# Patient Record
Sex: FEMALE | Race: WHITE | Employment: OTHER | URBAN - METROPOLITAN AREA
[De-identification: names, ages, dates, MRNs, and addresses within clinical notes are randomized per-mention and may not be internally consistent; named-entity substitution may affect disease eponyms.]

---

## 2017-08-02 ENCOUNTER — TELEPHONE (OUTPATIENT)
Dept: FAMILY MEDICINE CLINIC | Age: 67
End: 2017-08-02

## 2017-08-10 ENCOUNTER — OFFICE VISIT (OUTPATIENT)
Dept: FAMILY MEDICINE CLINIC | Age: 67
End: 2017-08-10

## 2017-08-10 VITALS
HEIGHT: 65 IN | DIASTOLIC BLOOD PRESSURE: 82 MMHG | SYSTOLIC BLOOD PRESSURE: 134 MMHG | HEART RATE: 82 BPM | OXYGEN SATURATION: 98 % | WEIGHT: 160 LBS | BODY MASS INDEX: 26.66 KG/M2

## 2017-08-10 DIAGNOSIS — Z00.00 MEDICARE WELCOME EXAM: Primary | ICD-10-CM

## 2017-08-10 DIAGNOSIS — Z78.0 MENOPAUSE: ICD-10-CM

## 2017-08-10 DIAGNOSIS — Z13.220 SCREENING, LIPID: ICD-10-CM

## 2017-08-10 DIAGNOSIS — Z23 NEED FOR DIPHTHERIA-TETANUS-PERTUSSIS (TDAP) VACCINE, ADULT/ADOLESCENT: ICD-10-CM

## 2017-08-10 DIAGNOSIS — R53.82 CHRONIC FATIGUE: ICD-10-CM

## 2017-08-10 DIAGNOSIS — Z23 NEED FOR PNEUMOCOCCAL VACCINE: ICD-10-CM

## 2017-08-10 PROCEDURE — 1036F TOBACCO NON-USER: CPT | Performed by: FAMILY MEDICINE

## 2017-08-10 PROCEDURE — G0009 ADMIN PNEUMOCOCCAL VACCINE: HCPCS | Performed by: FAMILY MEDICINE

## 2017-08-10 PROCEDURE — 4040F PNEUMOC VAC/ADMIN/RCVD: CPT | Performed by: FAMILY MEDICINE

## 2017-08-10 PROCEDURE — 99212 OFFICE O/P EST SF 10 MIN: CPT | Performed by: FAMILY MEDICINE

## 2017-08-10 PROCEDURE — G0402 INITIAL PREVENTIVE EXAM: HCPCS | Performed by: FAMILY MEDICINE

## 2017-08-10 PROCEDURE — G8419 CALC BMI OUT NRM PARAM NOF/U: HCPCS | Performed by: FAMILY MEDICINE

## 2017-08-10 PROCEDURE — G8427 DOCREV CUR MEDS BY ELIG CLIN: HCPCS | Performed by: FAMILY MEDICINE

## 2017-08-10 PROCEDURE — 1090F PRES/ABSN URINE INCON ASSESS: CPT | Performed by: FAMILY MEDICINE

## 2017-08-10 PROCEDURE — G8400 PT W/DXA NO RESULTS DOC: HCPCS | Performed by: FAMILY MEDICINE

## 2017-08-10 PROCEDURE — 1123F ACP DISCUSS/DSCN MKR DOCD: CPT | Performed by: FAMILY MEDICINE

## 2017-08-10 PROCEDURE — 3014F SCREEN MAMMO DOC REV: CPT | Performed by: FAMILY MEDICINE

## 2017-08-10 PROCEDURE — 90715 TDAP VACCINE 7 YRS/> IM: CPT | Performed by: FAMILY MEDICINE

## 2017-08-10 PROCEDURE — 90670 PCV13 VACCINE IM: CPT | Performed by: FAMILY MEDICINE

## 2017-08-10 PROCEDURE — 90471 IMMUNIZATION ADMIN: CPT | Performed by: FAMILY MEDICINE

## 2017-08-10 PROCEDURE — 3017F COLORECTAL CA SCREEN DOC REV: CPT | Performed by: FAMILY MEDICINE

## 2017-08-10 ASSESSMENT — LIFESTYLE VARIABLES: HOW OFTEN DO YOU HAVE A DRINK CONTAINING ALCOHOL: 0

## 2017-08-10 ASSESSMENT — PATIENT HEALTH QUESTIONNAIRE - PHQ9: SUM OF ALL RESPONSES TO PHQ QUESTIONS 1-9: 0

## 2017-08-10 ASSESSMENT — ANXIETY QUESTIONNAIRES: GAD7 TOTAL SCORE: 0

## 2017-08-17 ENCOUNTER — HOSPITAL ENCOUNTER (OUTPATIENT)
Dept: MAMMOGRAPHY | Age: 67
Discharge: HOME | End: 2017-08-17
Attending: FAMILY MEDICINE | Admitting: FAMILY MEDICINE

## 2017-08-17 ENCOUNTER — HOSPITAL ENCOUNTER (OUTPATIENT)
Dept: OTHER | Age: 67
Discharge: HOME | End: 2017-08-17
Attending: FAMILY MEDICINE | Admitting: FAMILY MEDICINE

## 2017-08-17 DIAGNOSIS — Z13.820 ENCOUNTER FOR IMAGING TO ASSESS OSTEOPOROSIS: ICD-10-CM

## 2017-08-17 DIAGNOSIS — Z78.0 ASYMPTOMATIC MENOPAUSAL STATE: ICD-10-CM

## 2017-08-17 LAB
A/G RATIO: 1.7 (ref 1.1–2.2)
ALBUMIN SERPL-MCNC: 4.5 G/DL (ref 3.4–5)
ALP BLD-CCNC: 84 U/L (ref 40–129)
ALT SERPL-CCNC: 29 U/L (ref 10–40)
ANION GAP SERPL CALCULATED.3IONS-SCNC: 17 MMOL/L (ref 3–16)
AST SERPL-CCNC: 26 U/L (ref 15–37)
BASOPHILS ABSOLUTE: 0 K/UL (ref 0–0.2)
BASOPHILS RELATIVE PERCENT: 0.4 %
BILIRUB SERPL-MCNC: 0.6 MG/DL (ref 0–1)
BUN BLDV-MCNC: 17 MG/DL (ref 7–20)
CALCIUM SERPL-MCNC: 9.7 MG/DL (ref 8.3–10.6)
CHLORIDE BLD-SCNC: 100 MMOL/L (ref 99–110)
CHOLESTEROL, FASTING: 203 MG/DL (ref 0–199)
CO2: 23 MMOL/L (ref 21–32)
CREAT SERPL-MCNC: 0.6 MG/DL (ref 0.6–1.2)
EOSINOPHILS ABSOLUTE: 0 K/UL (ref 0–0.6)
EOSINOPHILS RELATIVE PERCENT: 0.3 %
GFR AFRICAN AMERICAN: >60
GFR NON-AFRICAN AMERICAN: >60
GLOBULIN: 2.6 G/DL
GLUCOSE FASTING: 95 MG/DL (ref 70–99)
HCT VFR BLD CALC: 47.9 % (ref 36–48)
HDLC SERPL-MCNC: 57 MG/DL (ref 40–60)
HEMOGLOBIN: 16.1 G/DL (ref 12–16)
LDL CHOLESTEROL CALCULATED: 108 MG/DL
LYMPHOCYTES ABSOLUTE: 1.2 K/UL (ref 1–5.1)
LYMPHOCYTES RELATIVE PERCENT: 18 %
MCH RBC QN AUTO: 31.2 PG (ref 26–34)
MCHC RBC AUTO-ENTMCNC: 33.7 G/DL (ref 31–36)
MCV RBC AUTO: 92.5 FL (ref 80–100)
MONOCYTES ABSOLUTE: 0.2 K/UL (ref 0–1.3)
MONOCYTES RELATIVE PERCENT: 3.6 %
NEUTROPHILS ABSOLUTE: 5.3 K/UL (ref 1.7–7.7)
NEUTROPHILS RELATIVE PERCENT: 77.7 %
PDW BLD-RTO: 13.1 % (ref 12.4–15.4)
PLATELET # BLD: 151 K/UL (ref 135–450)
PMV BLD AUTO: 9.1 FL (ref 5–10.5)
POTASSIUM SERPL-SCNC: 4 MMOL/L (ref 3.5–5.1)
RBC # BLD: 5.17 M/UL (ref 4–5.2)
SODIUM BLD-SCNC: 140 MMOL/L (ref 136–145)
TOTAL PROTEIN: 7.1 G/DL (ref 6.4–8.2)
TRIGLYCERIDE, FASTING: 192 MG/DL (ref 0–150)
TSH SERPL DL<=0.05 MIU/L-ACNC: 1.98 UIU/ML (ref 0.27–4.2)
VLDLC SERPL CALC-MCNC: 38 MG/DL
WBC # BLD: 6.8 K/UL (ref 4–11)

## 2017-08-22 ENCOUNTER — OFFICE VISIT (OUTPATIENT)
Dept: FAMILY MEDICINE CLINIC | Age: 67
End: 2017-08-22

## 2017-08-22 VITALS
WEIGHT: 157 LBS | SYSTOLIC BLOOD PRESSURE: 122 MMHG | HEART RATE: 86 BPM | BODY MASS INDEX: 26.53 KG/M2 | DIASTOLIC BLOOD PRESSURE: 78 MMHG

## 2017-08-22 DIAGNOSIS — G89.29 CHRONIC MIDLINE LOW BACK PAIN WITH BILATERAL SCIATICA: Primary | ICD-10-CM

## 2017-08-22 DIAGNOSIS — G89.29 NECK PAIN, CHRONIC: ICD-10-CM

## 2017-08-22 DIAGNOSIS — M54.42 CHRONIC MIDLINE LOW BACK PAIN WITH BILATERAL SCIATICA: Primary | ICD-10-CM

## 2017-08-22 DIAGNOSIS — M25.552 PAIN OF BOTH HIP JOINTS: ICD-10-CM

## 2017-08-22 DIAGNOSIS — G89.29 CHRONIC MIDLINE LOW BACK PAIN WITH BILATERAL SCIATICA: ICD-10-CM

## 2017-08-22 DIAGNOSIS — M54.2 NECK PAIN, CHRONIC: ICD-10-CM

## 2017-08-22 DIAGNOSIS — M54.41 CHRONIC MIDLINE LOW BACK PAIN WITH BILATERAL SCIATICA: ICD-10-CM

## 2017-08-22 DIAGNOSIS — M25.551 PAIN OF BOTH HIP JOINTS: ICD-10-CM

## 2017-08-22 DIAGNOSIS — M54.42 CHRONIC MIDLINE LOW BACK PAIN WITH BILATERAL SCIATICA: ICD-10-CM

## 2017-08-22 DIAGNOSIS — M79.7 FIBROMYALGIA: ICD-10-CM

## 2017-08-22 DIAGNOSIS — M54.41 CHRONIC MIDLINE LOW BACK PAIN WITH BILATERAL SCIATICA: Primary | ICD-10-CM

## 2017-08-22 DIAGNOSIS — M16.10 ARTHRITIS PAIN OF HIP: Primary | ICD-10-CM

## 2017-08-22 LAB
BILIRUBIN, POC: NORMAL
BLOOD URINE, POC: NORMAL
CLARITY, POC: NORMAL
COLOR, POC: NORMAL
GLUCOSE URINE, POC: NORMAL
KETONES, POC: NORMAL
LEUKOCYTE EST, POC: NORMAL
NITRITE, POC: NORMAL
PH, POC: 5.5
PROTEIN, POC: NORMAL
SPECIFIC GRAVITY, POC: 1.02
UROBILINOGEN, POC: 0.2

## 2017-08-22 PROCEDURE — 3017F COLORECTAL CA SCREEN DOC REV: CPT | Performed by: FAMILY MEDICINE

## 2017-08-22 PROCEDURE — 81002 URINALYSIS NONAUTO W/O SCOPE: CPT | Performed by: FAMILY MEDICINE

## 2017-08-22 PROCEDURE — G8399 PT W/DXA RESULTS DOCUMENT: HCPCS | Performed by: FAMILY MEDICINE

## 2017-08-22 PROCEDURE — 1036F TOBACCO NON-USER: CPT | Performed by: FAMILY MEDICINE

## 2017-08-22 PROCEDURE — 3014F SCREEN MAMMO DOC REV: CPT | Performed by: FAMILY MEDICINE

## 2017-08-22 PROCEDURE — 99214 OFFICE O/P EST MOD 30 MIN: CPT | Performed by: FAMILY MEDICINE

## 2017-08-22 PROCEDURE — G8427 DOCREV CUR MEDS BY ELIG CLIN: HCPCS | Performed by: FAMILY MEDICINE

## 2017-08-22 PROCEDURE — 1123F ACP DISCUSS/DSCN MKR DOCD: CPT | Performed by: FAMILY MEDICINE

## 2017-08-22 PROCEDURE — 4040F PNEUMOC VAC/ADMIN/RCVD: CPT | Performed by: FAMILY MEDICINE

## 2017-08-22 PROCEDURE — 1090F PRES/ABSN URINE INCON ASSESS: CPT | Performed by: FAMILY MEDICINE

## 2017-08-22 PROCEDURE — G8419 CALC BMI OUT NRM PARAM NOF/U: HCPCS | Performed by: FAMILY MEDICINE

## 2017-08-24 ENCOUNTER — HOSPITAL ENCOUNTER (OUTPATIENT)
Dept: OTHER | Age: 67
Discharge: HOME | End: 2017-08-24
Attending: FAMILY MEDICINE | Admitting: FAMILY MEDICINE

## 2017-08-24 DIAGNOSIS — M25.551 RIGHT HIP PAIN: ICD-10-CM

## 2017-08-24 DIAGNOSIS — R52 PAIN: ICD-10-CM

## 2017-08-24 DIAGNOSIS — M16.0 BILATERAL HIP JOINT ARTHRITIS: Primary | ICD-10-CM

## 2017-08-30 ENCOUNTER — OFFICE VISIT (OUTPATIENT)
Dept: FAMILY MEDICINE CLINIC | Age: 67
End: 2017-08-30

## 2017-08-30 VITALS
BODY MASS INDEX: 27.04 KG/M2 | DIASTOLIC BLOOD PRESSURE: 70 MMHG | WEIGHT: 160 LBS | HEART RATE: 70 BPM | SYSTOLIC BLOOD PRESSURE: 150 MMHG

## 2017-08-30 DIAGNOSIS — M54.2 NECK PAIN, CHRONIC: ICD-10-CM

## 2017-08-30 DIAGNOSIS — M16.0 ARTHRITIS OF BOTH HIPS: Primary | ICD-10-CM

## 2017-08-30 DIAGNOSIS — R82.90 ABNORMAL URINE: ICD-10-CM

## 2017-08-30 DIAGNOSIS — G89.29 NECK PAIN, CHRONIC: ICD-10-CM

## 2017-08-30 PROCEDURE — 1036F TOBACCO NON-USER: CPT | Performed by: FAMILY MEDICINE

## 2017-08-30 PROCEDURE — 1123F ACP DISCUSS/DSCN MKR DOCD: CPT | Performed by: FAMILY MEDICINE

## 2017-08-30 PROCEDURE — G8399 PT W/DXA RESULTS DOCUMENT: HCPCS | Performed by: FAMILY MEDICINE

## 2017-08-30 PROCEDURE — 1090F PRES/ABSN URINE INCON ASSESS: CPT | Performed by: FAMILY MEDICINE

## 2017-08-30 PROCEDURE — G8427 DOCREV CUR MEDS BY ELIG CLIN: HCPCS | Performed by: FAMILY MEDICINE

## 2017-08-30 PROCEDURE — 3017F COLORECTAL CA SCREEN DOC REV: CPT | Performed by: FAMILY MEDICINE

## 2017-08-30 PROCEDURE — G8419 CALC BMI OUT NRM PARAM NOF/U: HCPCS | Performed by: FAMILY MEDICINE

## 2017-08-30 PROCEDURE — 3014F SCREEN MAMMO DOC REV: CPT | Performed by: FAMILY MEDICINE

## 2017-08-30 PROCEDURE — 99213 OFFICE O/P EST LOW 20 MIN: CPT | Performed by: FAMILY MEDICINE

## 2017-08-30 PROCEDURE — 4040F PNEUMOC VAC/ADMIN/RCVD: CPT | Performed by: FAMILY MEDICINE

## 2017-09-02 LAB — URINE CULTURE, ROUTINE: NORMAL

## 2017-09-18 ENCOUNTER — OFFICE VISIT (OUTPATIENT)
Dept: ORTHOPEDIC SURGERY | Age: 67
End: 2017-09-18

## 2017-09-18 VITALS
SYSTOLIC BLOOD PRESSURE: 131 MMHG | WEIGHT: 160.05 LBS | DIASTOLIC BLOOD PRESSURE: 88 MMHG | BODY MASS INDEX: 26.67 KG/M2 | HEIGHT: 65 IN | HEART RATE: 75 BPM

## 2017-09-18 DIAGNOSIS — M16.0 PRIMARY OSTEOARTHRITIS OF BOTH HIPS: Primary | ICD-10-CM

## 2017-09-18 PROCEDURE — 1090F PRES/ABSN URINE INCON ASSESS: CPT | Performed by: ORTHOPAEDIC SURGERY

## 2017-09-18 PROCEDURE — 1036F TOBACCO NON-USER: CPT | Performed by: ORTHOPAEDIC SURGERY

## 2017-09-18 PROCEDURE — 4040F PNEUMOC VAC/ADMIN/RCVD: CPT | Performed by: ORTHOPAEDIC SURGERY

## 2017-09-18 PROCEDURE — G8427 DOCREV CUR MEDS BY ELIG CLIN: HCPCS | Performed by: ORTHOPAEDIC SURGERY

## 2017-09-18 PROCEDURE — 3014F SCREEN MAMMO DOC REV: CPT | Performed by: ORTHOPAEDIC SURGERY

## 2017-09-18 PROCEDURE — 3017F COLORECTAL CA SCREEN DOC REV: CPT | Performed by: ORTHOPAEDIC SURGERY

## 2017-09-18 PROCEDURE — G8419 CALC BMI OUT NRM PARAM NOF/U: HCPCS | Performed by: ORTHOPAEDIC SURGERY

## 2017-09-18 PROCEDURE — 1123F ACP DISCUSS/DSCN MKR DOCD: CPT | Performed by: ORTHOPAEDIC SURGERY

## 2017-09-18 PROCEDURE — 99203 OFFICE O/P NEW LOW 30 MIN: CPT | Performed by: ORTHOPAEDIC SURGERY

## 2017-09-18 PROCEDURE — G8399 PT W/DXA RESULTS DOCUMENT: HCPCS | Performed by: ORTHOPAEDIC SURGERY

## 2017-10-05 ENCOUNTER — TELEPHONE (OUTPATIENT)
Dept: ORTHOPEDIC SURGERY | Age: 67
End: 2017-10-05

## 2018-07-19 ENCOUNTER — HOSPITAL ENCOUNTER (EMERGENCY)
Age: 68
Discharge: HOME | End: 2018-07-19
Attending: EMERGENCY MEDICINE
Payer: MEDICARE

## 2018-07-19 ENCOUNTER — APPOINTMENT (OUTPATIENT)
Dept: CT IMAGING | Age: 68
End: 2018-07-19
Payer: MEDICARE

## 2018-07-19 VITALS
SYSTOLIC BLOOD PRESSURE: 152 MMHG | RESPIRATION RATE: 16 BRPM | WEIGHT: 155 LBS | DIASTOLIC BLOOD PRESSURE: 70 MMHG | HEART RATE: 80 BPM | OXYGEN SATURATION: 96 % | BODY MASS INDEX: 25.79 KG/M2 | TEMPERATURE: 98.4 F

## 2018-07-19 DIAGNOSIS — N20.0 RENAL CALCULI: ICD-10-CM

## 2018-07-19 DIAGNOSIS — R10.9 BILATERAL FLANK PAIN: ICD-10-CM

## 2018-07-19 DIAGNOSIS — M54.50 BILATERAL LOW BACK PAIN WITHOUT SCIATICA, UNSPECIFIED CHRONICITY: Primary | ICD-10-CM

## 2018-07-19 DIAGNOSIS — R91.1 PULMONARY NODULE: ICD-10-CM

## 2018-07-19 LAB
A/G RATIO: 1.7 (ref 1.1–2.2)
ALBUMIN SERPL-MCNC: 4.4 G/DL (ref 3.4–5)
ALP BLD-CCNC: 85 U/L (ref 40–129)
ALT SERPL-CCNC: 19 U/L (ref 10–40)
AMORPHOUS: ABNORMAL /HPF
ANION GAP SERPL CALCULATED.3IONS-SCNC: 13 MMOL/L (ref 3–16)
AST SERPL-CCNC: 17 U/L (ref 15–37)
BACTERIA: ABNORMAL /HPF
BILIRUB SERPL-MCNC: 0.8 MG/DL (ref 0–1)
BILIRUBIN URINE: NEGATIVE
BLOOD, URINE: ABNORMAL
BUN BLDV-MCNC: 19 MG/DL (ref 7–20)
CALCIUM SERPL-MCNC: 9.6 MG/DL (ref 8.3–10.6)
CASTS 2: ABNORMAL /LPF
CASTS: ABNORMAL /LPF
CHLORIDE BLD-SCNC: 104 MMOL/L (ref 99–110)
CLARITY: CLEAR
CO2: 25 MMOL/L (ref 21–32)
COLOR: YELLOW
CREAT SERPL-MCNC: 0.7 MG/DL (ref 0.6–1.2)
EPITHELIAL CELLS, UA: ABNORMAL /HPF
GFR AFRICAN AMERICAN: >60
GFR NON-AFRICAN AMERICAN: >60
GLOBULIN: 2.6 G/DL
GLUCOSE BLD-MCNC: 103 MG/DL (ref 70–99)
GLUCOSE URINE: NEGATIVE MG/DL
HCT VFR BLD CALC: 48.8 % (ref 36–48)
HEMOGLOBIN: 16.8 G/DL (ref 12–16)
KETONES, URINE: NEGATIVE MG/DL
LEUKOCYTE ESTERASE, URINE: ABNORMAL
MCH RBC QN AUTO: 31.2 PG (ref 26–34)
MCHC RBC AUTO-ENTMCNC: 34.5 G/DL (ref 31–36)
MCV RBC AUTO: 90.6 FL (ref 80–100)
MICROSCOPIC EXAMINATION: YES
NITRITE, URINE: NEGATIVE
PDW BLD-RTO: 13.3 % (ref 12.4–15.4)
PH UA: 7
PLATELET # BLD: 173 K/UL (ref 135–450)
PMV BLD AUTO: 8.5 FL (ref 5–10.5)
POTASSIUM SERPL-SCNC: 4.1 MMOL/L (ref 3.5–5.1)
PROTEIN UA: NEGATIVE MG/DL
RBC # BLD: 5.39 M/UL (ref 4–5.2)
RBC UA: ABNORMAL /HPF (ref 0–2)
SODIUM BLD-SCNC: 142 MMOL/L (ref 136–145)
SPECIFIC GRAVITY UA: 1.02
TOTAL PROTEIN: 7 G/DL (ref 6.4–8.2)
URINE TYPE: ABNORMAL
UROBILINOGEN, URINE: 0.2 E.U./DL
WBC # BLD: 7.6 K/UL (ref 4–11)
WBC UA: ABNORMAL /HPF (ref 0–5)

## 2018-07-19 PROCEDURE — 80053 COMPREHEN METABOLIC PANEL: CPT

## 2018-07-19 PROCEDURE — 81001 URINALYSIS AUTO W/SCOPE: CPT

## 2018-07-19 PROCEDURE — 85027 COMPLETE CBC AUTOMATED: CPT

## 2018-07-19 PROCEDURE — 74176 CT ABD & PELVIS W/O CONTRAST: CPT

## 2018-07-19 PROCEDURE — 72131 CT LUMBAR SPINE W/O DYE: CPT

## 2018-07-19 PROCEDURE — 99283 EMERGENCY DEPT VISIT LOW MDM: CPT

## 2018-07-19 RX ORDER — TRAMADOL HYDROCHLORIDE 50 MG/1
50 TABLET ORAL EVERY 6 HOURS PRN
Qty: 8 TABLET | Refills: 0 | Status: SHIPPED | OUTPATIENT
Start: 2018-07-19 | End: 2018-07-21

## 2018-07-19 RX ORDER — KETOROLAC TROMETHAMINE 30 MG/ML
15 INJECTION, SOLUTION INTRAMUSCULAR; INTRAVENOUS ONCE
Status: DISCONTINUED | OUTPATIENT
Start: 2018-07-19 | End: 2018-07-19 | Stop reason: HOSPADM

## 2018-07-19 ASSESSMENT — ENCOUNTER SYMPTOMS
ABDOMINAL DISTENTION: 0
ABDOMINAL PAIN: 1
CONSTIPATION: 1
COUGH: 0
BACK PAIN: 1
SHORTNESS OF BREATH: 0
VOMITING: 0
WHEEZING: 0
EYES NEGATIVE: 1
BLOOD IN STOOL: 0
DIARRHEA: 0
ALLERGIC/IMMUNOLOGIC NEGATIVE: 1

## 2018-07-19 ASSESSMENT — PAIN DESCRIPTION - PAIN TYPE: TYPE: ACUTE PAIN

## 2018-07-19 ASSESSMENT — PAIN SCALES - GENERAL
PAINLEVEL_OUTOF10: 3
PAINLEVEL_OUTOF10: 8

## 2018-07-19 ASSESSMENT — PAIN DESCRIPTION - LOCATION: LOCATION: BACK

## 2018-07-19 NOTE — ED PROVIDER NOTES
weakness, light-headedness, numbness and headaches. Hematological: Negative. Psychiatric/Behavioral: Negative. Positives and Pertinent negatives as per HPI. Except as noted above in the ROS, all other systems were reviewed and negative. PAST MEDICAL HISTORY     Past Medical History:   Diagnosis Date    Elevated blood pressure reading in office with white coat syndrome, without diagnosis of hypertension          SURGICAL HISTORY       Past Surgical History:   Procedure Laterality Date    BREAST ENHANCEMENT SURGERY  1285    silicon injected    TUBAL LIGATION           CURRENT MEDICATIONS       Previous Medications    BISMUTH SUBSALICYLATE (PEPTO BISMOL) 262 MG/15ML SUSPENSION    Take 15 mLs by mouth every 6 hours as needed for Indigestion    DIPHENHYDRAMINE (BENADRYL) 25 MG CAPSULE    Take 25 mg by mouth 2 times daily    NAPROXEN SODIUM (ALEVE) 220 MG TABLET    Take 1 tablet by mouth as needed for Pain         ALLERGIES     Codeine and Morphine    FAMILY HISTORY     History reviewed. No pertinent family history. SOCIAL HISTORY       Social History     Social History    Marital status:      Spouse name: N/A    Number of children: 2    Years of education: N/A     Occupational History    insurance      sales, former     Social History Main Topics    Smoking status: Former Smoker    Smokeless tobacco: Never Used    Alcohol use Yes      Comment: occasional     Drug use: No    Sexual activity: Not Asked     Other Topics Concern    None     Social History Narrative    None       SCREENINGS             PHYSICAL EXAM    (up to 7 for level 4, 8 or more for level 5)     ED Triage Vitals [07/19/18 0829]   BP Temp Temp Source Pulse Resp SpO2 Height Weight   (!) 177/80 98.4 °F (36.9 °C) Oral 83 16 98 % -- 155 lb (70.3 kg)       Physical Exam   Constitutional: She is oriented to person, place, and time. She appears well-developed and well-nourished. No distress.    HENT:   Head: [id number]   CBC - Abnormal; Notable for the following:     RBC 5.39 (*)     Hemoglobin 16.8 (*)     Hematocrit 48.8 (*)     All other components within normal limits    Narrative:     Performed at:  [facility]) [address], [facility] [employer]   Phone [phone]   COMPREHENSIVE METABOLIC PANEL - Abnormal; Notable for the following:     Glucose 103 (*)     All other components within normal limits    Narrative:     Performed at:  [facility]) [address], [facility] [employer]   Phone [phone]       All other labs were within normal range or not returned as of this dictation. EKG: All EKG's are interpreted by the Emergency Department Physician who either signs or Co-signs this chart in the absence of a cardiologist.  Please see their note for interpretation of EKG. RADIOLOGY:   Non-plain film images such as CT, Ultrasound and MRI are read by the radiologist. Plain radiographic images are visualized and preliminarily interpreted by the  ED Provider with the below findings:        Interpretation per the Radiologist below, if available at the time of this note:    CT ABDOMEN PELVIS WO CONTRAST Additional Contrast? None   Final Result   There is a 2 mm nonobstructing left renal calculus. No hydronephrosis or   hydroureter. There is a 6 mm noncalcified subpleural nodule in the right middle lobe which   is seen on the very 1st image, incompletely included on this study. CT chest   now to evaluate true size or at minimum CT chest follow-up in 3 months   recommended. RECOMMENDATIONS:   Guidelines for follow-up and management of pulmonary nodules found on abdomen   CT:      <6 mm - No follow up recommend on the basis of the estimated low risk of   malignancy. 6-8-mm - recommend follow-up chest CT after an appropriate interval (3-12   months depending on clinical risk).       >8mm - immediate chest CT for further evaluation. Radiology 2017 http://pubs. rsna.org/doi/full/10.1148/radiol. [email or web address]         CT LUMBAR SPINE WO CONTRAST   Final Result   No significant change in appearance of the lumbar spine. No acute findings. No results found. PROCEDURES   Unless otherwise noted below, none     Procedures    CRITICAL CARE TIME   N/A    CONSULTS:  None      EMERGENCY DEPARTMENT COURSE and DIFFERENTIAL DIAGNOSIS/MDM:   Vitals:    Vitals:    07/19/18 0829   BP: (!) 177/80   Pulse: 83   Resp: 16   Temp: 98.4 °F (36.9 °C)   TempSrc: Oral   SpO2: 98%   Weight: 155 lb (70.3 kg)       Patient was given the following medications:  Medications   ketorolac (TORADOL) injection 15 mg (15 mg Intravenous Not Given 7/19/18 0931)       Patient appears well and in no acute distress. Skin is pwd, no cyanosis or pallor. Moist mucus membranes. Heart with RRR. Lungs are clear to auscultation. Abdomen is soft and non-distended. There is bilateral CVA tenderness on exam. Distal pulses are intact. Differentials: constipation, UTI, pyelonephritis, renal calculi  Labs: unremarkable  Urine: no signs of infection  CT lumbar: negative  CT abd:There is a 2 mm nonobstructing left renal calculus.  No hydronephrosis or  hydroureter. There is a 6 mm noncalcified subpleural nodule in the right middle lobe which  is seen on the very 1st image, incompletely included on this study.  CT chest  now to evaluate true size or at minimum CT chest follow-up in 3 months  recommended    IV toradol given for pain  Diagnosis: bilateral flank pain, low back pain  Patient will be discharged with Tramadol and instructed to follow up with PCP this week. The patient tolerated their visit well. They were seen and evaluated by the attending physician who agreed with the assessment and plan. The patient and / or the family were informed of the results of any tests, a time was given to answer questions, a plan was proposed and they agreed with plan. FINAL IMPRESSION      1. Bilateral low back pain without sciatica, unspecified chronicity    2. Bilateral flank pain    3. Renal calculi    4. Pulmonary nodule          DISPOSITION/PLAN   DISPOSITION Decision To Discharge 07/19/2018 10:39:48 AM      PATIENT REFERRED TO:  Ragini Panchal MD  [address] [facility]  [phone]    Schedule an appointment as soon as possible for a visit in 3 days  For recheck    [facility]  ED  [address] [facility] [address] [facility]  Go to   For new or worsening symptoms      DISCHARGE MEDICATIONS:  New Prescriptions    TRAMADOL (ULTRAM) 50 MG TABLET    Take 1 tablet by mouth every 6 hours as needed for Pain for up to 2 days. Esvin Moore        DISCONTINUED MEDICATIONS:  Discontinued Medications    No medications on file              (Please note that portions of this note were completed with a voice recognition program.  Efforts were made to edit the dictations but occasionally words are mis-transcribed.)    EMA Ocampo CNP (electronically signed)           EMA Ocampo CNP  07/19/18 1048

## 2018-07-19 NOTE — ED PROVIDER NOTES
I independently performed a history and physical on [address] [place]. All diagnostic, treatment, and disposition decisions were made by myself in conjunction with the advanced practice provider. For further details of [facility] emergency department encounter, please see Manju Gonzalez NP's note for full details of patient's visit. Patient presents to the department complaining of low back pain. Patient reports that she has developed bilateral low back pain/flank pain with radiation into her abdomen. No saddle anesthesia or urinary incontinence have been noted. Patient reports chronic constipation and has had ongoing difficulty with bowel movements. Physical exam: Gen.: No acute distress. Moderate discomfort. Abdomen[de-identified] Soft. Diffuse lower abdominal tenderness to palpation. No CVA tenderness. Normal bowel sounds. 1. Bilateral low back pain without sciatica, unspecified chronicity    2. Bilateral flank pain    3. Renal calculi    4.  Pulmonary nodule               Hector Primer, DO  07/19/18 9135

## 2018-07-21 ENCOUNTER — APPOINTMENT (OUTPATIENT)
Dept: GENERAL RADIOLOGY | Age: 68
End: 2018-07-21
Payer: MEDICARE

## 2018-07-21 ENCOUNTER — HOSPITAL ENCOUNTER (EMERGENCY)
Age: 68
Discharge: HOME | End: 2018-07-21
Payer: MEDICARE

## 2018-07-21 VITALS
OXYGEN SATURATION: 98 % | BODY MASS INDEX: 25.79 KG/M2 | WEIGHT: 155 LBS | SYSTOLIC BLOOD PRESSURE: 140 MMHG | DIASTOLIC BLOOD PRESSURE: 80 MMHG | HEART RATE: 80 BPM | TEMPERATURE: 97.5 F | RESPIRATION RATE: 18 BRPM

## 2018-07-21 DIAGNOSIS — R10.84 GENERALIZED ABDOMINAL PAIN: ICD-10-CM

## 2018-07-21 DIAGNOSIS — K59.00 CONSTIPATION, UNSPECIFIED CONSTIPATION TYPE: Primary | ICD-10-CM

## 2018-07-21 LAB
A/G RATIO: 1.7 (ref 1.1–2.2)
ALBUMIN SERPL-MCNC: 4.5 G/DL (ref 3.4–5)
ALP BLD-CCNC: 83 U/L (ref 40–129)
ALT SERPL-CCNC: 18 U/L (ref 10–40)
ANION GAP SERPL CALCULATED.3IONS-SCNC: 10 MMOL/L (ref 3–16)
AST SERPL-CCNC: 19 U/L (ref 15–37)
BASOPHILS ABSOLUTE: 0 K/UL (ref 0–0.2)
BASOPHILS RELATIVE PERCENT: 0.4 %
BILIRUB SERPL-MCNC: 0.6 MG/DL (ref 0–1)
BILIRUBIN URINE: NEGATIVE
BLOOD, URINE: ABNORMAL
BUN BLDV-MCNC: 20 MG/DL (ref 7–20)
CALCIUM SERPL-MCNC: 9.5 MG/DL (ref 8.3–10.6)
CHLORIDE BLD-SCNC: 101 MMOL/L (ref 99–110)
CLARITY: CLEAR
CO2: 27 MMOL/L (ref 21–32)
COLOR: YELLOW
CREAT SERPL-MCNC: 0.8 MG/DL (ref 0.6–1.2)
EOSINOPHILS ABSOLUTE: 0 K/UL (ref 0–0.6)
EOSINOPHILS RELATIVE PERCENT: 0.8 %
EPITHELIAL CELLS, UA: ABNORMAL /HPF
GFR AFRICAN AMERICAN: >60
GFR NON-AFRICAN AMERICAN: >60
GLOBULIN: 2.6 G/DL
GLUCOSE BLD-MCNC: 109 MG/DL (ref 70–99)
GLUCOSE URINE: NEGATIVE MG/DL
HCT VFR BLD CALC: 47.1 % (ref 36–48)
HEMOGLOBIN: 16.2 G/DL (ref 12–16)
KETONES, URINE: NEGATIVE MG/DL
LEUKOCYTE ESTERASE, URINE: ABNORMAL
LIPASE: 38 U/L (ref 13–60)
LYMPHOCYTES ABSOLUTE: 1 K/UL (ref 1–5.1)
LYMPHOCYTES RELATIVE PERCENT: 18.1 %
MCH RBC QN AUTO: 31.3 PG (ref 26–34)
MCHC RBC AUTO-ENTMCNC: 34.3 G/DL (ref 31–36)
MCV RBC AUTO: 91.3 FL (ref 80–100)
MICROSCOPIC EXAMINATION: YES
MONOCYTES ABSOLUTE: 0.3 K/UL (ref 0–1.3)
MONOCYTES RELATIVE PERCENT: 5 %
NEUTROPHILS ABSOLUTE: 4.4 K/UL (ref 1.7–7.7)
NEUTROPHILS RELATIVE PERCENT: 75.7 %
NITRITE, URINE: NEGATIVE
PDW BLD-RTO: 13 % (ref 12.4–15.4)
PH UA: 6.5
PLATELET # BLD: 159 K/UL (ref 135–450)
PMV BLD AUTO: 8.5 FL (ref 5–10.5)
POTASSIUM REFLEX MAGNESIUM: 3.6 MMOL/L (ref 3.5–5.1)
PROTEIN UA: NEGATIVE MG/DL
RBC # BLD: 5.16 M/UL (ref 4–5.2)
RBC UA: ABNORMAL /HPF (ref 0–2)
SODIUM BLD-SCNC: 138 MMOL/L (ref 136–145)
SPECIFIC GRAVITY UA: 1.01
TOTAL PROTEIN: 7.1 G/DL (ref 6.4–8.2)
URINE REFLEX TO CULTURE: YES
URINE TYPE: ABNORMAL
UROBILINOGEN, URINE: 0.2 E.U./DL
WBC # BLD: 5.8 K/UL (ref 4–11)
WBC UA: ABNORMAL /HPF (ref 0–5)

## 2018-07-21 PROCEDURE — 83690 ASSAY OF LIPASE: CPT

## 2018-07-21 PROCEDURE — 81001 URINALYSIS AUTO W/SCOPE: CPT

## 2018-07-21 PROCEDURE — 99283 EMERGENCY DEPT VISIT LOW MDM: CPT

## 2018-07-21 PROCEDURE — 87086 URINE CULTURE/COLONY COUNT: CPT

## 2018-07-21 PROCEDURE — 74019 RADEX ABDOMEN 2 VIEWS: CPT

## 2018-07-21 PROCEDURE — 85025 COMPLETE CBC W/AUTO DIFF WBC: CPT

## 2018-07-21 PROCEDURE — 80053 COMPREHEN METABOLIC PANEL: CPT

## 2018-07-21 RX ORDER — MAGNESIUM CITRATE
150 SOLUTION, ORAL ORAL ONCE
Qty: 1 BOTTLE | Refills: 0 | Status: SHIPPED | OUTPATIENT
Start: 2018-07-21 | End: 2018-07-21

## 2018-07-23 LAB — URINE CULTURE, ROUTINE: NORMAL

## 2018-08-06 ENCOUNTER — OFFICE VISIT (OUTPATIENT)
Dept: FAMILY MEDICINE CLINIC | Age: 68
End: 2018-08-06

## 2018-08-06 VITALS
SYSTOLIC BLOOD PRESSURE: 116 MMHG | HEIGHT: 65 IN | BODY MASS INDEX: 24.83 KG/M2 | HEART RATE: 84 BPM | WEIGHT: 149 LBS | OXYGEN SATURATION: 98 % | DIASTOLIC BLOOD PRESSURE: 80 MMHG

## 2018-08-06 DIAGNOSIS — M54.2 NECK PAIN: ICD-10-CM

## 2018-08-06 DIAGNOSIS — M85.80 OTHER SPECIFIED DISORDERS OF BONE DENSITY AND STRUCTURE, UNSPECIFIED SITE: ICD-10-CM

## 2018-08-06 DIAGNOSIS — R91.1 LUNG NODULE SEEN ON IMAGING STUDY: ICD-10-CM

## 2018-08-06 DIAGNOSIS — M85.89 OSTEOPENIA OF MULTIPLE SITES: ICD-10-CM

## 2018-08-06 DIAGNOSIS — E78.2 MIXED HYPERLIPIDEMIA: ICD-10-CM

## 2018-08-06 DIAGNOSIS — Z00.00 ENCOUNTER FOR MEDICAL EXAMINATION TO ESTABLISH CARE: Primary | ICD-10-CM

## 2018-08-06 DIAGNOSIS — Z12.11 SCREENING FOR COLON CANCER: ICD-10-CM

## 2018-08-06 DIAGNOSIS — M16.0 PRIMARY OSTEOARTHRITIS OF BOTH HIPS: ICD-10-CM

## 2018-08-06 DIAGNOSIS — R03.0 WHITE COAT SYNDROME WITHOUT DIAGNOSIS OF HYPERTENSION: ICD-10-CM

## 2018-08-06 DIAGNOSIS — Z00.00 HEALTHCARE MAINTENANCE: ICD-10-CM

## 2018-08-06 DIAGNOSIS — Z80.3 FAMILY HISTORY OF BREAST CANCER IN MOTHER: ICD-10-CM

## 2018-08-06 PROCEDURE — 1123F ACP DISCUSS/DSCN MKR DOCD: CPT | Performed by: FAMILY MEDICINE

## 2018-08-06 PROCEDURE — 1090F PRES/ABSN URINE INCON ASSESS: CPT | Performed by: FAMILY MEDICINE

## 2018-08-06 PROCEDURE — 1036F TOBACCO NON-USER: CPT | Performed by: FAMILY MEDICINE

## 2018-08-06 PROCEDURE — 1101F PT FALLS ASSESS-DOCD LE1/YR: CPT | Performed by: FAMILY MEDICINE

## 2018-08-06 PROCEDURE — G0438 PPPS, INITIAL VISIT: HCPCS | Performed by: FAMILY MEDICINE

## 2018-08-06 PROCEDURE — 3017F COLORECTAL CA SCREEN DOC REV: CPT | Performed by: FAMILY MEDICINE

## 2018-08-06 PROCEDURE — G8427 DOCREV CUR MEDS BY ELIG CLIN: HCPCS | Performed by: FAMILY MEDICINE

## 2018-08-06 PROCEDURE — G8420 CALC BMI NORM PARAMETERS: HCPCS | Performed by: FAMILY MEDICINE

## 2018-08-06 PROCEDURE — 4040F PNEUMOC VAC/ADMIN/RCVD: CPT | Performed by: FAMILY MEDICINE

## 2018-08-06 PROCEDURE — G8399 PT W/DXA RESULTS DOCUMENT: HCPCS | Performed by: FAMILY MEDICINE

## 2018-08-06 ASSESSMENT — ENCOUNTER SYMPTOMS
COUGH: 0
VOMITING: 0
STRIDOR: 0
TROUBLE SWALLOWING: 0
WHEEZING: 0
PHOTOPHOBIA: 0
SHORTNESS OF BREATH: 0
RHINORRHEA: 0
CHEST TIGHTNESS: 0
ABDOMINAL PAIN: 0
SORE THROAT: 0
NAUSEA: 0
DIARRHEA: 0
COLOR CHANGE: 0
BLOOD IN STOOL: 0
BACK PAIN: 1
CONSTIPATION: 1
APNEA: 0
CHOKING: 0

## 2018-08-06 NOTE — PATIENT INSTRUCTIONS
antioxidants. New research shows that fasting helps lower risk of breast cancer, diabetes, inflammation, cardiovascular disease, Alzheimers, and increases longevity. We don't have a lot of information on how much to fast, but even overnight fasts of 13 hours makes a difference. Consider skipping snacks after dinner. Exercise 1 hour a day at least 5 days a week. If you do not currently exercise, start slow by maybe walking 5 minutes out, 5 minutes back. Increase the amount of time you exercise every day by 2 - 5 minutes, as tolerated. Your goal should be to get to 1/2 - 1 hour a day. Exercise will help you control metabolic diseases, maintain independence, and reduce your risk for dementia. Weight training and resistance exercises have been shown to help preserve muscle mass and strength. It is recommended that these be done twice a week. Balance is important to prevent falls. An easy way to improve this is to stand on one leg at a time while you brush your teeth. Gently stretch your joints to maintain flexibilty. And maintain good posture to protect your spine. Patient Education        Back Stretches: Exercises  Your Care Instructions  Here are some examples of exercises for stretching your back. Start each exercise slowly. Ease off the exercise if you start to have pain. Your doctor or physical therapist will tell you when you can start these exercises and which ones will work best for you. How to do the exercises  Overhead stretch    1. Stand comfortably with your feet shoulder-width apart. 2. Looking straight ahead, raise both arms over your head and reach toward the ceiling. Do not allow your head to tilt back. 3. Hold for 15 to 30 seconds, then lower your arms to your sides. 4. Repeat 2 to 4 times. Side stretch    1. Stand comfortably with your feet shoulder-width apart. 2. Raise one arm over your head, and then lean to the other side.   3. Slide your hand down your leg as you let the weight of your arm gently stretch your side muscles. Hold for 15 to 30 seconds. 4. Repeat 2 to 4 times on each side. Press-up    1. Lie on your stomach, supporting your body with your forearms. 2. Press your elbows down into the floor to raise your upper back. As you do this, relax your stomach muscles and allow your back to arch without using your back muscles. As your press up, do not let your hips or pelvis come off the floor. 3. Hold for 15 to 30 seconds, then relax. 4. Repeat 2 to 4 times. Relax and rest    1. Lie on your back with a rolled towel under your neck and a pillow under your knees. Extend your arms comfortably to your sides. 2. Relax and breathe normally. 3. Remain in this position for about 10 minutes. 4. If you can, do this 2 or 3 times each day. Follow-up care is a key part of your treatment and safety. Be sure to make and go to all appointments, and call your doctor if you are having problems. It's also a good idea to know your test results and keep a list of the medicines you take. Where can you learn more? Go to https://[employer]. org and sign in to your [employer] account. Enter B531 in the [employer] box to learn more about \"Back Stretches: Exercises. \"     If you do not have an account, please click on the \"Sign Up Now\" link. Current as of: November 29, 2017  Content Version: 11.6  © 0009-5144 [employer]. Care instructions adapted under license by [facility] ([facility]). If you have questions about a medical condition or this instruction, always ask your healthcare professional. [place] [address] any warranty or liability for your use of this information.
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
## 2018-08-14 ENCOUNTER — TELEPHONE (OUTPATIENT)
Dept: FAMILY MEDICINE CLINIC | Age: 68
End: 2018-08-14

## 2018-08-20 DIAGNOSIS — E78.2 MIXED HYPERLIPIDEMIA: ICD-10-CM

## 2018-08-20 DIAGNOSIS — M85.80 OTHER SPECIFIED DISORDERS OF BONE DENSITY AND STRUCTURE, UNSPECIFIED SITE: ICD-10-CM

## 2018-08-20 DIAGNOSIS — Z00.00 HEALTHCARE MAINTENANCE: ICD-10-CM

## 2018-08-20 LAB
CHOLESTEROL, TOTAL: 199 MG/DL (ref 0–199)
HDLC SERPL-MCNC: 60 MG/DL (ref 40–60)
HEPATITIS C ANTIBODY INTERPRETATION: NORMAL
LDL CHOLESTEROL CALCULATED: 112 MG/DL
TRIGL SERPL-MCNC: 134 MG/DL (ref 0–150)
TSH REFLEX: 2.81 UIU/ML (ref 0.27–4.2)
VITAMIN D 25-HYDROXY: 24.6 NG/ML
VLDLC SERPL CALC-MCNC: 27 MG/DL

## 2018-08-21 LAB
ESTIMATED AVERAGE GLUCOSE: 105.4 MG/DL
HBA1C MFR BLD: 5.3 %

## 2018-09-05 PROBLEM — Z12.11 SCREENING FOR COLON CANCER: Status: RESOLVED | Noted: 2018-08-06 | Resolved: 2018-09-05

## 2018-09-20 ENCOUNTER — OFFICE VISIT (OUTPATIENT)
Dept: FAMILY MEDICINE CLINIC | Age: 68
End: 2018-09-20

## 2018-09-20 VITALS
HEART RATE: 79 BPM | SYSTOLIC BLOOD PRESSURE: 140 MMHG | TEMPERATURE: 97.7 F | OXYGEN SATURATION: 98 % | DIASTOLIC BLOOD PRESSURE: 70 MMHG | WEIGHT: 143 LBS | BODY MASS INDEX: 23.8 KG/M2

## 2018-09-20 DIAGNOSIS — H61.21 IMPACTED CERUMEN OF RIGHT EAR: Primary | ICD-10-CM

## 2018-09-20 PROCEDURE — 1123F ACP DISCUSS/DSCN MKR DOCD: CPT | Performed by: PHYSICIAN ASSISTANT

## 2018-09-20 PROCEDURE — G8399 PT W/DXA RESULTS DOCUMENT: HCPCS | Performed by: PHYSICIAN ASSISTANT

## 2018-09-20 PROCEDURE — 4040F PNEUMOC VAC/ADMIN/RCVD: CPT | Performed by: PHYSICIAN ASSISTANT

## 2018-09-20 PROCEDURE — 1036F TOBACCO NON-USER: CPT | Performed by: PHYSICIAN ASSISTANT

## 2018-09-20 PROCEDURE — 1090F PRES/ABSN URINE INCON ASSESS: CPT | Performed by: PHYSICIAN ASSISTANT

## 2018-09-20 PROCEDURE — 1101F PT FALLS ASSESS-DOCD LE1/YR: CPT | Performed by: PHYSICIAN ASSISTANT

## 2018-09-20 PROCEDURE — G8427 DOCREV CUR MEDS BY ELIG CLIN: HCPCS | Performed by: PHYSICIAN ASSISTANT

## 2018-09-20 PROCEDURE — G8420 CALC BMI NORM PARAMETERS: HCPCS | Performed by: PHYSICIAN ASSISTANT

## 2018-09-20 PROCEDURE — 99213 OFFICE O/P EST LOW 20 MIN: CPT | Performed by: PHYSICIAN ASSISTANT

## 2018-09-20 PROCEDURE — 3017F COLORECTAL CA SCREEN DOC REV: CPT | Performed by: PHYSICIAN ASSISTANT

## 2018-09-20 ASSESSMENT — ENCOUNTER SYMPTOMS
EYE DISCHARGE: 0
SORE THROAT: 0
COUGH: 0
WHEEZING: 0
EYE ITCHING: 0
SHORTNESS OF BREATH: 0
SINUS PAIN: 0

## 2018-09-20 ASSESSMENT — PATIENT HEALTH QUESTIONNAIRE - PHQ9
SUM OF ALL RESPONSES TO PHQ QUESTIONS 1-9: 0
SUM OF ALL RESPONSES TO PHQ9 QUESTIONS 1 & 2: 0
2. FEELING DOWN, DEPRESSED OR HOPELESS: 0
SUM OF ALL RESPONSES TO PHQ QUESTIONS 1-9: 0
1. LITTLE INTEREST OR PLEASURE IN DOING THINGS: 0

## 2018-10-12 ENCOUNTER — TELEPHONE (OUTPATIENT)
Dept: FAMILY MEDICINE CLINIC | Age: 68
End: 2018-10-12

## 2018-10-12 ENCOUNTER — OFFICE VISIT (OUTPATIENT)
Dept: FAMILY MEDICINE CLINIC | Age: 68
End: 2018-10-12
Payer: MEDICARE

## 2018-10-12 VITALS
OXYGEN SATURATION: 98 % | BODY MASS INDEX: 24.52 KG/M2 | HEIGHT: 64 IN | SYSTOLIC BLOOD PRESSURE: 128 MMHG | TEMPERATURE: 98.1 F | WEIGHT: 143.6 LBS | HEART RATE: 78 BPM | DIASTOLIC BLOOD PRESSURE: 84 MMHG

## 2018-10-12 DIAGNOSIS — M51.36 LUMBAR DEGENERATIVE DISC DISEASE: Primary | ICD-10-CM

## 2018-10-12 DIAGNOSIS — Z00.00 ROUTINE GENERAL MEDICAL EXAMINATION AT A HEALTH CARE FACILITY: ICD-10-CM

## 2018-10-12 PROCEDURE — 4040F PNEUMOC VAC/ADMIN/RCVD: CPT | Performed by: FAMILY MEDICINE

## 2018-10-12 PROCEDURE — G0444 DEPRESSION SCREEN ANNUAL: HCPCS | Performed by: FAMILY MEDICINE

## 2018-10-12 PROCEDURE — G8484 FLU IMMUNIZE NO ADMIN: HCPCS | Performed by: FAMILY MEDICINE

## 2018-10-12 PROCEDURE — G0439 PPPS, SUBSEQ VISIT: HCPCS | Performed by: FAMILY MEDICINE

## 2018-10-12 ASSESSMENT — PATIENT HEALTH QUESTIONNAIRE - PHQ9: SUM OF ALL RESPONSES TO PHQ QUESTIONS 1-9: 12

## 2018-10-12 ASSESSMENT — ANXIETY QUESTIONNAIRES: GAD7 TOTAL SCORE: 3

## 2018-10-12 ASSESSMENT — LIFESTYLE VARIABLES: HOW OFTEN DO YOU HAVE A DRINK CONTAINING ALCOHOL: 0

## 2018-10-12 NOTE — PATIENT INSTRUCTIONS
any diet is to burn up more calories than you eat. How quickly you lose weight depends upon several factors, such as your age, gender, and starting weight. Older people have a slower metabolism than young people, so they lose weight more slowly. Men lose more weight than women of similar height and weight when dieting because they use more energy. People who are extremely overweight lose weight more quickly than those who are only mildly overweight. Try not to drink alcohol or drinks with added sugar, and most sweets (candy, cakes, cookies), since they rarely contain important nutrients. Portion-controlled diets -- One simple way to diet is to buy packaged foods, like frozen low-calorie meals or meal-replacement canned drinks. A typical meal plan for one day may include:  A meal-replacement drink or breakfast bar for breakfast   A meal-replacement drink or a frozen low-calorie (250 to 350 calories) meal for lunch   A frozen low-calorie meal or other prepackaged, calorie-controlled meal, along with extra vegetables for dinner  This would give you 1000 to 1500 calories per day. Low-fat diet -- To reduce the amount of fat in your diet, you can:  Eat low-fat foods. Low-fat foods are those that contain less than 30 percent of calories from fat. Fat is listed on the food facts label (figure 1). Count fat grams. For a 1500 calorie diet, this would mean about 45 g or fewer of fat per day. Low-carbohydrate diet -- Low- and very-low-carbohydrate diets (eg, Atkins diet, [employer]) have become popular ways to lose weight quickly.   With a very-low-carbohydrate diet, you eat between 0 and 60 grams of carbohydrates per day (a standard diet contains 200 to 300 grams of carbohydrates)   With a low-carbohydrate diet, you eat between 60 and 130 grams of carbohydrates per day  Carbohydrates are found in fruits, vegetables, and grains (including breads, rice, pasta, and cereal), alcoholic beverages, and in dairy \"after\" photos or testimonials. Diets that sound too good to be true usually are. These plans are a waste of time and money and are not recommended. A doctor, nurse, or nutritionist can help you find a safe and effective way to lose weight and keep it off. WEIGHT LOSS MEDICINES -- Taking a weight loss medicine may be helpful when used in combination with diet, exercise, and lifestyle changes. However, it is important to understand the risks and benefits of these medicines. It is also important to be realistic about your goal weight using a weight loss medicine; you may not reach your \"dream\" weight, but you may be able to reduce your risk of diabetes or heart disease. Weight loss medicines may be recommended for people who have not been able to lose weight with diet and exercise who have a:  BMI of 30 or more    BMI between 27 and 29.9 and have other medical problems, such as diabetes, high cholesterol, or high blood pressure  Two weight loss medicines are approved in the [place] for long-term use. These are sibutramine and orlistat. Other weight loss medicines (phentermine, diethylpropion) are available but are only approved for short-term use (up to 12 weeks). Sibutramine -- Sibutramine (Meridia®, Reductil®) is a medicine that reduces your appetite. In people who take the medicine for one year, the average weight loss is 10 percent of the initial body weight (5 percent more than those who took a placebo treatment). Side effects of sibutramine include insomnia, dry mouth, and constipation. Increases in blood pressure can occur. Therefore, blood pressure is usually monitored during treatment. There is no evidence that sibutramine causes heart or lung problems (like dexfenfluramine and fenfluramine (Phen/Fen)).  However, experts agree that sibutramine should not used by people with coronary heart disease, heart failure, uncontrolled hypertension, stroke, irregular heart rhythms, or peripheral vascular disease (poor circulation in the legs). Orlistat -- Orlistat (Xenical® 120 mg capsules) is a medicine that reduces the amount of fat your body absorbs from the foods you eat. A lower-dose version is now available without a prescription (Eliezer® 60 mg capsules) in many countries, including the [place]. The medicine is recommended three times per day, taken with a meal; you can skip a dose if you skip a meal or if the meal contains no fat. After one year of treatment with orlistat, the average weight loss is approximately 8 to 10 percent of initial body weight (4 percent more than in those who took a placebo). Cholesterol levels often improve, and blood pressure sometimes falls. In people with diabetes, orlistat may help control blood sugar levels. Side effects occur in 15 to 10 percent of people and may include stomach cramps, gas, diarrhea, leakage of stool, or oily stools. These problems are more likely when you take orlistat with a high-fat meal (if more than 30 percent of calories in the meal are from fat). Side effects usually improve as you learn to avoid high-fat foods. Severe liver injury has been reported rarely in patients taking orlistat, but it is not known if orlistat caused the liver problems. Diet supplements -- Diet supplements are widely used by people who are trying to lose weight, although the safety and efficacy of these supplements are often unproven. A few of the more common diet supplements are discussed below; none of these are recommended because they have not been studied carefully, and there is no proof they are safe or effective. Chitosan and wheat dextrin are ineffective for weight loss, and their use is not recommended. Ephedra, a compound related to ephedrine, is no longer available in the [place] due to safety concerns. Many nonprescription diet pills previously contained ephedra.  Although some studies have shown that ephedra helps with weight loss, there can be makes it into a narrow tube (figure 3). The new stomach is much smaller and produces less of the hormone (ghrelin) that causes hunger, helping you feel satisfied with less food. Sleeve gastrectomy is safer than gastric bypass because the intestines are not rearranged, and there is less chance of malnutrition. It also appears to control hunger better than lap banding. It might be safer than the lap banding because no foreign materials are used. The gastric sleeve has a good success rate, and people lose an average of 33 percent of their excess body weight in the first year. For a person who is 120 pounds overweight, this would mean losing about 40 pounds in the first year. WEIGHT LOSS SURGERY COMPLICATIONS -- A variety of complications can occur with weight loss surgery. The risks of surgery depend upon which surgery you have and any medical problems you had before surgery. Some of the more common early surgical complications (one to six weeks after surgery) include:  Bleeding   Infection   Blockage or tear in the bowels   Need for further surgery  Important medical complications after surgery can include blood clots in the legs or lungs, heart attack, pneumonia, and urinary tract infection. Complications are less likely when surgery is performed in centers that are experienced in weight loss surgery. In general, centers with experience in weight loss surgery have:  Board-certified doctors and surgeons   A team of support staff (dietitians, counselors, nurses)   Long-term follow-up after surgery   Hospital staff experienced with the care of weight loss patients. This includes nurses who are trained in the care of patients immediately after surgery and anesthesiologists who are experienced in caring for the morbidly obese. EFFECTIVENESS OF WEIGHT LOSS SURGERY -- The goal of weight loss surgery is to reduce the risk of illness or death associated with obesity.  Weight loss surgery can also help you to feel and area.  Before you decide to have weight loss surgery, you must commit to staying healthy for life. This includes following up with your healthcare team, exercising most days of the week, and eating a sensible diet every day. It can be difficult to develop new eating and exercise habits after weight loss surgery, and you will have to work hard to stick to your goals. Recovering from surgery and losing weight can be stressful and emotional, and it is important to have the support of family and friends. Working with a , therapist, or support group can help you through the ups and downs. WHERE TO GET MORE INFORMATION -- Your healthcare provider is the best source of information for questions and concerns related to your medical problem. This article will be updated as needed every four months on our Web site ([email or web address])  ·     Keep Your Memory Gwinda Listen       Many factors can affect your ability to remembera hectic lifestyle, aging, stress, chronic disease, and certain medicines. But, there are steps you can take to sharpen your mind and help preserve your memory. Challenge Your Brain   Regularly challenging your mind may help keeps it in top shape. Good mental exercises include:   Crossword puzzlesUse a dictionary if you need it; you will learn more that way. Brainteasers Try some! Crafts, such as wood working and sewing   Hobbies, such as gardening and building model airplanes   SocializingVisit old friends or join groups to meet new ones.    Reading   Learning a new language   Taking a class, whether it be art history or adithya chi   TravelingExperience the food, history, and culture of your destination   Learning to use a computer   Going to museums, the theater, or thought-provoking movies   Changing things in your daily life, such as reversing your pattern in the grocery store or brushing your teeth using your nondominant hand   Use Memory Aids   There is no need to remember every

## 2018-12-08 ENCOUNTER — APPOINTMENT (OUTPATIENT)
Dept: CT IMAGING | Age: 68
End: 2018-12-08
Payer: MEDICARE

## 2018-12-08 ENCOUNTER — HOSPITAL ENCOUNTER (EMERGENCY)
Age: 68
Discharge: HOME | End: 2018-12-08
Attending: EMERGENCY MEDICINE
Payer: MEDICARE

## 2018-12-08 VITALS
RESPIRATION RATE: 16 BRPM | HEIGHT: 65 IN | BODY MASS INDEX: 23.32 KG/M2 | TEMPERATURE: 98.2 F | SYSTOLIC BLOOD PRESSURE: 133 MMHG | OXYGEN SATURATION: 98 % | HEART RATE: 76 BPM | DIASTOLIC BLOOD PRESSURE: 69 MMHG | WEIGHT: 140 LBS

## 2018-12-08 DIAGNOSIS — M25.552 ACUTE PAIN OF LEFT HIP: Primary | ICD-10-CM
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
## 2018-12-08 DIAGNOSIS — R07.9 CHEST PAIN, UNSPECIFIED TYPE: ICD-10-CM

## 2018-12-08 DIAGNOSIS — S16.1XXA STRAIN OF NECK MUSCLE, INITIAL ENCOUNTER: ICD-10-CM

## 2018-12-08 DIAGNOSIS — M54.50 ACUTE LEFT-SIDED LOW BACK PAIN WITHOUT SCIATICA: ICD-10-CM

## 2018-12-08 LAB
A/G RATIO: 1.6 (ref 1.1–2.2)
ALBUMIN SERPL-MCNC: 4.4 G/DL (ref 3.4–5)
ALP BLD-CCNC: 105 U/L (ref 40–129)
ALT SERPL-CCNC: 27 U/L (ref 10–40)
ANION GAP SERPL CALCULATED.3IONS-SCNC: 17 MMOL/L (ref 3–16)
AST SERPL-CCNC: 22 U/L (ref 15–37)
BASOPHILS ABSOLUTE: 0 K/UL (ref 0–0.2)
BASOPHILS RELATIVE PERCENT: 0.3 %
BILIRUB SERPL-MCNC: 0.5 MG/DL (ref 0–1)
BUN BLDV-MCNC: 18 MG/DL (ref 7–20)
CALCIUM SERPL-MCNC: 9.5 MG/DL (ref 8.3–10.6)
CHLORIDE BLD-SCNC: 102 MMOL/L (ref 99–110)
CO2: 23 MMOL/L (ref 21–32)
CREAT SERPL-MCNC: 0.6 MG/DL (ref 0.6–1.2)
EKG ATRIAL RATE: 85 BPM
EKG DIAGNOSIS: NORMAL
EKG P AXIS: 67 DEGREES
EKG P-R INTERVAL: 158 MS
EKG Q-T INTERVAL: 400 MS
EKG QRS DURATION: 76 MS
EKG QTC CALCULATION (BAZETT): 476 MS
EKG R AXIS: 67 DEGREES
EKG T AXIS: 60 DEGREES
EKG VENTRICULAR RATE: 85 BPM
EOSINOPHILS ABSOLUTE: 0 K/UL (ref 0–0.6)
EOSINOPHILS RELATIVE PERCENT: 0.2 %
GFR AFRICAN AMERICAN: >60
GFR NON-AFRICAN AMERICAN: >60
GLOBULIN: 2.7 G/DL
GLUCOSE BLD-MCNC: 116 MG/DL (ref 70–99)
HCT VFR BLD CALC: 48.3 % (ref 36–48)
HEMOGLOBIN: 16.1 G/DL (ref 12–16)
LYMPHOCYTES ABSOLUTE: 0.9 K/UL (ref 1–5.1)
LYMPHOCYTES RELATIVE PERCENT: 15.1 %
MCH RBC QN AUTO: 30.6 PG (ref 26–34)
MCHC RBC AUTO-ENTMCNC: 33.3 G/DL (ref 31–36)
MCV RBC AUTO: 91.8 FL (ref 80–100)
MONOCYTES ABSOLUTE: 0.2 K/UL (ref 0–1.3)
MONOCYTES RELATIVE PERCENT: 3.1 %
NEUTROPHILS ABSOLUTE: 5.1 K/UL (ref 1.7–7.7)
NEUTROPHILS RELATIVE PERCENT: 81.3 %
PDW BLD-RTO: 13.3 % (ref 12.4–15.4)
PLATELET # BLD: 175 K/UL (ref 135–450)
PMV BLD AUTO: 8.4 FL (ref 5–10.5)
POTASSIUM SERPL-SCNC: 3.3 MMOL/L (ref 3.5–5.1)
RBC # BLD: 5.26 M/UL (ref 4–5.2)
SODIUM BLD-SCNC: 142 MMOL/L (ref 136–145)
TOTAL PROTEIN: 7.1 G/DL (ref 6.4–8.2)
TROPONIN: <0.01 NG/ML
WBC # BLD: 6.2 K/UL (ref 4–11)

## 2018-12-08 PROCEDURE — 93010 ELECTROCARDIOGRAM REPORT: CPT | Performed by: INTERNAL MEDICINE

## 2018-12-08 PROCEDURE — 70450 CT HEAD/BRAIN W/O DYE: CPT

## 2018-12-08 PROCEDURE — 84484 ASSAY OF TROPONIN QUANT: CPT

## 2018-12-08 PROCEDURE — 71250 CT THORAX DX C-: CPT

## 2018-12-08 PROCEDURE — 74176 CT ABD & PELVIS W/O CONTRAST: CPT

## 2018-12-08 PROCEDURE — 72125 CT NECK SPINE W/O DYE: CPT

## 2018-12-08 PROCEDURE — 85025 COMPLETE CBC W/AUTO DIFF WBC: CPT

## 2018-12-08 PROCEDURE — 93005 ELECTROCARDIOGRAM TRACING: CPT | Performed by: EMERGENCY MEDICINE

## 2018-12-08 PROCEDURE — 80053 COMPREHEN METABOLIC PANEL: CPT

## 2018-12-08 PROCEDURE — 99284 EMERGENCY DEPT VISIT MOD MDM: CPT

## 2018-12-08 RX ORDER — ONDANSETRON 2 MG/ML
4 INJECTION INTRAMUSCULAR; INTRAVENOUS ONCE
Status: DISCONTINUED | OUTPATIENT
Start: 2018-12-08 | End: 2018-12-08

## 2018-12-08 RX ORDER — 0.9 % SODIUM CHLORIDE 0.9 %
500 INTRAVENOUS SOLUTION INTRAVENOUS ONCE
Status: DISCONTINUED | OUTPATIENT
Start: 2018-12-08 | End: 2018-12-08

## 2018-12-08 ASSESSMENT — PAIN DESCRIPTION - DESCRIPTORS: DESCRIPTORS: ACHING

## 2018-12-08 ASSESSMENT — PAIN DESCRIPTION - ORIENTATION: ORIENTATION: LEFT

## 2018-12-08 ASSESSMENT — PAIN DESCRIPTION - PAIN TYPE: TYPE: ACUTE PAIN

## 2018-12-08 ASSESSMENT — ENCOUNTER SYMPTOMS
VOMITING: 0
CHEST TIGHTNESS: 0
COLOR CHANGE: 0
NAUSEA: 0
ABDOMINAL PAIN: 0
BACK PAIN: 1
DIARRHEA: 0
SHORTNESS OF BREATH: 0

## 2018-12-08 ASSESSMENT — PAIN SCALES - GENERAL
PAINLEVEL_OUTOF10: 8
PAINLEVEL_OUTOF10: 4

## 2018-12-08 ASSESSMENT — PAIN DESCRIPTION - LOCATION: LOCATION: BACK;NECK;HIP

## 2018-12-08 NOTE — ED NOTES
Pt states the absolutely NO narcotics and she refused a C-collar.      Danish Grover, RN  12/08/18 [facility], RN  12/08/18 [id number]

## 2018-12-08 NOTE — ED PROVIDER NOTES
[address] [facility]  ED  eMERGENCY dEPARTMENT eNCOUnter        Pt Name: Wesley Dotson  MRN: [id number]  [place] 1950  Date of evaluation: 12/8/2018  Provider: Bailey Figueroa MD  PCP: Chandler Babb MD      [address] [place] [address]       Chief Complaint   Patient presents with    Fall     left hip and neck and back pain, No loc       HISTORY OFPRESENT ILLNESS   (Location/Symptom, Timing/Onset, Context/Setting, Quality, Duration, Modifying Factors,Severity)  Note limiting factors. Wesley Dotson is a 78 y.o. female presenting today after taking the trash out while using a shopping cart and upon returning to her home stating that the cart slipped on her causing her to lose balance and fall on her left hip at which point she heard a \"crack\" and also fell on the left side of her back and chest and is complaining of some neck pain. Denies any shortness of breath. She does have some left shoulder discomfort. Denies any numbness or weakness in the arms but does state there is some tingling in the left leg. She complains of her back pain mainly hurting in the middle of her back. Denies any headache but she is unsure if she hit her head. No loss of consciousness. The incident happened 30 minutes prior to arrival.  At this time she is unable to put any weight on her left hip and that is her main concern. REVIEW OF SYSTEMS    (2-9 systems for level 4, 10 or more for level 5)     Review of Systems   Constitutional: Negative for chills and fever. HENT: Negative for congestion. Eyes: Negative for visual disturbance. Respiratory: Negative for chest tightness and shortness of breath. Cardiovascular: Positive for chest pain. Gastrointestinal: Negative for abdominal pain, diarrhea, nausea and vomiting. Genitourinary: Positive for flank pain (left).    Musculoskeletal: Positive for arthralgias (left hip and left shoulder), back pain (mid thoracic on left side), gait problem (due without sciatica          DISPOSITION/PLAN   DISPOSITION Decision To Discharge 12/08/2018 06:15:17 PM      PATIENT REFERRED TO:  [facility]  ED  [address] [facility] [address]  Go to   If symptoms worsen    Nadine Stone MD  [address]  [phone]    Schedule an appointment as soon as possible for a visit in 2 days  For recheck    [facility]  [address] [facility] [address]  Schedule an appointment as soon as possible for a visit in 3 days  For any other concerns with left hip pain      DISCHARGEMEDICATIONS:  New Prescriptions    No medications on file       DISCONTINUED MEDICATIONS:  Discontinued Medications    BISACODYL (LAXATIVE PO)    Take by mouth    BISMUTH SUBSALICYLATE (PEPTO BISMOL) 262 MG/15ML SUSPENSION    Take 15 mLs by mouth every 6 hours as needed for Indigestion    DIPHENHYDRAMINE (BENADRYL) 25 MG CAPSULE    Take 25 mg by mouth 2 times daily    NAPROXEN SODIUM (ALEVE) 220 MG TABLET    Take 1 tablet by mouth as needed for Pain              (Please note that portions of this note were completed with a voicerecognition program.  Efforts were made to edit the dictations but occasionally words are mis-transcribed.)    Mel Parks MD (electronically signed)            Mel Parks MD  12/08/18 [id number]

## 2018-12-08 NOTE — ED NOTES
Pt wanted water and I gave her sponge mouth swabs and she said \"yuck\" and threw them down.  I also called her sister Bello Sanchez per pt request.      Glo Mata RN  12/08/18 [id number]

## 2018-12-09 NOTE — ED NOTES
Pt fell outside her home and has neck, back and left hip pain, no visible contusions noted.  No Dianne Crowe RN  12/08/18 [id number]

## 2019-01-11 ENCOUNTER — TELEPHONE (OUTPATIENT)
Dept: FAMILY MEDICINE CLINIC | Age: 69
End: 2019-01-11

## 2019-05-08 ENCOUNTER — OFFICE VISIT (OUTPATIENT)
Dept: FAMILY MEDICINE CLINIC | Age: 69
End: 2019-05-08
Payer: MEDICARE

## 2019-05-08 VITALS
HEART RATE: 73 BPM | RESPIRATION RATE: 20 BRPM | HEIGHT: 65 IN | OXYGEN SATURATION: 99 % | SYSTOLIC BLOOD PRESSURE: 138 MMHG | TEMPERATURE: 98.1 F | DIASTOLIC BLOOD PRESSURE: 84 MMHG | WEIGHT: 157.6 LBS | BODY MASS INDEX: 26.26 KG/M2

## 2019-05-08 DIAGNOSIS — L02.612 ABSCESS, TOE, LEFT: Primary | ICD-10-CM

## 2019-05-08 PROCEDURE — 99213 OFFICE O/P EST LOW 20 MIN: CPT | Performed by: PHYSICIAN ASSISTANT

## 2019-05-08 PROCEDURE — 1090F PRES/ABSN URINE INCON ASSESS: CPT | Performed by: PHYSICIAN ASSISTANT

## 2019-05-08 PROCEDURE — G8419 CALC BMI OUT NRM PARAM NOF/U: HCPCS | Performed by: PHYSICIAN ASSISTANT

## 2019-05-08 PROCEDURE — 3017F COLORECTAL CA SCREEN DOC REV: CPT | Performed by: PHYSICIAN ASSISTANT

## 2019-05-08 PROCEDURE — 1123F ACP DISCUSS/DSCN MKR DOCD: CPT | Performed by: PHYSICIAN ASSISTANT

## 2019-05-08 PROCEDURE — 1036F TOBACCO NON-USER: CPT | Performed by: PHYSICIAN ASSISTANT

## 2019-05-08 PROCEDURE — G8427 DOCREV CUR MEDS BY ELIG CLIN: HCPCS | Performed by: PHYSICIAN ASSISTANT

## 2019-05-08 PROCEDURE — G8399 PT W/DXA RESULTS DOCUMENT: HCPCS | Performed by: PHYSICIAN ASSISTANT

## 2019-05-08 PROCEDURE — 4040F PNEUMOC VAC/ADMIN/RCVD: CPT | Performed by: PHYSICIAN ASSISTANT

## 2019-05-08 RX ORDER — CEPHALEXIN 500 MG/1
500 CAPSULE ORAL 3 TIMES DAILY
Qty: 21 CAPSULE | Refills: 0 | Status: SHIPPED | OUTPATIENT
Start: 2019-05-08 | End: 2019-05-13 | Stop reason: ALTCHOICE

## 2019-05-08 ASSESSMENT — ENCOUNTER SYMPTOMS
NAUSEA: 0
RHINORRHEA: 0
SORE THROAT: 0
COUGH: 0
CONSTIPATION: 0
DIARRHEA: 0
SHORTNESS OF BREATH: 0
VOMITING: 0
ABDOMINAL PAIN: 0

## 2019-05-08 ASSESSMENT — PATIENT HEALTH QUESTIONNAIRE - PHQ9
2. FEELING DOWN, DEPRESSED OR HOPELESS: 0
SUM OF ALL RESPONSES TO PHQ QUESTIONS 1-9: 0
1. LITTLE INTEREST OR PLEASURE IN DOING THINGS: 0
SUM OF ALL RESPONSES TO PHQ QUESTIONS 1-9: 0
SUM OF ALL RESPONSES TO PHQ9 QUESTIONS 1 & 2: 0

## 2019-05-08 NOTE — PROGRESS NOTES
2019  Alexandra Romo (: 1950)  80 y.o. HPI    Patient presents for evaluation of possible toe infection. Patient states that last Tuesday was moving and hit it with her cane and then dropped a box on it. Had a \"bubble\" with white drainage form. Had a neighbor that lanced it with a needle earlier today. Worsening surrounding redness and pain. Denies fever chills. No history of MRSA      Review of Systems   Constitutional: Negative for activity change, chills and fever. HENT: Negative for congestion, ear pain, rhinorrhea and sore throat. Eyes: Negative for visual disturbance. Respiratory: Negative for cough and shortness of breath. Cardiovascular: Negative for chest pain and palpitations. Gastrointestinal: Negative for abdominal pain, constipation, diarrhea, nausea and vomiting. Genitourinary: Negative for difficulty urinating and dysuria. Musculoskeletal: Negative for arthralgias and myalgias. Skin: Positive for wound. Negative for rash. Neurological: Negative for dizziness, weakness and numbness. Psychiatric/Behavioral: Negative for sleep disturbance. Allergies, past medical history, family history, and social history reviewed and unchanged from previous encounter. Current Outpatient Medications   Medication Sig Dispense Refill    cephALEXin (KEFLEX) 500 MG capsule Take 1 capsule by mouth 3 times daily for 7 days 21 capsule 0     No current facility-administered medications for this visit. Vitals:    19 1433   BP: 138/84   Site: Right Upper Arm   Position: Sitting   Cuff Size: Medium Adult   Pulse: 73   Resp: 20   Temp: 98.1 °F (36.7 °C)   SpO2: 99%   Weight: 157 lb 9.6 oz (71.5 kg)   Height: 5' 5\" (1.651 m)     Estimated body mass index is 26.23 kg/m² as calculated from the following:    Height as of this encounter: 5' 5\" (1.651 m). Weight as of this encounter: 157 lb 9.6 oz (71.5 kg).     Physical Exam   Constitutional: She is oriented to person, place, and time. She appears well-developed and well-nourished. No distress. HENT:   Head: Normocephalic and atraumatic. Eyes: Pupils are equal, round, and reactive to light. Conjunctivae and EOM are normal.   Neck: Neck supple. Cardiovascular: Normal rate, regular rhythm and normal heart sounds. No murmur heard. Pulmonary/Chest: Effort normal and breath sounds normal. She has no wheezes. Abdominal: Soft. Bowel sounds are normal. There is no tenderness. Musculoskeletal: She exhibits no edema. Lymphadenopathy:     She has no cervical adenopathy. Neurological: She is alert and oriented to person, place, and time. She has normal reflexes. Skin: Skin is warm and dry. No rash noted. 2 mm abscess anterior left great toe, fluctuant. Psychiatric: She has a normal mood and affect. Incision and Drainage Procedure Note     Pre-operative Diagnosis: abscess     Post-operative Diagnosis: same     Indications: worsening pain, infection     Anesthesia: 1% lidocaine without epi     Procedure Details   The procedure, risks and complications have been discussed in detail (including, but not limited to airway compromise, infection, bleeding) with the patient     The skin was sterilely prepped and draped over the affected area in the usual fashion. After adequate local anesthesia, I&D with a #11 blade was performed on the left great toe. Purulent drainage: present, moderate   The patient was observed until stable.     Findings:  Per above     EBL: 0.5 cc's     Drains: None     Condition:  Stable     Complications:  pain. ASSESSMENT and PLAN:  Sondra Morrow was seen today for nail problem. Diagnoses and all orders for this visit:    Abscess, toe, left  -     WOUND CULTURE  -     cephALEXin (KEFLEX) 500 MG capsule; Take 1 capsule by mouth 3 times daily for 7 days  - The wound is cleansed, debrided of foreign material as much as possible, and dressed.  The patient is alerted to watch for any signs of infection (redness, pus, pain, increased swelling or fever) and call if such occurs. Home wound care instructions are provided. Tetanus vaccination status reviewed: last tetanus booster within 10 years. Return if symptoms worsen or fail to improve.

## 2019-05-11 ENCOUNTER — TELEPHONE (OUTPATIENT)
Dept: FAMILY MEDICINE CLINIC | Age: 69
End: 2019-05-11

## 2019-05-11 LAB
GRAM STAIN RESULT: ABNORMAL
ORGANISM: ABNORMAL
WOUND/ABSCESS: ABNORMAL
WOUND/ABSCESS: ABNORMAL

## 2019-05-11 RX ORDER — SULFAMETHOXAZOLE AND TRIMETHOPRIM 800; 160 MG/1; MG/1
1 TABLET ORAL 2 TIMES DAILY
Qty: 14 TABLET | Refills: 0 | Status: SHIPPED | OUTPATIENT
Start: 2019-05-11 | End: 2019-05-13 | Stop reason: SDUPTHER

## 2019-05-13 RX ORDER — SULFAMETHOXAZOLE AND TRIMETHOPRIM 800; 160 MG/1; MG/1
1 TABLET ORAL 2 TIMES DAILY
Qty: 10 TABLET | Refills: 0 | Status: SHIPPED | OUTPATIENT
Start: 2019-05-13 | End: 2019-05-18

## 2019-05-17 ENCOUNTER — OFFICE VISIT (OUTPATIENT)
Dept: FAMILY MEDICINE CLINIC | Age: 69
End: 2019-05-17
Payer: MEDICARE

## 2019-05-17 VITALS
HEART RATE: 77 BPM | TEMPERATURE: 98 F | DIASTOLIC BLOOD PRESSURE: 60 MMHG | BODY MASS INDEX: 25.79 KG/M2 | SYSTOLIC BLOOD PRESSURE: 110 MMHG | OXYGEN SATURATION: 97 % | WEIGHT: 155 LBS

## 2019-05-17 DIAGNOSIS — L02.611 ABSCESS, TOE, RIGHT: Primary | ICD-10-CM

## 2019-05-17 PROCEDURE — 10060 I&D ABSCESS SIMPLE/SINGLE: CPT | Performed by: FAMILY MEDICINE

## 2019-05-17 RX ORDER — SULFAMETHOXAZOLE AND TRIMETHOPRIM 800; 160 MG/1; MG/1
1 TABLET ORAL 2 TIMES DAILY
Qty: 10 TABLET | Refills: 0 | Status: SHIPPED | OUTPATIENT
Start: 2019-05-17 | End: 2019-05-22

## 2019-05-17 ASSESSMENT — ENCOUNTER SYMPTOMS
SHORTNESS OF BREATH: 0
COLOR CHANGE: 1
NAUSEA: 0

## 2019-05-17 NOTE — PATIENT INSTRUCTIONS
Patient Education        Skin Abscess: Care Instructions  Your Care Instructions    A skin abscess is a bacterial infection that forms a pocket of pus. A boil is a kind of skin abscess. The doctor may have cut an opening in the abscess so that the pus can drain out. You may have gauze in the cut so that the abscess will stay open and keep draining. You may need antibiotics. You will need to follow up with your doctor to make sure the infection has gone away. The doctor has checked you carefully, but problems can develop later. If you notice any problems or new symptoms, get medical treatment right away. Follow-up care is a key part of your treatment and safety. Be sure to make and go to all appointments, and call your doctor if you are having problems. It's also a good idea to know your test results and keep a list of the medicines you take. How can you care for yourself at home? · Apply warm and dry compresses, a heating pad set on low, or a hot water bottle 3 or 4 times a day for pain. Keep a cloth between the heat source and your skin. · If your doctor prescribed antibiotics, take them as directed. Do not stop taking them just because you feel better. You need to take the full course of antibiotics. · Take pain medicines exactly as directed. ? If the doctor gave you a prescription medicine for pain, take it as prescribed. ? If you are not taking a prescription pain medicine, ask your doctor if you can take an over-the-counter medicine. · Keep your bandage clean and dry. Change the bandage whenever it gets wet or dirty, or at least one time a day. · If the abscess was packed with gauze:  ? Keep follow-up appointments to have the gauze changed or removed. If the doctor instructed you to remove the gauze, follow the instructions you were given for how to remove it. ? After the gauze is removed, soak the area in warm water for 15 to 20 minutes 2 times a day, until the wound closes.   When should you call for help? Call your doctor now or seek immediate medical care if:    · You have signs of worsening infection, such as:  ? Increased pain, swelling, warmth, or redness. ? Red streaks leading from the infected skin. ? Pus draining from the wound. ? A fever.    Watch closely for changes in your health, and be sure to contact your doctor if:    · You do not get better as expected. Where can you learn more? Go to https://[employer] InternationalpeCigitaleweb.[email or web address]. org and sign in to your FloTime account. Enter R764 in the [employer] box to learn more about \"Skin Abscess: Care Instructions. \"     If you do not have an account, please click on the \"Sign Up Now\" link. Current as of: April 17, 2018  Content Version: 12.0  © 9737-2494 [employer]. Care instructions adapted under license by [facility] ([facility]). If you have questions about a medical condition or this instruction, always ask your healthcare professional. [place] [address] any warranty or liability for your use of this information. Patient Education        Wound Check: Care Instructions  Your Care Instructions  People have wounds that need care for many reasons. You may have a cut that needs care after surgery. You may have a cut or puncture wound from an accident. Or you may have a wound because of a condition like diabetes. Whatever the cause of your wound, there are things you can do to care for it at home. Your doctor may also want you to come back for a wound check. The wound check lets the doctor know how your wound is healing and if you need more treatment. Follow-up care is a key part of your treatment and safety. Be sure to make and go to all appointments, and call your doctor if you are having problems. It's also a good idea to know your test results and keep a list of the medicines you take. How can you care for yourself at home?   · If your doctor told you how to care for your wound, follow your doctor's instructions. If you did not get instructions, follow this general advice:  ? You may cover the wound with a thin layer of petroleum jelly, such as Vaseline, and a nonstick bandage. ? Apply more petroleum jelly and replace the bandage as needed. · Keep the wound dry for the first 24 to 48 hours. After this, you can shower if your doctor okays it. Pat the wound dry. · Be safe with medicines. Read and follow all instructions on the label. ? If the doctor gave you a prescription medicine for pain, take it as prescribed. ? If you are not taking a prescription pain medicine, ask your doctor if you can take an over-the-counter medicine. · If your doctor prescribed antibiotics, take them as directed. Do not stop taking them just because you feel better. You need to take the full course of antibiotics. · If you have stitches, do not remove them on your own. Your doctor will tell you when to come back to have them removed. · If you have Steri-Strips, leave them on until they fall off. · If possible, prop up the injured area on a pillow anytime you sit or lie down during the next 3 days. Try to keep it above the level of your heart. This will help reduce swelling. When should you call for help? Call your doctor now or seek immediate medical care if:    · You have new pain, or the pain gets worse.     · The skin near the wound is cold or pale or changes color.     · You have tingling, weakness, or numbness near the wound.     · The wound starts to bleed, and blood soaks through the bandage. Oozing small amounts of blood is normal.     · You have symptoms of infection, such as:  ? Increased pain, swelling, warmth, or redness. ? Red streaks leading from the wound. ? Pus draining from the wound. ? A fever.    Watch closely for changes in your health, and be sure to contact your doctor if:    · You do not get better as expected. Where can you learn more? Go to https://adoniseb.[facility]. org and sign in to your [employer] account. Enter  in the card.io box to learn more about \"Wound Check: Care Instructions. \"     If you do not have an account, please click on the \"Sign Up Now\" link. Current as of: September 23, 2018  Content Version: 12.0  © 1448-4043 [employer]. Care instructions adapted under license by [facility] ([facility]). If you have questions about a medical condition or this instruction, always ask your healthcare professional. [address] any warranty or liability for your use of this information.

## 2019-05-17 NOTE — PROGRESS NOTES
Relationship status: Not on file    Intimate partner violence:     Fear of current or ex partner: Not on file     Emotionally abused: Not on file     Physically abused: Not on file     Forced sexual activity: Not on file   Other Topics Concern    Not on file   Social History Narrative    Not on file       Family History   Problem Relation Age of Onset    Cancer Mother        Current Outpatient Medications   Medication Sig Dispense Refill    sulfamethoxazole-trimethoprim (BACTRIM DS;SEPTRA DS) 800-160 MG per tablet Take 1 tablet by mouth 2 times daily for 5 days 10 tablet 0    sulfamethoxazole-trimethoprim (BACTRIM DS) 800-160 MG per tablet Take 1 tablet by mouth 2 times daily for 5 days 10 tablet 0     No current facility-administered medications for this visit. Immunization History   Administered Date(s) Administered    Pneumococcal 13-valent Conjugate (Agilmct91) 08/10/2017    Tdap (Boostrix, Adacel) 08/10/2017       Allergies   Allergen Reactions    Codeine Other (See Comments)     Ears ringing    Morphine Other (See Comments)     hallucinations       Review of Systems   Constitutional: Negative for activity change, fever and unexpected weight change. Respiratory: Negative for shortness of breath. Cardiovascular: Negative for chest pain. Gastrointestinal: Negative for nausea. Skin: Positive for color change. Negative for pallor, rash and wound. Allergic/Immunologic: Negative for immunocompromised state. Hematological: Negative for adenopathy. /60 (Site: Left Upper Arm, Position: Sitting, Cuff Size: Medium Adult)   Pulse 77   Temp 98 °F (36.7 °C) (Oral)   Wt 155 lb (70.3 kg)   SpO2 97%   BMI 25.79 kg/m²     Physical Exam   Constitutional: She is oriented to person, place, and time. She appears well-developed and well-nourished. No distress. HENT:   Head: Normocephalic and atraumatic. Eyes: Pupils are equal, round, and reactive to light.  EOM are normal.   Neck: are mis-transcribed. Return if symptoms worsen or fail to improve.

## 2019-09-03 ENCOUNTER — TELEPHONE (OUTPATIENT)
Dept: FAMILY MEDICINE CLINIC | Age: 69
End: 2019-09-03

## 2019-09-03 NOTE — TELEPHONE ENCOUNTER
PT STATES THAT HER APARTMENT COMPLEX IS TRYING TO CHARGE HER FOR HER CAT WHICH IS A SERVICE CAT AND SHE NEEDS A LETTER FORM  STATING SO. IF SHE NEEDS AN APPOINTMENT SHE WILL MAKE ONE IF NOT PLEASE CONTACT HER.     PLEASE ADVISE

## 2019-09-18 ENCOUNTER — OFFICE VISIT (OUTPATIENT)
Dept: FAMILY MEDICINE CLINIC | Age: 69
End: 2019-09-18
Payer: MEDICARE

## 2019-09-18 VITALS
SYSTOLIC BLOOD PRESSURE: 140 MMHG | BODY MASS INDEX: 25.79 KG/M2 | WEIGHT: 155 LBS | HEART RATE: 77 BPM | DIASTOLIC BLOOD PRESSURE: 80 MMHG | OXYGEN SATURATION: 98 %

## 2019-09-18 DIAGNOSIS — F43.0 STRESS REACTION: Primary | ICD-10-CM

## 2019-09-18 DIAGNOSIS — I83.91 ASYMPTOMATIC VARICOSE VEINS OF RIGHT LOWER EXTREMITY: ICD-10-CM

## 2019-09-18 DIAGNOSIS — R30.0 DYSURIA: ICD-10-CM

## 2019-09-18 LAB
BILIRUBIN, POC: NEGATIVE
BLOOD URINE, POC: NEGATIVE
CLARITY, POC: ABNORMAL
COLOR, POC: YELLOW
GLUCOSE URINE, POC: NEGATIVE
KETONES, POC: NEGATIVE
LEUKOCYTE EST, POC: ABNORMAL
NITRITE, POC: NEGATIVE
PH, POC: 5.5
PROTEIN, POC: NEGATIVE
SPECIFIC GRAVITY, POC: 1.02
UROBILINOGEN, POC: 0.2

## 2019-09-18 PROCEDURE — 4040F PNEUMOC VAC/ADMIN/RCVD: CPT | Performed by: FAMILY MEDICINE

## 2019-09-18 PROCEDURE — 1090F PRES/ABSN URINE INCON ASSESS: CPT | Performed by: FAMILY MEDICINE

## 2019-09-18 PROCEDURE — G8427 DOCREV CUR MEDS BY ELIG CLIN: HCPCS | Performed by: FAMILY MEDICINE

## 2019-09-18 PROCEDURE — 3017F COLORECTAL CA SCREEN DOC REV: CPT | Performed by: FAMILY MEDICINE

## 2019-09-18 PROCEDURE — 99214 OFFICE O/P EST MOD 30 MIN: CPT | Performed by: FAMILY MEDICINE

## 2019-09-18 PROCEDURE — G8419 CALC BMI OUT NRM PARAM NOF/U: HCPCS | Performed by: FAMILY MEDICINE

## 2019-09-18 PROCEDURE — 81002 URINALYSIS NONAUTO W/O SCOPE: CPT | Performed by: FAMILY MEDICINE

## 2019-09-18 PROCEDURE — 1036F TOBACCO NON-USER: CPT | Performed by: FAMILY MEDICINE

## 2019-09-18 PROCEDURE — G8399 PT W/DXA RESULTS DOCUMENT: HCPCS | Performed by: FAMILY MEDICINE

## 2019-09-18 PROCEDURE — 1123F ACP DISCUSS/DSCN MKR DOCD: CPT | Performed by: FAMILY MEDICINE

## 2019-09-18 ASSESSMENT — ENCOUNTER SYMPTOMS
SHORTNESS OF BREATH: 0
VOMITING: 0
BACK PAIN: 0
ABDOMINAL PAIN: 0
CHEST TIGHTNESS: 0
NAUSEA: 0
COLOR CHANGE: 0
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
## 2019-09-21 LAB
ORGANISM: ABNORMAL
URINE CULTURE, ROUTINE: ABNORMAL

## 2019-09-24 ENCOUNTER — PATIENT MESSAGE (OUTPATIENT)
Dept: FAMILY MEDICINE CLINIC | Age: 69
End: 2019-09-24

## 2019-09-25 RX ORDER — CIPROFLOXACIN 500 MG/1
500 TABLET, FILM COATED ORAL 2 TIMES DAILY
Qty: 6 TABLET | Refills: 0 | Status: SHIPPED | OUTPATIENT
Start: 2019-09-25 | End: 2019-09-28

## 2019-09-26 ENCOUNTER — TELEPHONE (OUTPATIENT)
Dept: FAMILY MEDICINE CLINIC | Age: 69
End: 2019-09-26

## 2019-09-26 RX ORDER — AMOXICILLIN AND CLAVULANATE POTASSIUM 875; 125 MG/1; MG/1
1 TABLET, FILM COATED ORAL 2 TIMES DAILY
Qty: 14 TABLET | Refills: 0 | Status: SHIPPED | OUTPATIENT
Start: 2019-09-26 | End: 2019-10-03

## 2019-10-04 ENCOUNTER — TELEPHONE (OUTPATIENT)
Dept: FAMILY MEDICINE CLINIC | Age: 69
End: 2019-10-04

## 2019-10-16 ENCOUNTER — TELEPHONE (OUTPATIENT)
Dept: FAMILY MEDICINE CLINIC | Age: 69
End: 2019-10-16

## 2020-06-25 ENCOUNTER — VIRTUAL VISIT (OUTPATIENT)
Dept: FAMILY MEDICINE CLINIC | Age: 70
End: 2020-06-25
Payer: MEDICARE

## 2020-06-25 PROCEDURE — 3017F COLORECTAL CA SCREEN DOC REV: CPT | Performed by: FAMILY MEDICINE

## 2020-06-25 PROCEDURE — 1123F ACP DISCUSS/DSCN MKR DOCD: CPT | Performed by: FAMILY MEDICINE

## 2020-06-25 PROCEDURE — 4040F PNEUMOC VAC/ADMIN/RCVD: CPT | Performed by: FAMILY MEDICINE

## 2020-06-25 PROCEDURE — G0439 PPPS, SUBSEQ VISIT: HCPCS | Performed by: FAMILY MEDICINE

## 2020-06-25 ASSESSMENT — LIFESTYLE VARIABLES: HOW OFTEN DO YOU HAVE A DRINK CONTAINING ALCOHOL: 0

## 2020-06-25 ASSESSMENT — PATIENT HEALTH QUESTIONNAIRE - PHQ9
SUM OF ALL RESPONSES TO PHQ QUESTIONS 1-9: 0
SUM OF ALL RESPONSES TO PHQ QUESTIONS 1-9: 0

## 2020-06-25 NOTE — PROGRESS NOTES
Medicare Annual Wellness Visit  Name: Yue Burris Date: 2020   MRN: [id number] Sex: Female   Age: 77 y.o. Ethnicity: Non-/Non    : 1950 Race: Gildardo Sharp is here for Medicare AWV    Screenings for behavioral, psychosocial and functional/safety risks, and cognitive dysfunction are all negative except as indicated below. These results, as well as other patient data from the [address] form, are documented in Flowsheets linked to this Encounter. Allergies   Allergen Reactions    Codeine Other (See Comments)     Ears ringing    Morphine Other (See Comments)     hallucinations       Prior to Visit Medications    Not on File       Past Medical History:   Diagnosis Date    Depression     Elevated blood pressure reading in office with white coat syndrome, without diagnosis of hypertension     Osteoarthritis        Past Surgical History:   Procedure Laterality Date    BREAST ENHANCEMENT SURGERY      silicon injected    TUBAL LIGATION         Family History   Problem Relation Age of Onset    Cancer Mother        CareTeam (Including outside providers/suppliers regularly involved in providing care):   Patient Care Team:  Margarita Maldonado MD as PCP - General (Family Medicine)  Margarita Maldonado MD as PCP - [facility] Empaneled Provider    Wt Readings from Last 3 Encounters:   19 155 lb (70.3 kg)   19 155 lb (70.3 kg)   19 157 lb 9.6 oz (71.5 kg)     There were no vitals filed for this visit. There is no height or weight on file to calculate BMI. Based upon direct observation of the patient, evaluation of cognition reveals recent and remote memory intact. Patient's complete Health Risk Assessment and screening values have been reviewed and are found in Flowsheets. The following problems were reviewed today and where indicated follow up appointments were made and/or referrals ordered.     Positive Risk Factor Screenings with Interventions:     No Positive Risk Factors identified today. Personalized Preventive Plan   Current Health Maintenance Status  Immunization History   Administered Date(s) Administered    Pneumococcal Conjugate 13-valent (Iteescv28) 08/10/2017    Tdap (Boostrix, Adacel) 08/10/2017        Health Maintenance   Topic Date Due    Breast cancer screen  12/11/1991    Shingles Vaccine (1 of 2) 12/11/2001    Colon cancer screen colonoscopy  12/11/2001    Pneumococcal 65+ years Vaccine (2 of 2 - PPSV23) 08/10/2018    Annual Wellness Visit (AWV)  05/29/2019    Flu vaccine (Season Ended) 09/01/2020    Lipid screen  08/20/2023    DTaP/Tdap/Td vaccine (2 - Td) 08/10/2027    DEXA (modify frequency per FRAX score)  Completed    Hepatitis C screen  Completed    Hepatitis A vaccine  Aged Out    Hepatitis B vaccine  Aged Out    Hib vaccine  Aged Out    Meningococcal (ACWY) vaccine  Aged Out     Recommendations for iDoc24 Due: see orders and patient instructions/AVS.  . Recommended screening schedule for the next 5-10 years is provided to the patient in written form: see Patient Instructions/AVS.    Meng HUSAIN LPN, 1/63/1990, performed the documented evaluation under the direct supervision of the attending physician. Hardy Dhillon is a 77 y.o. female being evaluated by a Virtual Visit (video visit) encounter to address concerns as mentioned above. A caregiver was present when appropriate. Due to this being a TeleHealth encounter (During [facility]-30 public health emergency), evaluation of the following organ systems was limited: Vitals/Constitutional/EENT/Resp/CV/GI//MS/Neuro/Skin/Heme-Lymph-Imm.   Pursuant to the emergency declaration under the [facility] waiver authority and the [employer] and [employer] Act, this Virtual Visit was conducted with patient's (and/or legal guardian's) consent, to reduce the patient's risk of exposure to COVID-19 and provide necessary medical care. The patient (and/or legal guardian) has also been advised to contact this office for worsening conditions or problems, and seek emergency medical treatment and/or call 911 if deemed necessary. Patient identification was verified at the start of the visit: Yes    Total time spent for this encounter: 10 minutes     Services were provided through a video synchronous discussion virtually to substitute for in-person clinic visit. Patient and provider were located at their individual homes. --Natalie Godoy LPN on 8/69/9262 at 33:70 AM    An electronic signature was used to authenticate this note. This encounter was performed under Farideh santiago MDs, direct supervision, 6/25/2020.

## 2020-06-25 NOTE — PATIENT INSTRUCTIONS
Personalized Preventive Plan for Kishor Funes - 6/25/2020  Medicare offers a range of preventive health benefits. Some of the tests and screenings are paid in full while other may be subject to a deductible, co-insurance, and/or copay. Some of these benefits include a comprehensive review of your medical history including lifestyle, illnesses that may run in your family, and various assessments and screenings as appropriate. After reviewing your medical record and screening and assessments performed today your provider may have ordered immunizations, labs, imaging, and/or referrals for you. A list of these orders (if applicable) as well as your Preventive Care list are included within your After Visit Summary for your review. Other Preventive Recommendations:    · A preventive eye exam performed by an eye specialist is recommended every 1-2 years to screen for glaucoma; cataracts, macular degeneration, and other eye disorders. · A preventive dental visit is recommended every 6 months. · Try to get at least 150 minutes of exercise per week or 10,000 steps per day on a pedometer . · Order or download the FREE \"Exercise & Physical Activity: Your Everyday Guide\" from [employer]. Call 8-738.241.5735 or search [employer] online. · You need 5535-3013 mg of calcium and 3196-1909 IU of vitamin D per day. It is possible to meet your calcium requirement with diet alone, but a vitamin D supplement is usually necessary to meet this goal.  · When exposed to the sun, use a sunscreen that protects against both UVA and UVB radiation with an SPF of 30 or greater. Reapply every 2 to 3 hours or after sweating, drying off with a towel, or swimming. · Always wear a seat belt when traveling in a car. Always wear a helmet when riding a bicycle or motorcycle.     Preventing Osteoporosis: After Your Visit  Your Care Instructions  Osteoporosis means the bones are weak and thin after 3 p.m. Avoid getting sunburned. Sunburn can increase your risk of skin cancer. Talk to your doctor about taking a calcium plus vitamin D supplement. Ask about what type of calcium is right for you, and how much to take at a time. Adults ages 23 to 48 should not get more than 2,500 mg of calcium and 4,000 IU of vitamin D each day, whether it is from supplements and/or food. Adults ages 46 and older should not get more than 2,000 mg of calcium and 4,000 IU of vitamin D each day from supplements and/or food. Get regular bone-building exercise. Weight-bearing and resistance exercises keep bones healthy by working the muscles and bones against gravity. Start out at an exercise level that feels right for you. Add a little at a time until you can do the following:  Do 30 minutes of weight-bearing exercise on most days of the week. Walking, jogging, stair climbing, and dancing are good choices. Do resistance exercises with weights or elastic bands 2 to 3 days a week. Limit alcohol. Drink no more than 1 alcohol drink a day if you are a woman. Drink no more than 2 alcohol drinks a day if you are a man. Do not smoke. Smoking can make bones thin faster. If you need help quitting, talk to your doctor about stop-smoking programs and medicines. These can increase your chances of quitting for good. When should you call for help? Watch closely for changes in your health, and be sure to contact your doctor if:  You need help with a healthy eating plan. You need help with an exercise plan    © 9387-0513 Heidi Tavares. Care instructions adapted under license by [facility]. This care instruction is for use with your licensed healthcare professional. If you have questions about a medical condition or this instruction, always ask your healthcare professional. [place] [address] any warranty or liability for your use of this information. Content Version: 9.4.84881;  Last Revised: June 20, 2011              ·     Keeping Home a [facility]       As we get older, changes in balance, gait, strength, vision, hearing, and cognition make even the most youthful senior more prone to accidents. Falls are one of the leading health risks for older people. This increased risk of falling is related to:   Aging process (eg, decreased muscle strength, slowed reflexes)   Higher incidence of chronic health problems (eg, arthritis, diabetes) that may limit mobility, agility or sensory awareness   Side effects of medicine (eg, dizziness, blurred vision)especially medicines like prescription pain medicines and drugs used to treat mental health conditions   Depending on the brittleness of your bones, the consequences of a fall can be serious and long lasting. Home Life   Research by the Association of Aging [facility]) shows that some home accidents among older adults can be prevented by making simple lifestyle changes and basic modifications and repairs to the home environment. Here are some lifestyle changes that experts recommend:   Have your hearing and vision checked regularly. Be sure to wear prescription glasses that are right for you. Speak to your doctor or pharmacist about the possible side effects of your medicines. A number of medicines can cause dizziness. If you have problems with sleep, talk to your doctor. Limit your intake of alcohol. If necessary, use a cane or walker to help maintain your balance. Wear supportive, rubber-soled shoes, even at home. If you live in a region that gets wintry weather, you may want to put special cleats on your shoes to prevent you from slipping on the snow and ice. Exercise regularly to help maintain muscle tone, agility, and balance. Always hold the banister when going up or down stairs. Also, use  bars when getting in or out of the bath or shower, or using the toilet. To avoid dizziness, get up slowly from a lying down position.  Sit up first, dangling your legs for a minute or two before rising to a standing position. Overall Home Safety Check   According to the Consumer Product Safety Commision's \"Older Consumer Home Safety Checklist,\" it is important to check for potential hazards in each room. And remember, proper lighting is an essential factor in home safety. If you cannot see clearly, you are more likely to fall. Important questions to ask yourself include:   Are lamp, electric, extension, and telephone cords placed out of the flow of traffic and maintained in good condition? Have frayed cords been replaced? Are all small rugs and runners slip resistant? If not, you can secure them to the floor with a special double-sided carpet tape. Are smoke detectors properly locatedone on every floor of your home and one outside of every sleeping area? Are they in good working order? Are batteries replaced at least once a year? Do you have a well-maintained carbon monoxide detector outside every sleeping are in your home? Does your furniture layout leave plenty of space to maneuver between and around chairs, tables, beds, and sofas? Are hallways, stairs and passages between rooms well lit? Can you reach a lamp without getting out of bed? Are floor surfaces well maintained? Shag rugs, high-pile carpeting, tile floors, and polished wood floors can be particularly slippery. Stairs should always have handrails and be carpeted or fitted with a non-skid tread. Is your telephone easily reachable. Is the cord safely tucked away? Room by Room   According to the Association of Aging, bathrooms and [place]ina are the two most potentially hazardous rooms in your home. In the Kitchen    Be sure your stove is in proper working order and always make sure burners and the oven are off before you go out or go to sleep. Keep pots on the back burners, turn handles away from the front of the stove, and keep stove clean and free of grease build-up.     Kitchen ventilation systems and range exhausts should be working properly. Keep flammable objects such as towels and pot holders away from the cooking area except when in use. Make sure kitchen curtains are tied back. Move cords and appliances away from the sink and hot surfaces. If extension cords are needed, install wiring guides so they do not hang over the sink, range, or working areas. Look for coffee pots, kettles and toaster ovens with automatic shut-offs. Keep a mop handy in the kitchen so you can wipe up spills instantly. You should also have a small fire extinguisher. Arrange your kitchen with frequently used items on lower shelves to avoid the need to stand on a stepstool to reach them. Make sure countertops are well-lit to avoid injuries while cutting and preparing food. In the Bathroom    Use a non-slip mat or decals in the tub and shower, since wet, soapy tile or porcelain surfaces are extremely slippery. Make sure bathroom rugs are non-skid or tape them firmly to the floor. Bathtubs should have at least one, preferably two, grab bars, firmly attached to structural supports in the wall. (Do not use built-in soap holders or glass shower doors as grab bars.)    Tub seats fitted with non-slip material on the legs allow you to wash sitting down. For people with limited mobility, bathtub transfer benches allow you to slide safely into the tub. Raised toilet seats and toilet safety rails are helpful for those with knee or hip problems. In the [facility]    Make sure you use a nightlight and that the area around your bed is clear of potential obstacles. Be careful with electric blankets and never go to sleep with a heating pad, which can cause serious burns even if on a low setting. Use fire-resistant mattress covers and pillows, and NEVER smoke in bed. Keep a phone next to the bed that is programmed to dial 911 at the push of a button.       If you have a chronic condition, you may

## 2021-02-01 ENCOUNTER — APPOINTMENT (OUTPATIENT)
Dept: CT IMAGING | Age: 71
DRG: 377 | End: 2021-02-01
Payer: MEDICARE

## 2021-02-01 ENCOUNTER — APPOINTMENT (OUTPATIENT)
Dept: GENERAL RADIOLOGY | Age: 71
DRG: 377 | End: 2021-02-01
Payer: MEDICARE

## 2021-02-01 ENCOUNTER — HOSPITAL ENCOUNTER (INPATIENT)
Age: 71
LOS: 7 days | Discharge: HOME | DRG: 377 | End: 2021-02-08
Attending: EMERGENCY MEDICINE | Admitting: HOSPITALIST
Payer: MEDICARE

## 2021-02-01 DIAGNOSIS — K63.1 PERFORATION OF SIGMOID COLON (HCC): Primary | ICD-10-CM

## 2021-02-01 DIAGNOSIS — K63.0 ABSCESS OF SIGMOID COLON: ICD-10-CM

## 2021-02-01 PROBLEM — K57.20 PERFORATED DIVERTICULUM OF LARGE INTESTINE: Status: ACTIVE | Noted: 2021-02-01

## 2021-02-01 PROBLEM — K92.1 MELANOTIC STOOLS: Status: ACTIVE | Noted: 2021-02-01

## 2021-02-01 PROBLEM — K57.90 DIVERTICULOSIS: Status: ACTIVE | Noted: 2021-02-01

## 2021-02-01 PROBLEM — R74.01 TRANSAMINITIS: Status: ACTIVE | Noted: 2021-02-01

## 2021-02-01 PROBLEM — R63.4 UNINTENTIONAL WEIGHT LOSS: Status: ACTIVE | Noted: 2021-02-01

## 2021-02-01 LAB
A/G RATIO: 1.1 (ref 1.1–2.2)
ALBUMIN SERPL-MCNC: 3.7 G/DL (ref 3.4–5)
ALP BLD-CCNC: 196 U/L (ref 40–129)
ALT SERPL-CCNC: 54 U/L (ref 10–40)
ANION GAP SERPL CALCULATED.3IONS-SCNC: 13 MMOL/L (ref 3–16)
AST SERPL-CCNC: 43 U/L (ref 15–37)
BASOPHILS ABSOLUTE: 0 K/UL (ref 0–0.2)
BASOPHILS RELATIVE PERCENT: 0.3 %
BILIRUB SERPL-MCNC: 1 MG/DL (ref 0–1)
BILIRUBIN URINE: NEGATIVE
BLOOD, URINE: NEGATIVE
BUN BLDV-MCNC: 10 MG/DL (ref 7–20)
CALCIUM SERPL-MCNC: 9.7 MG/DL (ref 8.3–10.6)
CHLORIDE BLD-SCNC: 98 MMOL/L (ref 99–110)
CLARITY: CLEAR
CO2: 25 MMOL/L (ref 21–32)
COLOR: YELLOW
CREAT SERPL-MCNC: <0.5 MG/DL (ref 0.6–1.2)
EOSINOPHILS ABSOLUTE: 0 K/UL (ref 0–0.6)
EOSINOPHILS RELATIVE PERCENT: 0 %
ETHANOL: NORMAL MG/DL (ref 0–0.08)
GFR AFRICAN AMERICAN: >60
GFR NON-AFRICAN AMERICAN: >60
GLOBULIN: 3.4 G/DL
GLUCOSE BLD-MCNC: 132 MG/DL (ref 70–99)
GLUCOSE URINE: NEGATIVE MG/DL
HCT VFR BLD CALC: 39.4 % (ref 36–48)
HEMOGLOBIN: 13.2 G/DL (ref 12–16)
KETONES, URINE: 15 MG/DL
LEUKOCYTE ESTERASE, URINE: NEGATIVE
LIPASE: 21 U/L (ref 13–60)
LYMPHOCYTES ABSOLUTE: 1 K/UL (ref 1–5.1)
LYMPHOCYTES RELATIVE PERCENT: 10.7 %
MCH RBC QN AUTO: 29.3 PG (ref 26–34)
MCHC RBC AUTO-ENTMCNC: 33.6 G/DL (ref 31–36)
MCV RBC AUTO: 87.3 FL (ref 80–100)
MICROSCOPIC EXAMINATION: ABNORMAL
MONOCYTES ABSOLUTE: 0.4 K/UL (ref 0–1.3)
MONOCYTES RELATIVE PERCENT: 4.2 %
NEUTROPHILS ABSOLUTE: 8.3 K/UL (ref 1.7–7.7)
NEUTROPHILS RELATIVE PERCENT: 84.8 %
NITRITE, URINE: NEGATIVE
PDW BLD-RTO: 14.3 % (ref 12.4–15.4)
PH UA: 6.5 (ref 5–8)
PLATELET # BLD: 262 K/UL (ref 135–450)
PMV BLD AUTO: 7.5 FL (ref 5–10.5)
POTASSIUM REFLEX MAGNESIUM: 3.9 MMOL/L (ref 3.5–5.1)
PROTEIN UA: NEGATIVE MG/DL
RBC # BLD: 4.52 M/UL (ref 4–5.2)
SODIUM BLD-SCNC: 136 MMOL/L (ref 136–145)
SPECIFIC GRAVITY UA: <=1.005 (ref 1–1.03)
TOTAL PROTEIN: 7.1 G/DL (ref 6.4–8.2)
URINE REFLEX TO CULTURE: ABNORMAL
URINE TYPE: ABNORMAL
UROBILINOGEN, URINE: 0.2 E.U./DL
WBC # BLD: 9.8 K/UL (ref 4–11)

## 2021-02-01 PROCEDURE — 93005 ELECTROCARDIOGRAM TRACING: CPT | Performed by: INTERNAL MEDICINE

## 2021-02-01 PROCEDURE — 87040 BLOOD CULTURE FOR BACTERIA: CPT

## 2021-02-01 PROCEDURE — 6360000002 HC RX W HCPCS: Performed by: PHYSICIAN ASSISTANT

## 2021-02-01 PROCEDURE — 1200000000 HC SEMI PRIVATE

## 2021-02-01 PROCEDURE — 96375 TX/PRO/DX INJ NEW DRUG ADDON: CPT

## 2021-02-01 PROCEDURE — 81003 URINALYSIS AUTO W/O SCOPE: CPT

## 2021-02-01 PROCEDURE — 80053 COMPREHEN METABOLIC PANEL: CPT

## 2021-02-01 PROCEDURE — 2580000003 HC RX 258: Performed by: PHYSICIAN ASSISTANT

## 2021-02-01 PROCEDURE — 83690 ASSAY OF LIPASE: CPT

## 2021-02-01 PROCEDURE — 6360000004 HC RX CONTRAST MEDICATION: Performed by: PHYSICIAN ASSISTANT

## 2021-02-01 PROCEDURE — 96365 THER/PROPH/DIAG IV INF INIT: CPT

## 2021-02-01 PROCEDURE — 85025 COMPLETE CBC W/AUTO DIFF WBC: CPT

## 2021-02-01 PROCEDURE — 71045 X-RAY EXAM CHEST 1 VIEW: CPT

## 2021-02-01 PROCEDURE — 99284 EMERGENCY DEPT VISIT MOD MDM: CPT

## 2021-02-01 PROCEDURE — 74177 CT ABD & PELVIS W/CONTRAST: CPT

## 2021-02-01 PROCEDURE — 82077 ASSAY SPEC XCP UR&BREATH IA: CPT

## 2021-02-01 PROCEDURE — 2500000003 HC RX 250 WO HCPCS: Performed by: PHYSICIAN ASSISTANT

## 2021-02-01 RX ORDER — 0.9 % SODIUM CHLORIDE 0.9 %
1000 INTRAVENOUS SOLUTION INTRAVENOUS ONCE
Status: COMPLETED | OUTPATIENT
Start: 2021-02-01 | End: 2021-02-01

## 2021-02-01 RX ORDER — SODIUM CHLORIDE 0.9 % (FLUSH) 0.9 %
10 SYRINGE (ML) INJECTION PRN
Status: DISCONTINUED | OUTPATIENT
Start: 2021-02-01 | End: 2021-02-08 | Stop reason: HOSPADM

## 2021-02-01 RX ORDER — PROMETHAZINE HYDROCHLORIDE 25 MG/1
12.5 TABLET ORAL EVERY 6 HOURS PRN
Status: DISCONTINUED | OUTPATIENT
Start: 2021-02-01 | End: 2021-02-08 | Stop reason: HOSPADM

## 2021-02-01 RX ORDER — PANTOPRAZOLE SODIUM 40 MG/10ML
40 INJECTION, POWDER, LYOPHILIZED, FOR SOLUTION INTRAVENOUS DAILY
Status: DISCONTINUED | OUTPATIENT
Start: 2021-02-02 | End: 2021-02-08 | Stop reason: HOSPADM

## 2021-02-01 RX ORDER — FENTANYL CITRATE 50 UG/ML
50 INJECTION, SOLUTION INTRAMUSCULAR; INTRAVENOUS
Status: DISCONTINUED | OUTPATIENT
Start: 2021-02-01 | End: 2021-02-02

## 2021-02-01 RX ORDER — POTASSIUM CHLORIDE 7.45 MG/ML
10 INJECTION INTRAVENOUS PRN
Status: DISCONTINUED | OUTPATIENT
Start: 2021-02-01 | End: 2021-02-08 | Stop reason: HOSPADM

## 2021-02-01 RX ORDER — ACETAMINOPHEN 325 MG/1
650 TABLET ORAL EVERY 6 HOURS PRN
Status: DISCONTINUED | OUTPATIENT
Start: 2021-02-01 | End: 2021-02-08 | Stop reason: HOSPADM

## 2021-02-01 RX ORDER — POTASSIUM CHLORIDE 20 MEQ/1
40 TABLET, EXTENDED RELEASE ORAL PRN
Status: DISCONTINUED | OUTPATIENT
Start: 2021-02-01 | End: 2021-02-08 | Stop reason: HOSPADM

## 2021-02-01 RX ORDER — ACETAMINOPHEN 650 MG/1
650 SUPPOSITORY RECTAL EVERY 6 HOURS PRN
Status: DISCONTINUED | OUTPATIENT
Start: 2021-02-01 | End: 2021-02-08 | Stop reason: HOSPADM

## 2021-02-01 RX ORDER — SENNA PLUS 8.6 MG/1
1 TABLET ORAL DAILY PRN
Status: DISCONTINUED | OUTPATIENT
Start: 2021-02-01 | End: 2021-02-08 | Stop reason: HOSPADM

## 2021-02-01 RX ORDER — ONDANSETRON 2 MG/ML
4 INJECTION INTRAMUSCULAR; INTRAVENOUS EVERY 6 HOURS PRN
Status: DISCONTINUED | OUTPATIENT
Start: 2021-02-01 | End: 2021-02-08 | Stop reason: HOSPADM

## 2021-02-01 RX ORDER — SODIUM CHLORIDE 9 MG/ML
INJECTION, SOLUTION INTRAVENOUS CONTINUOUS
Status: ACTIVE | OUTPATIENT
Start: 2021-02-01 | End: 2021-02-02

## 2021-02-01 RX ORDER — MAGNESIUM SULFATE IN WATER 40 MG/ML
2000 INJECTION, SOLUTION INTRAVENOUS PRN
Status: DISCONTINUED | OUTPATIENT
Start: 2021-02-01 | End: 2021-02-08 | Stop reason: HOSPADM

## 2021-02-01 RX ORDER — SODIUM CHLORIDE 0.9 % (FLUSH) 0.9 %
10 SYRINGE (ML) INJECTION EVERY 12 HOURS SCHEDULED
Status: DISCONTINUED | OUTPATIENT
Start: 2021-02-01 | End: 2021-02-08 | Stop reason: HOSPADM

## 2021-02-01 RX ADMIN — IOPAMIDOL 75 ML: 755 INJECTION, SOLUTION INTRAVENOUS at 17:26

## 2021-02-01 RX ADMIN — SODIUM CHLORIDE 1000 ML: 9 INJECTION, SOLUTION INTRAVENOUS at 16:51

## 2021-02-01 RX ADMIN — PIPERACILLIN SODIUM AND TAZOBACTAM SODIUM 4500 MG: 4; .5 INJECTION, POWDER, LYOPHILIZED, FOR SOLUTION INTRAVENOUS at 18:08

## 2021-02-01 RX ADMIN — FAMOTIDINE 20 MG: 10 INJECTION, SOLUTION INTRAVENOUS at 16:52

## 2021-02-01 ASSESSMENT — ENCOUNTER SYMPTOMS
DIARRHEA: 1
NAUSEA: 0
BACK PAIN: 0
COUGH: 0
BLOOD IN STOOL: 1
VOMITING: 0
ABDOMINAL PAIN: 1
EYE PAIN: 0
SHORTNESS OF BREATH: 0

## 2021-02-01 NOTE — ED PROVIDER NOTES
[facility]  ED  EMERGENCY DEPARTMENT ENCOUNTER        Pt Name: Anna Marie Souza  MRN: [id number]  Armstrongfjulia 1950  Date of evaluation: 2/1/2021  Provider: NATALIYA Hodgson  PCP: Sudhir Diamond MD     I have seen and evaluated this patient with my supervising physician Alonzo Reyes       Chief Complaint   Patient presents with    Anorexia     patient reports \"bowel problems\" for the past three months. states she hasn't been seen for it because she's had \"COVID three times\" pt reports she came to the ED today because she doesn't feel like eating. she states she doesn't eat because \"it just comes back out as water and it hurts\"        HISTORY OF PRESENT ILLNESS   (Location, Timing/Onset, Context/Setting, Quality, Duration, Modifying Factors, Severity, Associated Signs and Symptoms)  Note limiting factors. Anna Marie Souza is a 72 y.o. female who presents to the ED for evaluation of bloody diarrhea and abdominal pain. Onset 3 months ago. She says that eating food makes her abdomen cramp and \"just comes out right back out\". She is concerned she has Crohn's disease. Patient denies any pain currently, but as soon as she eats any food she has severe pain. Patient is feeling very depressed about this. She has not seen a GI specialist for this for fear of going out due to [place]. She says she's had COVID three times because she had three separate episodes of emesis last month. She's not been swabbed for COVID. She has not medicated. The patient denies fever or chills, chest pain, shortness of breath. No recent travel, exposure to sick contacts, or recent antibiotics. No other acute concerns, associated symptoms or modifying factors. Nursing Notes were all reviewed and agreed with or any disagreements were addressed in the HPI.     REVIEW OF SYSTEMS    (2-9 systems for level 4, 10 or more for level 5)     Review of Systems   Constitutional: Negative for chills, fatigue and fever. Eyes: Negative for pain. Respiratory: Negative for cough and shortness of breath. Cardiovascular: Negative for chest pain. Gastrointestinal: Positive for abdominal pain, blood in stool and diarrhea. Negative for nausea and vomiting. Genitourinary: Negative for dysuria. Musculoskeletal: Negative for back pain, neck pain and neck stiffness. Skin: Negative for rash. Neurological: Negative for dizziness and headaches. Psychiatric/Behavioral: Negative for confusion. Positives and Pertinent negatives as per HPI. Except as noted above in the ROS, all other systems were reviewed and negative. PAST MEDICAL HISTORY     Past Medical History:   Diagnosis Date    Acute hemorrhoid     Depression     Elevated blood pressure reading in office with white coat syndrome, without diagnosis of hypertension     Osteoarthritis          SURGICAL HISTORY     Past Surgical History:   Procedure Laterality Date    BREAST ENHANCEMENT SURGERY  67    silicon injected    TUBAL LIGATION           CURRENTMEDICATIONS       Previous Medications    No medications on file         ALLERGIES     Codeine and Morphine    FAMILYHISTORY       Family History   Problem Relation Age of Onset    Cancer Mother           SOCIAL HISTORY       Social History     Tobacco Use    Smoking status: Former Smoker     Packs/day: 1.00     Years: 10.00     Pack years: 10.00     Types: Cigarettes     Quit date:      Years since quittin.1    Smokeless tobacco: Never Used   Substance Use Topics    Alcohol use:  Yes    Drug use: No       SCREENINGS             PHYSICAL EXAM    (up to 7 for level 4, 8 or more for level 5)     ED Triage Vitals   BP Temp Temp Source Pulse Resp SpO2 Height Weight   21 1524 21 1523 21 1523 21 1523 21 1523 21 1523 21 1523 21 1523   125/72 98.3 °F (36.8 °C) Oral 106 17 96 % 5' 5\" (1.651 m) 130 lb (59 kg)       Physical Exam  Vitals components:    Ketones, Urine 15 (*)     All other components within normal limits    Narrative:     Performed at:  [facility]  [address],  [place], [address]   Phone [phone]   LIPASE    Narrative:     Performed at:  [facility]) [facility]  [address],  [place], [address]   Phone [phone]       All other labs were within normal range or not returned as of this dictation. EKG: All EKG's are interpreted by the Emergency Department Physician in the absence of a cardiologist.  Please see their note for interpretation of EKG. RADIOLOGY:   Non-plain film images such as CT, Ultrasound and MRI are read by the radiologist. Plain radiographic images are visualized and preliminarily interpreted by the ED Provider with the below findings:        Interpretation per the Radiologist below, if available at the time of this note:    CT ABDOMEN PELVIS W IV CONTRAST Additional Contrast? None   Final Result   Perforation of diverticulitis, colitis or malignancy involving the mid   sigmoid colon with adjacent 4.6 x 3.3 x 2.7 cm abscess. Small amount air in   the abscess cavity may either be due to gas forming infection or fistulous   connection to the sigmoid. No results found. PROCEDURES   Unless otherwise noted below, none     Procedures    CRITICAL CARE TIME   Because of high probability of sudden clinical deterioration of the patient's condition or  further deterioration, critical care time included my full attention to the patient's condition, including chart data review, documentation, medication ordering, reviewing the patient's old records, reevaluation patient's cardiac, pulmonary and neurological status. Reassessment of vital signs. Consultations with ED attending and admitting physician. Ordering, interpreting reviewing diagnostic testing.  Therefore, my critical care time was 25 minutes of direct attention to the patient's condition did not include time spent on procedures. CONSULTS:  IP CONSULT TO GENERAL SURGERY  IP CONSULT TO HOSPITALIST      EMERGENCY DEPARTMENT COURSE and DIFFERENTIAL DIAGNOSIS/MDM:   Vitals:    Vitals:    02/01/21 1524 02/01/21 1626 02/01/21 1654 02/01/21 1747   BP: 125/72  119/66 (!) 127/90   Pulse:  99 91    Resp:   20    Temp:       TempSrc:       SpO2:   98% 100%   Weight:       Height:           Patient was given the following medications:  Medications   piperacillin-tazobactam (ZOSYN) 4,500 mg in dextrose 5 % 100 mL IVPB (mini-bag) (4,500 mg Intravenous New Bag 2/1/21 1808)   0.9 % sodium chloride bolus (1,000 mLs Intravenous New Bag 2/1/21 1651)   famotidine (PEPCID) injection 20 mg (20 mg Intravenous Given 2/1/21 1652)   iopamidol (ISOVUE-370) 76 % injection 75 mL (75 mLs Intravenous Given 2/1/21 1726)           DIFFERENTIAL DIAGNOSIS: Dehydration, C. Difficile colitis, Other Bacterial Diarrheas (such as Clostridium perfringens, Salmonella, Shigella, Yersinia, Campylobacter), Viral diarrhea such as Rotavirus, [place] virus or others, Ischemic Bowel, Partial Bowel Obstruction, Primary GI bleed. Patient is afebrile, nontoxic, and hemodynamically stable. She describes a 3-month history of bloody diarrhea, severe lower abdominal pain worse with eating anything. She is never had a colonoscopy. I have reviewed the patient's laboratory results. The patient has normal WBCs, hematocrit and platelets. They have no severe electrolyte abnormality or renal impairment. Alk phos 196, ALT 54, AST 43. Lipase normal.  CT of the abdomen pelvis showed perforation of diverticulitis, colitis, or malignancy involving the mid sigmoid colon with adjacent 4.6 x 3.3 x 2.7 cm abscess. Small amount of air in the abscess cavity may either be due to gas-forming infection or fistulous connection to the sigmoid. Reviewed the above findings with the patient. Ordered Zosyn to cover for intra-abdominal infection. Globulin 3.4 g/dL   Lipase   Result Value Ref Range    Lipase 21.0 13.0 - 60.0 U/L   Urinalysis Reflex to Culture    Specimen: Urine, clean catch   Result Value Ref Range    Color, UA Yellow Straw/Yellow    Clarity, UA Clear Clear    Glucose, Ur Negative Negative mg/dL    Bilirubin Urine Negative Negative    Ketones, Urine 15 (A) Negative mg/dL    Specific Gravity, UA <=1.005 1.005 - 1.030    Blood, Urine Negative Negative    pH, UA 6.5 5.0 - 8.0    Protein, UA Negative Negative mg/dL    Urobilinogen, Urine 0.2 <2.0 E.U./dL    Nitrite, Urine Negative Negative    Leukocyte Esterase, Urine Negative Negative    Microscopic Examination Not Indicated     Urine Type NotGiven     Urine Reflex to Culture Not Indicated        I spoke with Dr. Krista Olszewski. We thoroughly discussed the history, physical exam, laboratory and imaging studies, as well as, emergency department course. Based upon that discussion, we've decided to admit [address] [facility] for further observation and evaluation of [facility] perforated sigmoid colon and adjacent abscess. As I have deemed necessary from their history, physical and studies, I have considered and evaluated [address] [facility] for the following diagnoses:  ACUTE APPENDICITIS, BOWEL OBSTRUCTION, CHOLECYSTITIS, DIVERTICULITIS, INCARCERATED HERNIA, PANCREATITIS, or PERFORATED BOWEL or ULCER. FINAL IMPRESSION      1. Perforation of sigmoid colon ([address]    2. Abscess of sigmoid colon          DISPOSITION/PLAN   DISPOSITION        PATIENT REFERREDTO:  No follow-up provider specified.     DISCHARGE MEDICATIONS:  New Prescriptions    No medications on file       DISCONTINUED MEDICATIONS:  Discontinued Medications    No medications on file              (Please note that portions of this note were completed with a voice recognition program.  Efforts were made to edit the dictations but occasionally words are mis-transcribed.)    NATALIYA Fuchs (electronically signed)           Jose Haley [place]  02/01/21 1811

## 2021-02-01 NOTE — ED NOTES
Patient identified as a positive fall risk on the ED triage fall screening. Patient placed in fall precautions which includes:  yellow fall risk bracelet on wrist, non-skid shoes on feet, \"Be Safe\" sign placed on patient's door, and bed alarm placed under patient/alarm turned on. Patient instructed on importance of not getting out of bed or ambulating without assistance for safety.           Axel Barry RN  02/01/21 0346

## 2021-02-01 NOTE — ED PROVIDER NOTES
underlying diverticulitis, colitis or even potential malignancy. Patient started on antibiotics. General surgery consulted and the patient will be admitted to the hospital    During the patient's ED course, the patient was given:  Medications   piperacillin-tazobactam (ZOSYN) 4,500 mg in dextrose 5 % 100 mL IVPB (mini-bag) (4,500 mg Intravenous New Bag 2/1/21 1808)   0.9 % sodium chloride bolus (1,000 mLs Intravenous New Bag 2/1/21 1651)   famotidine (PEPCID) injection 20 mg (20 mg Intravenous Given 2/1/21 1652)   iopamidol (ISOVUE-370) 76 % injection 75 mL (75 mLs Intravenous Given 2/1/21 1726)        CLINICAL IMPRESSION  1. Perforation of sigmoid colon ([address]    2. Abscess of sigmoid colon        DISPOSITION  Admission      This chart was created using Dragon dictation software. Efforts were made by me to ensure accuracy, however some errors may be present due to limitations of this technology.             Saunders Dance, MD  02/01/21 3485

## 2021-02-01 NOTE — ED NOTES
No orders for Blood Cultures at this time. [address] PRINCE completed 2 sets pre protocol and sent to lab prior to ABX infusing.        Nahum Banks RN  02/01/21 0681

## 2021-02-01 NOTE — ED NOTES
Ps general surgery consult @1801  Re: 3 months of bloody diarrhea.  CT shows perforation of diverticulitis/colitis or malignancy in mid sigmoid colon with adjacent abscess per [email or web address] returned Henrik Bob  02/01/21 3260

## 2021-02-02 ENCOUNTER — APPOINTMENT (OUTPATIENT)
Dept: CT IMAGING | Age: 71
DRG: 377 | End: 2021-02-02
Payer: MEDICARE

## 2021-02-02 LAB
A/G RATIO: 1.1 (ref 1.1–2.2)
ABO/RH: NORMAL
ALBUMIN SERPL-MCNC: 3.2 G/DL (ref 3.4–5)
ALP BLD-CCNC: 148 U/L (ref 40–129)
ALT SERPL-CCNC: 37 U/L (ref 10–40)
ANION GAP SERPL CALCULATED.3IONS-SCNC: 10 MMOL/L (ref 3–16)
ANTIBODY SCREEN: NORMAL
AST SERPL-CCNC: 23 U/L (ref 15–37)
BASOPHILS ABSOLUTE: 0 K/UL (ref 0–0.2)
BASOPHILS RELATIVE PERCENT: 0.4 %
BILIRUB SERPL-MCNC: 0.6 MG/DL (ref 0–1)
BUN BLDV-MCNC: 8 MG/DL (ref 7–20)
CALCIUM SERPL-MCNC: 8.9 MG/DL (ref 8.3–10.6)
CHLORIDE BLD-SCNC: 104 MMOL/L (ref 99–110)
CO2: 25 MMOL/L (ref 21–32)
CREAT SERPL-MCNC: <0.5 MG/DL (ref 0.6–1.2)
EOSINOPHILS ABSOLUTE: 0 K/UL (ref 0–0.6)
EOSINOPHILS RELATIVE PERCENT: 0.4 %
ETHANOL: NORMAL MG/DL (ref 0–0.08)
GFR AFRICAN AMERICAN: >60
GFR NON-AFRICAN AMERICAN: >60
GLOBULIN: 2.9 G/DL
GLUCOSE BLD-MCNC: 94 MG/DL (ref 70–99)
HCT VFR BLD CALC: 36 % (ref 36–48)
HCT VFR BLD CALC: 39.4 % (ref 36–48)
HEMOGLOBIN: 12 G/DL (ref 12–16)
HEMOGLOBIN: 13.1 G/DL (ref 12–16)
INR BLD: 1.35 (ref 0.86–1.14)
LYMPHOCYTES ABSOLUTE: 0.9 K/UL (ref 1–5.1)
LYMPHOCYTES RELATIVE PERCENT: 15.2 %
MCH RBC QN AUTO: 29.4 PG (ref 26–34)
MCHC RBC AUTO-ENTMCNC: 33.4 G/DL (ref 31–36)
MCV RBC AUTO: 88.1 FL (ref 80–100)
MONOCYTES ABSOLUTE: 0.3 K/UL (ref 0–1.3)
MONOCYTES RELATIVE PERCENT: 4.5 %
NEUTROPHILS ABSOLUTE: 4.9 K/UL (ref 1.7–7.7)
NEUTROPHILS RELATIVE PERCENT: 79.5 %
PDW BLD-RTO: 14.1 % (ref 12.4–15.4)
PLATELET # BLD: 196 K/UL (ref 135–450)
PMV BLD AUTO: 7.3 FL (ref 5–10.5)
POTASSIUM REFLEX MAGNESIUM: 3.6 MMOL/L (ref 3.5–5.1)
PREALBUMIN: 11.7 MG/DL (ref 20–40)
PROTHROMBIN TIME: 15.7 SEC (ref 10–13.2)
RBC # BLD: 4.09 M/UL (ref 4–5.2)
SODIUM BLD-SCNC: 139 MMOL/L (ref 136–145)
TOTAL PROTEIN: 6.1 G/DL (ref 6.4–8.2)
WBC # BLD: 6.2 K/UL (ref 4–11)

## 2021-02-02 PROCEDURE — 2709999900 CT PERITONEAL/RETROPERITONEAL PERC DRAIN

## 2021-02-02 PROCEDURE — 85610 PROTHROMBIN TIME: CPT

## 2021-02-02 PROCEDURE — 6360000002 HC RX W HCPCS: Performed by: HOSPITALIST

## 2021-02-02 PROCEDURE — 84134 ASSAY OF PREALBUMIN: CPT

## 2021-02-02 PROCEDURE — 6370000000 HC RX 637 (ALT 250 FOR IP): Performed by: HOSPITALIST

## 2021-02-02 PROCEDURE — 87070 CULTURE OTHR SPECIMN AEROBIC: CPT

## 2021-02-02 PROCEDURE — 85018 HEMOGLOBIN: CPT

## 2021-02-02 PROCEDURE — 86900 BLOOD TYPING SEROLOGIC ABO: CPT

## 2021-02-02 PROCEDURE — 0W9G30Z DRAINAGE OF PERITONEAL CAVITY WITH DRAINAGE DEVICE, PERCUTANEOUS APPROACH: ICD-10-PCS | Performed by: RADIOLOGY

## 2021-02-02 PROCEDURE — 86850 RBC ANTIBODY SCREEN: CPT

## 2021-02-02 PROCEDURE — 99231 SBSQ HOSP IP/OBS SF/LOW 25: CPT | Performed by: INTERNAL MEDICINE

## 2021-02-02 PROCEDURE — 2700000000 HC OXYGEN THERAPY PER DAY

## 2021-02-02 PROCEDURE — 82077 ASSAY SPEC XCP UR&BREATH IA: CPT

## 2021-02-02 PROCEDURE — 6360000002 HC RX W HCPCS: Performed by: RADIOLOGY

## 2021-02-02 PROCEDURE — 86901 BLOOD TYPING SEROLOGIC RH(D): CPT

## 2021-02-02 PROCEDURE — C9113 INJ PANTOPRAZOLE SODIUM, VIA: HCPCS | Performed by: HOSPITALIST

## 2021-02-02 PROCEDURE — 87205 SMEAR GRAM STAIN: CPT

## 2021-02-02 PROCEDURE — APPNB60 APP NON BILLABLE TIME 46-60 MINS: Performed by: CLINICAL NURSE SPECIALIST

## 2021-02-02 PROCEDURE — 99222 1ST HOSP IP/OBS MODERATE 55: CPT | Performed by: SURGERY

## 2021-02-02 PROCEDURE — 80053 COMPREHEN METABOLIC PANEL: CPT

## 2021-02-02 PROCEDURE — 87077 CULTURE AEROBIC IDENTIFY: CPT

## 2021-02-02 PROCEDURE — 2580000003 HC RX 258: Performed by: INTERNAL MEDICINE

## 2021-02-02 PROCEDURE — 36415 COLL VENOUS BLD VENIPUNCTURE: CPT

## 2021-02-02 PROCEDURE — 94761 N-INVAS EAR/PLS OXIMETRY MLT: CPT

## 2021-02-02 PROCEDURE — 99221 1ST HOSP IP/OBS SF/LOW 40: CPT | Performed by: INTERNAL MEDICINE

## 2021-02-02 PROCEDURE — 85014 HEMATOCRIT: CPT

## 2021-02-02 PROCEDURE — 85025 COMPLETE CBC W/AUTO DIFF WBC: CPT

## 2021-02-02 PROCEDURE — 1200000000 HC SEMI PRIVATE

## 2021-02-02 PROCEDURE — 6360000002 HC RX W HCPCS: Performed by: INTERNAL MEDICINE

## 2021-02-02 PROCEDURE — 6370000000 HC RX 637 (ALT 250 FOR IP): Performed by: INTERNAL MEDICINE

## 2021-02-02 PROCEDURE — 2580000003 HC RX 258: Performed by: HOSPITALIST

## 2021-02-02 RX ORDER — FENTANYL CITRATE 50 UG/ML
INJECTION, SOLUTION INTRAMUSCULAR; INTRAVENOUS
Status: COMPLETED | OUTPATIENT
Start: 2021-02-02 | End: 2021-02-02

## 2021-02-02 RX ORDER — MIDAZOLAM HYDROCHLORIDE 5 MG/ML
INJECTION INTRAMUSCULAR; INTRAVENOUS
Status: COMPLETED | OUTPATIENT
Start: 2021-02-02 | End: 2021-02-02

## 2021-02-02 RX ORDER — SODIUM CHLORIDE 9 MG/ML
INJECTION, SOLUTION INTRAVENOUS CONTINUOUS
Status: DISCONTINUED | OUTPATIENT
Start: 2021-02-02 | End: 2021-02-05

## 2021-02-02 RX ORDER — FLUCONAZOLE 100 MG/1
400 TABLET ORAL ONCE
Status: COMPLETED | OUTPATIENT
Start: 2021-02-02 | End: 2021-02-02

## 2021-02-02 RX ORDER — FLUCONAZOLE 100 MG/1
200 TABLET ORAL DAILY
Status: DISCONTINUED | OUTPATIENT
Start: 2021-02-03 | End: 2021-02-05

## 2021-02-02 RX ORDER — KETOROLAC TROMETHAMINE 30 MG/ML
15 INJECTION, SOLUTION INTRAMUSCULAR; INTRAVENOUS EVERY 6 HOURS PRN
Status: DISPENSED | OUTPATIENT
Start: 2021-02-02 | End: 2021-02-07

## 2021-02-02 RX ADMIN — PROMETHAZINE HYDROCHLORIDE 12.5 MG: 25 TABLET ORAL at 11:07

## 2021-02-02 RX ADMIN — PIPERACILLIN AND TAZOBACTAM 3375 MG: 3; .375 INJECTION, POWDER, FOR SOLUTION INTRAVENOUS at 09:02

## 2021-02-02 RX ADMIN — ONDANSETRON 4 MG: 2 INJECTION INTRAMUSCULAR; INTRAVENOUS at 09:45

## 2021-02-02 RX ADMIN — MIDAZOLAM HYDROCHLORIDE 1 MG: 5 INJECTION, SOLUTION INTRAMUSCULAR; INTRAVENOUS at 12:04

## 2021-02-02 RX ADMIN — SODIUM CHLORIDE: 9 INJECTION, SOLUTION INTRAVENOUS at 03:26

## 2021-02-02 RX ADMIN — SODIUM CHLORIDE: 9 INJECTION, SOLUTION INTRAVENOUS at 21:31

## 2021-02-02 RX ADMIN — Medication 10 ML: at 09:46

## 2021-02-02 RX ADMIN — KETOROLAC TROMETHAMINE 15 MG: 30 INJECTION, SOLUTION INTRAMUSCULAR at 14:30

## 2021-02-02 RX ADMIN — PIPERACILLIN AND TAZOBACTAM 3375 MG: 3; .375 INJECTION, POWDER, FOR SOLUTION INTRAVENOUS at 16:27

## 2021-02-02 RX ADMIN — Medication 10 ML: at 03:45

## 2021-02-02 RX ADMIN — Medication 10 ML: at 21:32

## 2021-02-02 RX ADMIN — FENTANYL CITRATE 25 MCG: 50 INJECTION INTRAMUSCULAR; INTRAVENOUS at 12:18

## 2021-02-02 RX ADMIN — FLUCONAZOLE 400 MG: 100 TABLET ORAL at 21:31

## 2021-02-02 RX ADMIN — PANTOPRAZOLE SODIUM 40 MG: 40 INJECTION, POWDER, FOR SOLUTION INTRAVENOUS at 09:02

## 2021-02-02 RX ADMIN — PIPERACILLIN AND TAZOBACTAM 3375 MG: 3; .375 INJECTION, POWDER, FOR SOLUTION INTRAVENOUS at 03:45

## 2021-02-02 RX ADMIN — KETOROLAC TROMETHAMINE 15 MG: 30 INJECTION, SOLUTION INTRAMUSCULAR at 21:31

## 2021-02-02 ASSESSMENT — PAIN SCALES - GENERAL
PAINLEVEL_OUTOF10: 6
PAINLEVEL_OUTOF10: 0
PAINLEVEL_OUTOF10: 3

## 2021-02-02 ASSESSMENT — PAIN DESCRIPTION - LOCATION: LOCATION: PELVIS

## 2021-02-02 NOTE — ED NOTES
Pt resting on cot in position of comfort. Respirations regular. Airway intact. GCS 15. Pt holding conversation with this RN w/o difficulty.  0 s/s of distress. Awaiting further orders. Will continue to monitor.        Letitia Thomas RN  02/01/21 8016

## 2021-02-02 NOTE — PROGRESS NOTES
Consult called to [facility] 2/2/2021  @08:21AM PRINCE aware Victorino Elizondo

## 2021-02-02 NOTE — PROGRESS NOTES
Hospitalist Progress Note      PCP: Juliana Moreland MD    Date of Admission: 2/1/2021    Chief Complaint: Anorexia, abdominal pain, weight loss    Hospital Course: H&P reviewed. Patient admitted with suspected bowel perforation with pelvic abscess, intermittent GI bleed    Subjective:     Patient reports severe abdominal pain with no nausea vomiting. Not improved with Tylenol. Has been refusing IV fentanyl and reports allergy to morphine. Medications:  Reviewed    Infusion Medications   Scheduled Medications    sodium chloride flush  10 mL Intravenous 2 times per day    pantoprazole  40 mg Intravenous Daily    piperacillin-tazobactam  3,375 mg Intravenous Q8H     PRN Meds: sodium chloride flush, potassium chloride **OR** potassium alternative oral replacement **OR** potassium chloride, magnesium sulfate, promethazine **OR** ondansetron, senna, acetaminophen **OR** acetaminophen, fentanNYL      Intake/Output Summary (Last 24 hours) at 2/2/2021 1055  Last data filed at 2/2/2021 0504  Gross per 24 hour   Intake 10 ml   Output 275 ml   Net -265 ml       Physical Exam Performed:    /75   Pulse 80   Temp 97.5 °F (36.4 °C) (Oral)   Resp 16   Ht 5' 5\" (1.651 m)   Wt 126 lb 8.7 oz (57.4 kg)   SpO2 97%   BMI 21.06 kg/m²     General appearance: No apparent distress, appears stated age and cooperative. HEENT: Pupils equal, round,  Conjunctivae/corneas clear. Neck: Supple, with full range of motion. No jugular venous distention. Trachea midline. Respiratory:  Normal respiratory effort. Clear to auscultation, bilaterally without Rales/Wheezes/Rhonchi. Cardiovascular: Regular rate and rhythm with normal S1/S2 without murmurs, rubs or gallops. Abdomen: Soft, generalized tenderness with no rebound or guarding, non-distended with + bowel sounds. Musculoskeletal: No clubbing, cyanosis or edema bilaterally.     Skin: Warm and dry  Neurologic: Alert speech clear with no overt facial droop  Psychiatric: Alert and oriented, thought content appropriate, normal insight        Labs:   Recent Labs     02/01/21  1639 02/02/21  0617   WBC 9.8 6.2   HGB 13.2 12.0   HCT 39.4 36.0    196     Recent Labs     02/01/21  1639 02/02/21  0617    139   K 3.9 3.6   CL 98* 104   CO2 25 25   BUN 10 8   CREATININE <0.5* <0.5*   CALCIUM 9.7 8.9     Recent Labs     02/01/21  1639 02/02/21  0617   AST 43* 23   ALT 54* 37   BILITOT 1.0 0.6   ALKPHOS 196* 148*     Recent Labs     02/02/21  0944   INR 1.35*     No results for input(s): Towana Chidi in the last 72 hours. Urinalysis:      Lab Results   Component Value Date    NITRU Negative 02/01/2021    WBCUA 6-10 07/21/2018    BACTERIA 1+ 07/19/2018    RBCUA 20-50 07/21/2018    BLOODU Negative 02/01/2021    SPECGRAV <=1.005 02/01/2021    GLUCOSEU Negative 02/01/2021    GLUCOSEU Neg 02/03/2010       Radiology:  XR CHEST PORTABLE   Final Result   No acute process in the chest by radiograph. CT ABDOMEN PELVIS W IV CONTRAST Additional Contrast? None   Final Result   Perforation of diverticulitis, colitis or malignancy involving the mid   sigmoid colon with adjacent 4.6 x 3.3 x 2.7 cm abscess. Small amount air in   the abscess cavity may either be due to gas forming infection or fistulous   connection to the sigmoid. CT PERITONEAL/RETROPERITONEAL PERC DRAIN    (Results Pending)           Assessment/Plan:    Active Hospital Problems    Diagnosis    Perforated sigmoid colon ([facility] [address] [K63.1]    Melanotic stools [K92.1]    Unintentional weight loss [R63.4]    Diverticulosis [K57.90]    Transaminitis [R74.01]    Mixed hyperlipidemia [E78.2]     Bowel perforation with intra-abdominal abscess: noted on CT. Gen surg consulted. Plans for percutaneous drainage of abscess by IR. Continue empiric abx with zosyn. Consult ID for assistance. Follow cultures.  IV toradol added for pain control         Intermittent melena at home : no overt signs of GIB here.GI consulted. Not anemic  hgb  Stable. Monitor         Hyperlipidemia: statin hled due to elevated LFTs.          GERD- continue PPI      Transminitis: mild,  statin held. LFT improving.        DVT Prophylaxis: SCDs       Diet: Diet NPO Effective Now  Code Status: Full Code    PT/OT Eval Status: not indicated at this time     Dispo - hard to say, pending clinical course     Chon Dumont MD

## 2021-02-02 NOTE — ED NOTES
Pt resting on cot in position of comfort. Pt covered in blanket from home, winter coat, towel over head. Pt has been offered warm blankets multiple time. Pt continues to refuse.        Zulay Barrera RN  02/02/21 0040

## 2021-02-02 NOTE — H&P
HOSPITALISTS HISTORY AND PHYSICAL    2/1/2021 10:05 PM    Patient Information:  Lauri Esparza is a 72 y.o. female [id number]  PCP:  Tenzin Sevilla MD (Tel: [phone] )    Chief complaint:    Chief Complaint   Patient presents with    Anorexia     patient reports \"bowel problems\" for the past three months. states she hasn't been seen for it because she's had \"COVID three times\" pt reports she came to the ED today because she doesn't feel like eating. she states she doesn't eat because \"it just comes back out as water and it hurts\"         History of Present Illness:  Jay Brasher is a 72 y.o. female who presented to the ED complaining of anorexia, unexpected weight loss, abdominal pain, and melanotic stools ongoing for greater than 3 months duration. She reports that she failed to contact her PCP or seek care prior to today due to concerns of catching Covid 19. Upon further questioning the patient also endorses change in her caliber of stool and reports that she has never undergone screening colonoscopy. Patient states that she has felt generally unwell for nearly a year and self diagnosed herself with recurrent Covid infection without ever being tested. Upon arrival to the ED today, labs were obtained and notable for transaminitis and alk phos elevation. CT of the abdomen and pelvis revealed perforation of mid sigmoid colon with adjacent abscess. Possible etiologies include diverticular rupture, colitis, or underlying malignancy. Patient was initiated on IV Zosyn in ED prior to collection of blood cultures. Surgery consulted by midlevel provider and agrees to see patient in a.m.       History obtained from patient and review of epic chart        REVIEW OF SYSTEMS:   Constitutional: Negative for fever,chills or night sweats; positive weight loss and fatigue  ENT: Negative for rhinorrhea, epistaxis, hoarseness, sore throat. Respiratory: Positive for shortness of breath, no wheezing  Cardiovascular: Negative for chest pain, palpitations   Gastrointestinal: Positive for anorexia, nausea, vomiting, and melanotic stools  Genitourinary: Negative for polyuria, dysuria   Hematologic/Lymphatic: Negative for bleeding tendency, easy bruising  Musculoskeletal: Positive for myalgias and arthralgias  Neurologic: Negative for confusion,dysarthria. Skin: Negative for itching,rash  Psychiatric: Positive for depression and anxiety  Endocrine: Negative for polydipsia,polyuria,heat /cold intolerance. Past Medical History:   has a past medical history of Acute hemorrhoid, Depression, Elevated blood pressure reading in office with white coat syndrome, without diagnosis of hypertension, and Osteoarthritis. Past Surgical History:   has a past surgical history that includes Tubal ligation and Breast enhancement surgery (1968). Medications:  No current facility-administered medications on file prior to encounter. No current outpatient medications on file prior to encounter. Allergies: Allergies   Allergen Reactions    Codeine Other (See Comments)     Ears ringing    Morphine Other (See Comments)     hallucinations        Social History:  Patient Lives  reports that she quit smoking about 45 years ago. Her smoking use included cigarettes. She has a 10.00 pack-year smoking history. She has never used smokeless tobacco. She reports current alcohol use. She reports that she does not use drugs. Family History:  family history includes Cancer in her mother.;  No family history of colon cancer    Physical Exam:  BP (!) 148/66   Pulse 90   Temp 98.3 °F (36.8 °C) (Oral)   Resp 20   Ht 5' 5\" (1.651 m)   Wt 130 lb (59 kg)   SpO2 99%   BMI 21.63 kg/m²     General appearance:  Appears acutely ill and anxious  Eyes: Sclera clear, pupils equal  ENT: Dry mucus membranes, no thrush.  Trachea midline. Cardiovascular: Tachycardic rhythm, normal S1, S2. No murmur, gallop, rub. No edema in lower extremities  Respiratory: Clear to auscultation bilaterally, no wheeze, good inspiratory effort  Gastrointestinal: Abdomen with epigastric and periumbilical tenderness; positive guarding and rebound; diminished bowel sounds  Musculoskeletal: No cyanosis in digits, neck supple  Neurology: Cranial nerves grossly intact. Alert and oriented in time, place and person. No speech or motor deficits  Psychiatry: Depressed and extremely anxious; terrified of requiring narcotic pain medication due to concerns for addiction  Skin: Warm, dry, normal turgor, no rash  Brisk capillary refill, peripheral pulses palpable   Labs:  CBC:   Lab Results   Component Value Date    WBC 9.8 02/01/2021    RBC 4.52 02/01/2021    HGB 13.2 02/01/2021    HCT 39.4 02/01/2021    MCV 87.3 02/01/2021    MCH 29.3 02/01/2021    MCHC 33.6 02/01/2021    RDW 14.3 02/01/2021     02/01/2021    MPV 7.5 02/01/2021     BMP:    Lab Results   Component Value Date     02/01/2021    K 3.9 02/01/2021    CL 98 02/01/2021    CO2 25 02/01/2021    BUN 10 02/01/2021    CREATININE <0.5 02/01/2021    CALCIUM 9.7 02/01/2021    GFRAA >60 02/01/2021    LABGLOM >60 02/01/2021    GLUCOSE 132 02/01/2021     CT ABDOMEN PELVIS W IV CONTRAST Additional Contrast? None   Final Result   Perforation of diverticulitis, colitis or malignancy involving the mid   sigmoid colon with adjacent 4.6 x 3.3 x 2.7 cm abscess. Small amount air in   the abscess cavity may either be due to gas forming infection or fistulous   connection to the sigmoid.              EKG: Ordered by admitting team with results not available at time of dictation      Discussed case  with ED provider    Problem List  Principal Problem:    Perforated sigmoid colon ([facility] [address]  Active Problems:    Melanotic stools    Unintentional weight loss    Diverticulosis    Mixed hyperlipidemia    Transaminitis  Resolved Problems:    * No resolved hospital problems. *        Assessment/Plan:     Bowel perforation with intra-abdominal abscess  -Stat blood culture x2 ordered by admitting team following initial dose of Zosyn in ED  -Continue Zosyn with pharmacy consulted for dosing recommendations  -Patient n.p.o. overnight with surgical consultation placed  -Patient intolerant of morphine and Percocet; extremely resistant to narcotic medications due to concerns of addiction; low-dose fentanyl ordered every 3 hours as needed intolerable pain  -CEA tumor marker added to blood work    Melanotic stool  -Patient not anemic upon arrival however appears hemoconcentrated  -Serial H&H every 6 hours  -Type and screen  -GI consultation placed    Hyperlipidemia  -Continue statin with lactic acid levels pending  -FLP ordered    GERD-IV Protonix initiated        DVT prophylaxis-BLE SCD rather than chemical prophylaxis due to reports of melanotic stool  Code status-full code  Diet-n.p.o. overnight  IV access-PIV established in ED      Admit as inpatient. I anticipate hospitalization spanning more than two midnights for investigation and treatment of the above medically necessary diagnoses. Please note that some part of this chart was generated using Dragon dictation software. Although every effort was made to ensure the accuracy of this automated transcription, some errors in transcription may have occurred inadvertently. If you may need any clarification, please do not hesitate to contact me through [facility].        Shante Lopez MD    2/1/2021 10:05 PM

## 2021-02-02 NOTE — PROCEDURES
Radiology Procedure Note    Patient Name: Alvin Cardoso  Date of Birth 1950  MRN: [id number]    Procedure:CT guided pelvic abscess drain    Pre-Procedure Diagnosis: pelvic abscess    Post- Procedure Diagnosis: Same    Findings: The procedure was performed in the usual fashion, detailed in the full report provided separately. There were no immediate post-procedure complications.     Complications: None    Estimated Blood Loss: less than 50     Specimens: Was Obtained: 12cc pus      Electronically signed by Judie Johnson MD on 2/2/2021 at 12:50 PM

## 2021-02-02 NOTE — CONSULTS
[address] [facility],  [facility] [address]  [place], [address] [facility], operated by [facility]  Phone: Buster She is a  [5] White [1] 72 y.o. Jayshree Enmanuel female      Main Problems/Chief Complaint     Perforated sigmoid colon w/ abscess    Clinical Summary      The patient is admitted with lower abd pain, anorexia and wt loss. She has had blood in the stool on and off. PAST MEDICAL HISTORY     Past Medical History:   Diagnosis Date    Acute hemorrhoid     Depression     Elevated blood pressure reading in office with white coat syndrome, without diagnosis of hypertension     Osteoarthritis      FAMILY HISTORY     Family History   Problem Relation Age of Onset    Cancer Mother        SURGICAL HISTORY     Past Surgical History:   Procedure Laterality Date    BREAST ENHANCEMENT SURGERY  3812    silicon injected    TUBAL LIGATION       CURRENT MEDICATIONS   (This list may include medications prescribed during this encounter as epic can not insert only the list prior to this encounter.)      ALLERGIES     Allergies   Allergen Reactions    Codeine Other (See Comments)     Ears ringing    Morphine Other (See Comments)     hallucinations       Due to the current efforts to prevent transmission of COVID-19 and also the need to preserve PPE for other caregivers, a face-to-face encounter with the patient was not performed. That being said, all relevant records and diagnostic tests were reviewed, including laboratory results and imaging. Please reference any relevant documentation elsewhere. Care will be coordinated with the primary service. FINAL IMPRESSION AND RECOMMENDATIONS     In the setting of perforation of the sigmoid colon, no endoscopy will be planned in near future. I have discussed this with the patient's nurse. In this case, it is best to wait for 6 or more weeks before attempting even a sigmoidoscopy.     The total time spent face-to-face and on the phillips during this

## 2021-02-02 NOTE — SEDATION DOCUMENTATION
CT guided pelvic abscess drain placed, versed 1mg and fentanyl 25 mcg given IVP as ordered, pt tolerated procedure well, 12ml of red, milky fluid removed, RUPAL drain in place, dry/sterile dressing placed, report called to April RN, pt transported back to room in stable condition.

## 2021-02-02 NOTE — PROGRESS NOTES
Pt returned to room from pelvic abscess drain placement, pt is lethargic, responds easily to verbal stimuli, VSS on room air. Drsg to pelvic area c/d/i, drain to bulb suction, scant red/brown fluid noted in tubing. Pt denies needs at this time. Call light within reach.

## 2021-02-02 NOTE — CONSULTS
Department of General Surgery Consult    PATIENT NAME: Abhilash Ram   YOB: 1950    ADMISSION DATE: 2/1/2021  4:12 PM      TODAY'S DATE: 2/2/2021    Reason for Consult:  Perforated sigmoid colon w/ abscess    Chief Complaint: Lower abdominal pain    Requesting Physician:  Taiwo Valentine    HISTORY OF PRESENT ILLNESS:              The patient is a 72 y.o. female who presents with complaints of gradually increasing lower abdominal pain, anorexia, irregular bowel habits with occasional bloody stools, and weight loss. Patient has been adjusting her diet over these past few months due to her changes in bowel habits. No prior h/o similar symptoms prior to a few months ago when this began. Pt notes she has never had a colonoscopy, denies family h/o colorectal cancer. Seen in ED last night, noted abnormal sigmoid colon (inflamed, diverticulitis vs perforated tumor?).     Past Medical History:        Diagnosis Date    Acute hemorrhoid     Depression     Elevated blood pressure reading in office with white coat syndrome, without diagnosis of hypertension     Osteoarthritis        Past Surgical History:        Procedure Laterality Date    BREAST ENHANCEMENT SURGERY  1881    silicon injected    TUBAL LIGATION         Current Medications:   Current Facility-Administered Medications: sodium chloride flush 0.9 % injection 10 mL, 10 mL, Intravenous, 2 times per day  sodium chloride flush 0.9 % injection 10 mL, 10 mL, Intravenous, PRN  potassium chloride (KLOR-CON M) extended release tablet 40 mEq, 40 mEq, Oral, PRN **OR** potassium bicarb-citric acid (EFFER-K) effervescent tablet 40 mEq, 40 mEq, Oral, PRN **OR** potassium chloride 10 mEq/100 mL IVPB (Peripheral Line), 10 mEq, Intravenous, PRN  magnesium sulfate 2000 mg in 50 mL IVPB premix, 2,000 mg, Intravenous, PRN  promethazine (PHENERGAN) tablet 12.5 mg, 12.5 mg, Oral, Q6H PRN **OR** ondansetron (ZOFRAN) injection 4 mg, 4 mg, Intravenous, Q6H non-distended, tenderness noted in the left lower quadrant, involuntary guarding absent, no masses palpated and hernia absent  MUSCULOSKELETAL: No clubbing or cyanosis, 0+ pitting edema lower extremities  NEUROLOGIC:  Mental Status Exam:  Level of Alertness:   awake  PSYCHIATRIC:   person, place, time  SKIN:  normal skin color, texture, turgor    DATA:    CBC:   Recent Labs     02/01/21  1639 02/02/21  0617   WBC 9.8 6.2   HGB 13.2 12.0   HCT 39.4 36.0    196     BMP:    Recent Labs     02/01/21  1639 02/02/21  0617    139   K 3.9 3.6   CL 98* 104   CO2 25 25   BUN 10 8   CREATININE <0.5* <0.5*   GLUCOSE 132* 94     Hepatic:   Recent Labs     02/01/21  1639 02/02/21  0617   AST 43* 23   ALT 54* 37   BILITOT 1.0 0.6   ALKPHOS 196* 148*     Mag:    No results for input(s): MG in the last 72 hours. Phos:   No results for input(s): PHOS in the last 72 hours. INR:   Recent Labs     02/02/21  0944   INR 1.35*       Radiology Review: Images personally reviewed by me. CT OF THE ABDOMEN AND PELVIS WITH CONTRAST 2/1/2021 5:15 pm       TECHNIQUE:   CT of the abdomen and pelvis was performed with the administration of   intravenous contrast. Multiplanar reformatted images are provided for review. Dose modulation, iterative reconstruction, and/or weight based adjustment of   the mA/kV was utilized to reduce the radiation dose to as low as reasonably   achievable.       COMPARISON:   December 8, 2018       HISTORY:   ORDERING SYSTEM PROVIDED HISTORY: lower abd pain, chronic diarrhea and   decreased appetite x 3 months   TECHNOLOGIST PROVIDED HISTORY:   If patient is on cardiac monitor and/or pulse ox, they may be taken off   cardiac monitor and pulse ox, left on O2 if currently on. All monitors   reattached when patient returns to room.    Additional Contrast?->None   Reason for exam:->lower abd pain, chronic diarrhea and decreased appetite x 3   months   Reason for Exam: lower abd pain, chronic diarrhea and decreased appetite x 3   months   Acuity: Acute   Type of Exam: Initial   Relevant Medical/Surgical History: tubal ligation       FINDINGS:   Lower Chest: No evidence of pneumonia or other acute findings.       Organs: No acute abnormality of the organs of the abdomen. No evidence of   pancreatitis.  No ureteral stone or hydronephrosis. No evidence of   pyelonephritis. There is hepatic steatosis.  No evidence of cholecystitis. Suggestion of faint cholelithiasis. Leila Layne is a large cyst of the left kidney   again noted measuring 7.8 cm, no significant change.       GI/Bowel: There is diffuse wall thickening of the mid sigmoid colon.  There   is a rim enhancing mixed fluid and air collection.  This measures 4.6 x 3.3   by 2.7 cm.  Adjacent mild the prominent lymph nodes and stranding of fat. There is impression upon the bladder from this process.       Pelvis: No acute abnormality of the pelvis. No evidence of cystitis.       Peritoneum/Retroperitoneum: No free air, ascites or abscess.       Bones/Soft Tissues: No fracture or other acute osseous process. Nodularity   and calcifications of the visualized breast parenchyma and adjacent chest   wall again noted, no definitive significant change.           Impression   Perforation of diverticulitis, colitis or malignancy involving the mid   sigmoid colon with adjacent 4.6 x 3.3 x 2.7 cm abscess.  Small amount air in   the abscess cavity may either be due to gas forming infection or fistulous   connection to the sigmoid. IMPRESSION/RECOMMENDATIONS:    Perforated sigmoid of unclear etiology, with associated pelvic abscess   - short-term goal should be control of the extracolonic infectious process (ie abscess)   - will order perc drainage of abscess w/ IR   - ideally will resolve the acute process with antibiotics, bowel rest, abscess drainage.   Would then hope to get interval colonoscopy in 4-6 weeks to clarify etiology of process (diverticulitis vs colon)    Will follow    Electronically signed by Rasta Piedra MD     [address]

## 2021-02-02 NOTE — PROGRESS NOTES
Pt admitted to [facility]. Vital signs taken and assessment completed. Bed locked and in lowest position. Bed alarm on. Patient oriented to room and call light.

## 2021-02-02 NOTE — PROGRESS NOTES
Call placed to surgery requesting ok to remove bowling per ID physician request, ok'ed; will remove bowling when pt is more alert and awake. Pt sedated status post perc drain placement. NPO orders updated to allow ice chips, popsicles. Pt resting in bed with both eyes closed.

## 2021-02-02 NOTE — CONSULTS
Infectious Diseases   Consult Note      Reason for Consult:  Intra-abd abscess    Requesting Physician:  Dr. Alex Corea       Date of Admission: 2/1/2021  Subjective:   CHIEF COMPLAINT:  None given       HPI:   Sonu Giles is a 73yoF with history of hemorrhoids, depression, BTL     She was admitted through the ED 2/1/21 with several month history of L sided abd pain, tenesmus, altered bowels with some hematochezia, associated with weight loss. WBC was wnl on admission. LFTs were mildly elevated. CT revealed diffuse wall thickening of the mid sigmoid with adjacent 4.6x3. 3x2.7cm abscess. Air in the abscess suggesting possible fistulous connection to the sigmoid. IR drainage of the abscess completed today 2/1/21 with evacuation of dark purulent fluid. Cultures are pending. She is without fever, leukocytosis. Cristobal in place. Current abx:  Zosyn 3.375 q8        Past Surgical History:       Diagnosis Date    Acute hemorrhoid     Depression     Elevated blood pressure reading in office with white coat syndrome, without diagnosis of hypertension     Osteoarthritis          Procedure Laterality Date    BREAST ENHANCEMENT SURGERY  8066    silicon injected    CT RETROPERITONEAL PERC DRAIN  2/2/2021    CT RETROPERITONEAL PERC DRAIN 2/2/2021 Chino Campos MD [facility] CT SCAN    TUBAL LIGATION         Social History:    TOBACCO:   reports that she quit smoking about 45 years ago. Her smoking use included cigarettes. She has a 10.00 pack-year smoking history. She has never used smokeless tobacco.  ETOH:   reports current alcohol use. There is no history of illicit drug use or other significant epidemiologic exposures.       Family History:       Problem Relation Age of Onset    Cancer Mother        Current Medications:    Current Facility-Administered Medications: ketorolac (TORADOL) injection 15 mg, 15 mg, Intravenous, Q6H PRN  sodium chloride flush 0.9 % injection 10 mL, 10 mL, Intravenous, 2 times per day  sodium chloride flush 0.9 % injection 10 mL, 10 mL, Intravenous, PRN  potassium chloride (KLOR-CON M) extended release tablet 40 mEq, 40 mEq, Oral, PRN **OR** potassium bicarb-citric acid (EFFER-K) effervescent tablet 40 mEq, 40 mEq, Oral, PRN **OR** potassium chloride 10 mEq/100 mL IVPB (Peripheral Line), 10 mEq, Intravenous, PRN  magnesium sulfate 2000 mg in 50 mL IVPB premix, 2,000 mg, Intravenous, PRN  promethazine (PHENERGAN) tablet 12.5 mg, 12.5 mg, Oral, Q6H PRN **OR** ondansetron (ZOFRAN) injection 4 mg, 4 mg, Intravenous, Q6H PRN  senna (SENOKOT) tablet 8.6 mg, 1 tablet, Oral, Daily PRN  acetaminophen (TYLENOL) tablet 650 mg, 650 mg, Oral, Q6H PRN **OR** acetaminophen (TYLENOL) suppository 650 mg, 650 mg, Rectal, Q6H PRN  pantoprazole (PROTONIX) injection 40 mg, 40 mg, Intravenous, Daily  piperacillin-tazobactam (ZOSYN) 3,375 mg in dextrose 5 % 50 mL IVPB extended infusion (mini-bag), 3,375 mg, Intravenous, Q8H      Allergies   Allergen Reactions    Codeine Other (See Comments)     Ears ringing    Morphine Other (See Comments)     hallucinations        REVIEW OF SYSTEMS:    CONSTITUTIONAL:   Per HPI   EYES:  negative for blurred vision, eye discharge, visual disturbance and icterus  HEENT:  negative for acute hearing loss, tinnitus, ear drainage, sinus pressure, nasal congestion, epistaxis and snoring  RESPIRATORY:  No cough, shortness of breath, hemoptysis  CARDIOVASCULAR:  negative for chest pain, palpitations, exertional chest pressure/discomfort, edema, syncope  GASTROINTESTINAL:   Per HPI   GENITOURINARY:  negative for frequency, dysuria, urinary incontinence, decreased urine volume, and hematuria  HEMATOLOGIC/LYMPHATIC:  negative for easy bruising, bleeding and lymphadenopathy  ALLERGIC/IMMUNOLOGIC:  negative for recurrent infections, angioedema, anaphylaxis and drug reactions  ENDOCRINE:  negative for weight changes and diabetic symptoms including polyuria, polydipsia and polyphagia  MUSCULOSKELETAL:  negative for acute joint swelling, decreased range of motion and muscle weakness  NEUROLOGICAL:  negative for headaches, slurred speech, unilateral weakness  PSYCHIATRIC/BEHAVIORAL: negative for hallucinations, behavioral problems, confusion and agitation. Objective:   PHYSICAL EXAM:      VITALS:  /66   Pulse 88   Temp 97.7 °F (36.5 °C) (Oral)   Resp 16   Ht 5' 5\" (1.651 m)   Wt 126 lb 8.7 oz (57.4 kg)   SpO2 99%   BMI 21.06 kg/m²      24HR INTAKE/OUTPUT:      Intake/Output Summary (Last 24 hours) at 2/2/2021 1355  Last data filed at 2/2/2021 1254  Gross per 24 hour   Intake 10 ml   Output 287 ml   Net -277 ml     CONSTITUTIONAL:  Awake, alert, cooperative, no apparent distress, and appears stated age  [de-identified]: NCAT, PERRL, EOMI. Sclera white, conjunctiva full. OP with moist mucosal membranes, no thrush, tongue protrudes midline  NECK:  Supple, symmetrical, trachea midline, no adenopathy  LUNGS:  no increased work of breathing, CTA gregory without W/R/R  CARDIOVASCULAR:  RRR without murmur  ABDOMEN:  normal bowel sounds, very tender with vol guarding. RUPAL in place   PSYCHIATRIC: Oriented to person place and time. No obvious depression or anxiety. MUSCULOSKELETAL: No obvious misalignment or effusion of the joints. No clubbing, cyanosis of the digits. SKIN:  normal skin color, texture, turgor and no redness, warmth, or swelling.  No palpable nodules or stigmata of embolic phenomenon  NEUROLOGIC: nonfocal exam  ACCESS:  IV site ok       DATA:    Old records have been reviewed    CBC:  Recent Labs     02/01/21  1639 02/02/21  0617   WBC 9.8 6.2   RBC 4.52 4.09   HGB 13.2 12.0   HCT 39.4 36.0    196   MCV 87.3 88.1   MCH 29.3 29.4   MCHC 33.6 33.4   RDW 14.3 14.1      BMP:  Recent Labs     02/01/21  1639 02/02/21  0617    139   K 3.9 3.6   CL 98* 104   CO2 25 25   BUN 10 8   CREATININE <0.5* <0.5*   CALCIUM 9.7 8.9   GLUCOSE 132* 94        Cultures:   2/1 BC x2 NGTD   UA neg      Radiology Review:  All pertinent images / reports were reviewed as a part of this visit. CT a/p 2/1/21  Impression   Perforation of diverticulitis, colitis or malignancy involving the mid   sigmoid colon with adjacent 4.6 x 3.3 x 2.7 cm abscess.  Small amount air in   the abscess cavity may either be due to gas forming infection or fistulous   connection to the sigmoid. Assessment:     Patient Active Problem List   Diagnosis    Primary osteoarthritis of both hips    Lung nodule seen on imaging study    Neck pain    Mixed hyperlipidemia    White coat syndrome without diagnosis of hypertension    Osteopenia of multiple sites    Family history of breast cancer in mother    Asymptomatic varicose veins of right lower extremity    Stress reaction    Perforation of sigmoid colon ([address]    Melanotic stools    Unintentional weight loss    Diverticulosis    Transaminitis    Abscess of sigmoid colon       Admitted with several months abd pain, tenesmus, altered stools, hematochezia, weight loss   Found to have pelvic abscess with abnormal sigmoid, perhaps from diverticulitis  S/p IR perc drain of pelvic abscess, cultures pending     -continue Zosyn pending further data  -add antifungal therapy   -choice, route, length of therapy with be determined by cultures results and clinical progress   -remove bowling if possible     D/w RN   Will follow          Muriel Eli M.D. Thank you for the opportunity to participate in the care of your patient.     Please do not hesitate to contact me:   [phone] office

## 2021-02-03 LAB
ALBUMIN SERPL-MCNC: 3.2 G/DL (ref 3.4–5)
ALP BLD-CCNC: 129 U/L (ref 40–129)
ALT SERPL-CCNC: 26 U/L (ref 10–40)
ANION GAP SERPL CALCULATED.3IONS-SCNC: 10 MMOL/L (ref 3–16)
AST SERPL-CCNC: 15 U/L (ref 15–37)
BASOPHILS ABSOLUTE: 0 K/UL (ref 0–0.2)
BASOPHILS RELATIVE PERCENT: 0.3 %
BILIRUB SERPL-MCNC: 0.9 MG/DL (ref 0–1)
BILIRUBIN DIRECT: 0.4 MG/DL (ref 0–0.3)
BILIRUBIN, INDIRECT: 0.5 MG/DL (ref 0–1)
BUN BLDV-MCNC: 9 MG/DL (ref 7–20)
CALCIUM SERPL-MCNC: 8.9 MG/DL (ref 8.3–10.6)
CHLORIDE BLD-SCNC: 106 MMOL/L (ref 99–110)
CO2: 24 MMOL/L (ref 21–32)
CREAT SERPL-MCNC: <0.5 MG/DL (ref 0.6–1.2)
EKG ATRIAL RATE: 78 BPM
EKG DIAGNOSIS: NORMAL
EKG P AXIS: 67 DEGREES
EKG P-R INTERVAL: 154 MS
EKG Q-T INTERVAL: 444 MS
EKG QRS DURATION: 74 MS
EKG QTC CALCULATION (BAZETT): 506 MS
EKG R AXIS: 58 DEGREES
EKG T AXIS: 48 DEGREES
EKG VENTRICULAR RATE: 78 BPM
EOSINOPHILS ABSOLUTE: 0 K/UL (ref 0–0.6)
EOSINOPHILS RELATIVE PERCENT: 0.3 %
GFR AFRICAN AMERICAN: >60
GFR NON-AFRICAN AMERICAN: >60
GLUCOSE BLD-MCNC: 77 MG/DL (ref 70–99)
HCT VFR BLD CALC: 35.7 % (ref 36–48)
HEMOGLOBIN: 11.8 G/DL (ref 12–16)
LYMPHOCYTES ABSOLUTE: 0.7 K/UL (ref 1–5.1)
LYMPHOCYTES RELATIVE PERCENT: 13.6 %
MCH RBC QN AUTO: 29.4 PG (ref 26–34)
MCHC RBC AUTO-ENTMCNC: 33.2 G/DL (ref 31–36)
MCV RBC AUTO: 88.8 FL (ref 80–100)
MONOCYTES ABSOLUTE: 0.2 K/UL (ref 0–1.3)
MONOCYTES RELATIVE PERCENT: 4.3 %
NEUTROPHILS ABSOLUTE: 4.3 K/UL (ref 1.7–7.7)
NEUTROPHILS RELATIVE PERCENT: 81.5 %
PDW BLD-RTO: 14.2 % (ref 12.4–15.4)
PLATELET # BLD: 178 K/UL (ref 135–450)
PMV BLD AUTO: 7.5 FL (ref 5–10.5)
POTASSIUM SERPL-SCNC: 3.7 MMOL/L (ref 3.5–5.1)
RBC # BLD: 4.02 M/UL (ref 4–5.2)
SODIUM BLD-SCNC: 140 MMOL/L (ref 136–145)
TOTAL PROTEIN: 5.7 G/DL (ref 6.4–8.2)
WBC # BLD: 5.3 K/UL (ref 4–11)

## 2021-02-03 PROCEDURE — 85025 COMPLETE CBC W/AUTO DIFF WBC: CPT

## 2021-02-03 PROCEDURE — 6370000000 HC RX 637 (ALT 250 FOR IP): Performed by: INTERNAL MEDICINE

## 2021-02-03 PROCEDURE — 99232 SBSQ HOSP IP/OBS MODERATE 35: CPT | Performed by: SURGERY

## 2021-02-03 PROCEDURE — 2580000003 HC RX 258: Performed by: HOSPITALIST

## 2021-02-03 PROCEDURE — 6360000002 HC RX W HCPCS: Performed by: INTERNAL MEDICINE

## 2021-02-03 PROCEDURE — C9113 INJ PANTOPRAZOLE SODIUM, VIA: HCPCS | Performed by: HOSPITALIST

## 2021-02-03 PROCEDURE — 6360000002 HC RX W HCPCS: Performed by: HOSPITALIST

## 2021-02-03 PROCEDURE — 36415 COLL VENOUS BLD VENIPUNCTURE: CPT

## 2021-02-03 PROCEDURE — 93010 ELECTROCARDIOGRAM REPORT: CPT | Performed by: INTERNAL MEDICINE

## 2021-02-03 PROCEDURE — APPSS45 APP SPLIT SHARED TIME 31-45 MINUTES: Performed by: CLINICAL NURSE SPECIALIST

## 2021-02-03 PROCEDURE — 80076 HEPATIC FUNCTION PANEL: CPT

## 2021-02-03 PROCEDURE — 99232 SBSQ HOSP IP/OBS MODERATE 35: CPT | Performed by: INTERNAL MEDICINE

## 2021-02-03 PROCEDURE — 1200000000 HC SEMI PRIVATE

## 2021-02-03 PROCEDURE — 80048 BASIC METABOLIC PNL TOTAL CA: CPT

## 2021-02-03 RX ADMIN — KETOROLAC TROMETHAMINE 15 MG: 30 INJECTION, SOLUTION INTRAMUSCULAR at 21:26

## 2021-02-03 RX ADMIN — PIPERACILLIN AND TAZOBACTAM 3375 MG: 3; .375 INJECTION, POWDER, FOR SOLUTION INTRAVENOUS at 01:10

## 2021-02-03 RX ADMIN — PANTOPRAZOLE SODIUM 40 MG: 40 INJECTION, POWDER, FOR SOLUTION INTRAVENOUS at 09:12

## 2021-02-03 RX ADMIN — KETOROLAC TROMETHAMINE 15 MG: 30 INJECTION, SOLUTION INTRAMUSCULAR at 12:03

## 2021-02-03 RX ADMIN — FLUCONAZOLE 200 MG: 100 TABLET ORAL at 09:12

## 2021-02-03 RX ADMIN — PIPERACILLIN AND TAZOBACTAM 3375 MG: 3; .375 INJECTION, POWDER, FOR SOLUTION INTRAVENOUS at 09:12

## 2021-02-03 RX ADMIN — Medication 10 ML: at 09:12

## 2021-02-03 RX ADMIN — PIPERACILLIN AND TAZOBACTAM 3375 MG: 3; .375 INJECTION, POWDER, FOR SOLUTION INTRAVENOUS at 17:00

## 2021-02-03 ASSESSMENT — PAIN SCALES - GENERAL
PAINLEVEL_OUTOF10: 5
PAINLEVEL_OUTOF10: 0

## 2021-02-03 NOTE — PROGRESS NOTES
Infectious Disease Follow up Notes    CC :  Pelvic abscess     Antibiotics:   Zosyn 3.375 q8  Fluconazole 200mg po daily     Admit Date:   2/1/2021  Hospital Day: 3    Subjective:   She remains afebrile  She seems to having much less discomfort today. Less discomfort with BM. Hungry. No N/V    Objective:     Patient Vitals for the past 8 hrs:   BP Temp Temp src Pulse Resp SpO2   02/03/21 1045 (!) 141/70 97.5 °F (36.4 °C) Oral 80 16 100 %   02/03/21 0900 (!) 140/64 97.6 °F (36.4 °C) Oral 99 16 97 %       EXAM:  General:  Alert, oriented, more animated today, NAD    HEENT:  NCAT, PERRL, sclera anicteric   No thrush  NECK:   supple      LUNGS:  Non-labored breathing  ABD: Less tender.   Drain in place with purulent effluent   EXT: No focal rash          LINE: IV site ok         Scheduled Meds:   fluconazole  200 mg Oral Daily    sodium chloride flush  10 mL Intravenous 2 times per day    pantoprazole  40 mg Intravenous Daily    piperacillin-tazobactam  3,375 mg Intravenous Q8H       Continuous Infusions:   sodium chloride 100 mL/hr at 02/02/21 2131          Data Review:    Lab Results   Component Value Date    WBC 5.3 02/03/2021    HGB 11.8 (L) 02/03/2021    HCT 35.7 (L) 02/03/2021    MCV 88.8 02/03/2021     02/03/2021     Lab Results   Component Value Date    CREATININE <0.5 (L) 02/03/2021    BUN 9 02/03/2021     02/03/2021    K 3.7 02/03/2021     02/03/2021    CO2 24 02/03/2021       Hepatic Function Panel:   Lab Results   Component Value Date    ALKPHOS 129 02/03/2021    ALT 26 02/03/2021    AST 15 02/03/2021    PROT 5.7 02/03/2021    BILITOT 0.9 02/03/2021    BILIDIR 0.4 02/03/2021    IBILI 0.5 02/03/2021    LABALBU 3.2 02/03/2021       Cultures:   2/1       BC x2 NGTD              UA neg  2/2 Abscess culture light growth mixed enteric marisol, GS 1+ GPC         Radiology Review:  All pertinent images / reports were reviewed as a part of this visit.      CT a/p 2/1/21  Impression   Perforation of diverticulitis, colitis or malignancy involving the mid   sigmoid colon with adjacent 4.6 x 3.3 x 2.7 cm abscess.  Small amount air in   the abscess cavity may either be due to gas forming infection or fistulous   connection to the sigmoid.        Assessment:     Patient Active Problem List    Diagnosis Date Noted    Abscess of sigmoid colon     Perforation of sigmoid colon [address] [address] 02/01/2021    Melanotic stools 02/01/2021    Unintentional weight loss 02/01/2021    Diverticulosis 02/01/2021    Transaminitis 02/01/2021    Asymptomatic varicose veins of right lower extremity 09/18/2019    Stress reaction 09/18/2019    Lung nodule seen on imaging study 08/06/2018    Neck pain 08/06/2018    Mixed hyperlipidemia 08/06/2018    White coat syndrome without diagnosis of hypertension 08/06/2018    Osteopenia of multiple sites 08/06/2018    Family history of breast cancer in mother 08/06/2018    Primary osteoarthritis of both hips 09/18/2017       Admitted with several months abd pain, tenesmus, altered stools, hematochezia, weight loss   Found to have pelvic abscess with abnormal sigmoid, perhaps from diverticulitis  S/p IR perc drain of pelvic abscess, culture with mixed enteric marisol and GPC on the stain  Clinically improved; without systemic signs of infection  -continue Zosyn, fluconazole pending further culture data   -monitor drain output       Discussed with patient/family, all questions answered        Pippa Desir MD  Phone: [phone]   Fax : [phone]

## 2021-02-03 NOTE — CARE COORDINATION
CM attempted to see patient to complete initial assessment. Dr Diamond Martel evaluating patient. Per conversation with nursing staff at 1100 huddle, pt IPTA and has a cane. Will not have needs at discharge. Per chart review, s/p perc drain 2/2. Please consult CM team if needs arise.      Susan Pollard RN CM

## 2021-02-03 NOTE — PROGRESS NOTES
Cristobal catheter removed at this time, tolerated without complaint. Pt able to post void shortly after with mild discomfort.

## 2021-02-03 NOTE — PROGRESS NOTES
Hospitalist Progress Note      PCP: James Marshall MD    Date of Admission: 2/1/2021    Chief Complaint: Anorexia, abdominal pain, weight loss    Hospital Course: H&P reviewed. Patient admitted with suspected bowel perforation with pelvic abscess, intermittent GI bleed    Subjective:      s/p percut  drain placement yesterday. Patient reports abdominal pain improved with no nausea or vomiting. Denies any melena, hematochezia        Medications:  Reviewed    Infusion Medications    sodium chloride 100 mL/hr at 02/02/21 2131     Scheduled Medications    fluconazole  200 mg Oral Daily    sodium chloride flush  10 mL Intravenous 2 times per day    pantoprazole  40 mg Intravenous Daily    piperacillin-tazobactam  3,375 mg Intravenous Q8H     PRN Meds: ketorolac, sodium chloride flush, potassium chloride **OR** potassium alternative oral replacement **OR** potassium chloride, magnesium sulfate, promethazine **OR** ondansetron, senna, acetaminophen **OR** acetaminophen      Intake/Output Summary (Last 24 hours) at 2/3/2021 0993  Last data filed at 2/3/2021 0842  Gross per 24 hour   Intake 120 ml   Output 37 ml   Net 83 ml       Physical Exam Performed:    BP (!) 140/64   Pulse 99   Temp 97.6 °F (36.4 °C) (Oral)   Resp 16   Ht 5' 5\" (1.651 m)   Wt 126 lb 14.4 oz (57.6 kg)   SpO2 97%   BMI 21.12 kg/m²     General appearance: No apparent distress, appears stated age and cooperative. HEENT: Pupils equal, round,  Conjunctivae/corneas clear. Neck: Supple, with full range of motion. No jugular venous distention. Trachea midline. Respiratory:  Normal respiratory effort. Clear to auscultation, bilaterally without Rales/Wheezes/Rhonchi. Cardiovascular: Regular rate and rhythm with normal S1/S2 without murmurs, rubs or gallops. Abdomen: Soft, minimal abdominal tenderness with no rebound or guarding, non-distended with + bowel sounds.   Percutaneous drain in place with serosanguineous fluid  Musculoskeletal: No clubbing, cyanosis or edema bilaterally. Skin: Warm and dry  Neurologic: Alert speech clear with no overt facial droop  Psychiatric: Alert and oriented, thought content appropriate, normal insight        Labs:   Recent Labs     02/01/21  1639 02/02/21  0617 02/02/21  1436 02/03/21  0601   WBC 9.8 6.2  --  5.3   HGB 13.2 12.0 13.1 11.8*   HCT 39.4 36.0 39.4 35.7*    196  --  178     Recent Labs     02/01/21  1639 02/02/21  0617 02/03/21  0601    139 140   K 3.9 3.6 3.7   CL 98* 104 106   CO2 25 25 24   BUN 10 8 9   CREATININE <0.5* <0.5* <0.5*   CALCIUM 9.7 8.9 8.9     Recent Labs     02/01/21  1639 02/02/21  0617   AST 43* 23   ALT 54* 37   BILITOT 1.0 0.6   ALKPHOS 196* 148*     Recent Labs     02/02/21  0944   INR 1.35*     No results for input(s): Monson Alejandro in the last 72 hours. Urinalysis:      Lab Results   Component Value Date    NITRU Negative 02/01/2021    WBCUA 6-10 07/21/2018    BACTERIA 1+ 07/19/2018    RBCUA 20-50 07/21/2018    BLOODU Negative 02/01/2021    SPECGRAV <=1.005 02/01/2021    GLUCOSEU Negative 02/01/2021    GLUCOSEU Neg 02/03/2010       Radiology:  CT PERITONEAL/RETROPERITONEAL PERC DRAIN   Final Result      XR CHEST PORTABLE   Final Result   No acute process in the chest by radiograph. CT ABDOMEN PELVIS W IV CONTRAST Additional Contrast? None   Final Result   Perforation of diverticulitis, colitis or malignancy involving the mid   sigmoid colon with adjacent 4.6 x 3.3 x 2.7 cm abscess. Small amount air in   the abscess cavity may either be due to gas forming infection or fistulous   connection to the sigmoid.                  Assessment/Plan:    Active Hospital Problems    Diagnosis    Abscess of sigmoid colon [K63.0]    Perforation of sigmoid colon (HCC) [K63.1]    Melanotic stools [K92.1]    Unintentional weight loss [R63.4]    Diverticulosis [K57.90]    Transaminitis [R74.01]    Mixed hyperlipidemia [E78.2]     Bowel perforation with intra-abdominal abscess: noted on CT. S/p  percutaneous drain placed by IR. General surgeon ID assisting. Continue empiric abx with zosyn, antifungal.  Follow culture        Intermittent melena at home : no overt signs of GIB here. GI consulted -recs appreciated. No plans for endoscopy in near future in setting of perforation. hgb drop to 11.8 today  likely due dilutional due to IVF, denied any melena, hematochezia. Monitor CBC          Hyperlipidemia: statin held due to elevated LFTs.          GERD- continue PPI      Transminitis: mild,  statin held.  Monitor LFTs       DVT Prophylaxis: SCDs       Diet: DIET CLEAR LIQUID;  Code Status: Full Code    PT/OT Eval Status: not indicated at this time     Dispo - hard to say, pending clinical course     Torin Jacinto MD

## 2021-02-03 NOTE — PROGRESS NOTES
[facility] SURGERY    PATIENT NAME: Tori Ruelas     TODAY'S DATE: 2/3/2021    CHIEF COMPLAINT: lower abd pain    INTERVAL HISTORY/HPI:    Pt reports lower abd pain around drain. No nausea. REVIEW OF SYSTEMS:  Pertinent positives and negatives as per interval history section    OBJECTIVE:  VITALS:  BP (!) 141/70   Pulse 80   Temp 97.5 °F (36.4 °C) (Oral)   Resp 16   Ht 5' 5\" (1.651 m)   Wt 126 lb 14.4 oz (57.6 kg)   SpO2 100%   BMI 21.12 kg/m²     INTAKE/OUTPUT:    I/O last 3 completed shifts: In: 120 [P.O.:120]  Out: 37 [Drains:37]  No intake/output data recorded. CONSTITUTIONAL:  awake and alert  LUNGS:  Respirations easy and unlabored, no crackles or wheezing  CARD:  regular rate and rhythm  ABDOMEN:  hypoactive bowel sounds, soft, non-distended, tenderness noted lower abd, perc drain in place with serosanguinous output     Data:  CBC:   Recent Labs     02/01/21  1639 02/02/21  0617 02/02/21  1436 02/03/21  0601   WBC 9.8 6.2  --  5.3   HGB 13.2 12.0 13.1 11.8*   HCT 39.4 36.0 39.4 35.7*    196  --  178     BMP:    Recent Labs     02/01/21  1639 02/02/21  0617 02/03/21  0601    139 140   K 3.9 3.6 3.7   CL 98* 104 106   CO2 25 25 24   BUN 10 8 9   CREATININE <0.5* <0.5* <0.5*   GLUCOSE 132* 94 77     Hepatic:   Recent Labs     02/01/21  1639 02/02/21  0617   AST 43* 23   ALT 54* 37   BILITOT 1.0 0.6   ALKPHOS 196* 148*     Mag:    No results for input(s): MG in the last 72 hours. Phos:   No results for input(s): PHOS in the last 72 hours. INR:   Recent Labs     02/02/21  0944   INR 1.35*     Specimen Source: Aspirate     Gram Stain Result 1+ WBC's (Polymorphonuclear)   1+ Gram positive cocci    Narrative:     ORDER#: [id number]                          ORDERED BY: Breonna John   SOURCE: Aspirate pelvic abcess             COLLECTED:  02/02/21 12:30   ANTIBIOTICS AT [facility]. :                      RECEIVED :  02/02         Radiology Review:  *Imaging personally reviewed by me.   PROCEDURE:   IR ABSCESS DRAIN PERITONEAL PERCUTANEOUS     MODERATE CONSCIOUS SEDATION     2/2/2021     TECHNIQUE:   Dose modulation, iterative reconstruction, and/or weight based adjustment of   the mA/kV was utilized to reduce the radiation dose to as low as reasonably   achievable. HISTORY:   ORDERING SYSTEM PROVIDED HISTORY: pelvic abscess   TECHNOLOGIST PROVIDED HISTORY:   Reason for exam:->pelvic abscess   Reason for Exam: pelvic abcess     CONTRAST:   None     SEDATION:   1 mg Versed, 25 mcg fentanyl administered by the IR nurse, sedation time 21   minutes     FLUOROSCOPY TIME:   None     DESCRIPTION OF PROCEDURE:   Informed consent was obtained after a detailed explanation of the procedure   including risks, benefits, and alternatives.  Universal protocol was observed. Limited CT images through the pelvis were obtained re-demonstrating an   abscess to the left of midline above the urinary bladder.  A skin site was   selected and prepped and draped in sterile fashion.  Local anesthesia with 1%   lidocaine.  5 [employer] Yueh catheter was inserted into the abscess.  The trocar   was removed and an 035 guidewire was placed.  Yueh catheter was removed,   dilatations were performed, and a 10 [employer] Frieda multi side hole drainage catheter   was placed.  The catheter was locked and sutured in place.  Approximately 12   cc of moderately thick brown blood tinged pus was aspirated.  The patient   tolerated the procedure well with no immediate complications. ASSESSMENT AND PLAN:  Pelvic abscess or uncertain etiology - diverticular disease vs colon mass/CA. S/P perc drain yesterday - continue with current antibiotics and follow final culture results. Clear liquid diet today. Will need colonoscopy once acute inflammation has improved and will eventually require colon resection. Discussed at length with the patient who also asked us to speak with her daughter - will call the daughter today. Electronically signed by EMA Garcia - CNP     [id number]    Patient seen and agree with above. Some pain at drain site, no nausea, no fevers or chills. Some flatus.   Start liquid diet and continue IV abx    Kath Coley MD

## 2021-02-04 LAB
A/G RATIO: 1 (ref 1.1–2.2)
ALBUMIN SERPL-MCNC: 2.8 G/DL (ref 3.4–5)
ALP BLD-CCNC: 139 U/L (ref 40–129)
ALT SERPL-CCNC: 23 U/L (ref 10–40)
ANION GAP SERPL CALCULATED.3IONS-SCNC: 9 MMOL/L (ref 3–16)
AST SERPL-CCNC: 19 U/L (ref 15–37)
BASOPHILS ABSOLUTE: 0 K/UL (ref 0–0.2)
BASOPHILS RELATIVE PERCENT: 0.4 %
BILIRUB SERPL-MCNC: 0.7 MG/DL (ref 0–1)
BODY FLUID CULTURE, STERILE: ABNORMAL
BODY FLUID CULTURE, STERILE: ABNORMAL
BUN BLDV-MCNC: 5 MG/DL (ref 7–20)
CALCIUM SERPL-MCNC: 8.7 MG/DL (ref 8.3–10.6)
CHLORIDE BLD-SCNC: 105 MMOL/L (ref 99–110)
CO2: 23 MMOL/L (ref 21–32)
CREAT SERPL-MCNC: <0.5 MG/DL (ref 0.6–1.2)
EOSINOPHILS ABSOLUTE: 0 K/UL (ref 0–0.6)
EOSINOPHILS RELATIVE PERCENT: 0.6 %
GFR AFRICAN AMERICAN: >60
GFR NON-AFRICAN AMERICAN: >60
GLOBULIN: 2.7 G/DL
GLUCOSE BLD-MCNC: 102 MG/DL (ref 70–99)
GRAM STAIN RESULT: ABNORMAL
HCT VFR BLD CALC: 35.5 % (ref 36–48)
HEMOGLOBIN: 11.7 G/DL (ref 12–16)
LYMPHOCYTES ABSOLUTE: 0.7 K/UL (ref 1–5.1)
LYMPHOCYTES RELATIVE PERCENT: 16.8 %
MCH RBC QN AUTO: 29.2 PG (ref 26–34)
MCHC RBC AUTO-ENTMCNC: 32.9 G/DL (ref 31–36)
MCV RBC AUTO: 88.8 FL (ref 80–100)
MONOCYTES ABSOLUTE: 0.2 K/UL (ref 0–1.3)
MONOCYTES RELATIVE PERCENT: 5.4 %
NEUTROPHILS ABSOLUTE: 3.2 K/UL (ref 1.7–7.7)
NEUTROPHILS RELATIVE PERCENT: 76.8 %
ORGANISM: ABNORMAL
PDW BLD-RTO: 14.3 % (ref 12.4–15.4)
PLATELET # BLD: 177 K/UL (ref 135–450)
PMV BLD AUTO: 7.6 FL (ref 5–10.5)
POTASSIUM SERPL-SCNC: 3.6 MMOL/L (ref 3.5–5.1)
RBC # BLD: 4 M/UL (ref 4–5.2)
SODIUM BLD-SCNC: 137 MMOL/L (ref 136–145)
TOTAL PROTEIN: 5.5 G/DL (ref 6.4–8.2)
WBC # BLD: 4.2 K/UL (ref 4–11)

## 2021-02-04 PROCEDURE — 2580000003 HC RX 258: Performed by: INTERNAL MEDICINE

## 2021-02-04 PROCEDURE — 99232 SBSQ HOSP IP/OBS MODERATE 35: CPT | Performed by: INTERNAL MEDICINE

## 2021-02-04 PROCEDURE — 6360000002 HC RX W HCPCS: Performed by: INTERNAL MEDICINE

## 2021-02-04 PROCEDURE — C9113 INJ PANTOPRAZOLE SODIUM, VIA: HCPCS | Performed by: HOSPITALIST

## 2021-02-04 PROCEDURE — 84439 ASSAY OF FREE THYROXINE: CPT

## 2021-02-04 PROCEDURE — 6370000000 HC RX 637 (ALT 250 FOR IP): Performed by: INTERNAL MEDICINE

## 2021-02-04 PROCEDURE — 2580000003 HC RX 258: Performed by: HOSPITALIST

## 2021-02-04 PROCEDURE — 36415 COLL VENOUS BLD VENIPUNCTURE: CPT

## 2021-02-04 PROCEDURE — 80053 COMPREHEN METABOLIC PANEL: CPT

## 2021-02-04 PROCEDURE — 6360000002 HC RX W HCPCS: Performed by: HOSPITALIST

## 2021-02-04 PROCEDURE — 82378 CARCINOEMBRYONIC ANTIGEN: CPT

## 2021-02-04 PROCEDURE — 85025 COMPLETE CBC W/AUTO DIFF WBC: CPT

## 2021-02-04 PROCEDURE — 1200000000 HC SEMI PRIVATE

## 2021-02-04 PROCEDURE — 84443 ASSAY THYROID STIM HORMONE: CPT

## 2021-02-04 PROCEDURE — 99232 SBSQ HOSP IP/OBS MODERATE 35: CPT | Performed by: SURGERY

## 2021-02-04 RX ADMIN — FLUCONAZOLE 200 MG: 100 TABLET ORAL at 09:27

## 2021-02-04 RX ADMIN — PIPERACILLIN AND TAZOBACTAM 3375 MG: 3; .375 INJECTION, POWDER, FOR SOLUTION INTRAVENOUS at 17:26

## 2021-02-04 RX ADMIN — SODIUM CHLORIDE: 9 INJECTION, SOLUTION INTRAVENOUS at 06:14

## 2021-02-04 RX ADMIN — Medication 10 ML: at 21:39

## 2021-02-04 RX ADMIN — PANTOPRAZOLE SODIUM 40 MG: 40 INJECTION, POWDER, FOR SOLUTION INTRAVENOUS at 09:27

## 2021-02-04 RX ADMIN — PIPERACILLIN AND TAZOBACTAM 3375 MG: 3; .375 INJECTION, POWDER, FOR SOLUTION INTRAVENOUS at 02:02

## 2021-02-04 RX ADMIN — Medication 10 ML: at 09:28

## 2021-02-04 RX ADMIN — KETOROLAC TROMETHAMINE 15 MG: 30 INJECTION, SOLUTION INTRAMUSCULAR at 17:26

## 2021-02-04 RX ADMIN — PIPERACILLIN AND TAZOBACTAM 3375 MG: 3; .375 INJECTION, POWDER, FOR SOLUTION INTRAVENOUS at 09:27

## 2021-02-04 ASSESSMENT — PAIN SCALES - GENERAL
PAINLEVEL_OUTOF10: 0
PAINLEVEL_OUTOF10: 6
PAINLEVEL_OUTOF10: 0
PAINLEVEL_OUTOF10: 7

## 2021-02-04 NOTE — PROGRESS NOTES
Hospitalist Progress Note      PCP: Srinivas Mcgee MD    Date of Admission: 2/1/2021    Chief Complaint: Anorexia, abdominal pain, weight loss    Hospital Course: H&P reviewed. Patient admitted with suspected bowel perforation with pelvic abscess, intermittent GI bleed    Subjective:     Patient tolerating clear liquid diet with minimal abdominal pain. Denies any nausea, vomiting        Medications:  Reviewed    Infusion Medications    sodium chloride 100 mL/hr at 02/04/21 0614     Scheduled Medications    fluconazole  200 mg Oral Daily    sodium chloride flush  10 mL Intravenous 2 times per day    pantoprazole  40 mg Intravenous Daily    piperacillin-tazobactam  3,375 mg Intravenous Q8H     PRN Meds: ketorolac, sodium chloride flush, potassium chloride **OR** potassium alternative oral replacement **OR** potassium chloride, magnesium sulfate, promethazine **OR** ondansetron, senna, acetaminophen **OR** acetaminophen      Intake/Output Summary (Last 24 hours) at 2/4/2021 0937  Last data filed at 2/4/2021 6140  Gross per 24 hour   Intake 360 ml   Output 790 ml   Net -430 ml       Physical Exam Performed:    BP (!) 109/59   Pulse 74   Temp 98.1 °F (36.7 °C) (Oral)   Resp 16   Ht 5' 5\" (1.651 m)   Wt 132 lb 9.6 oz (60.1 kg)   SpO2 99%   BMI 22.07 kg/m²     General appearance: No apparent distress, appears stated age and cooperative. HEENT: Pupils equal, round,  Conjunctivae/corneas clear. Neck: Supple, with full range of motion. No jugular venous distention. Trachea midline. Respiratory:  Normal respiratory effort. Clear to auscultation, bilaterally without Rales/Wheezes/Rhonchi. Cardiovascular: Regular rate and rhythm with normal S1/S2 without murmurs, rubs or gallops. Abdomen: Soft, minimal abdominal tenderness with no rebound or guarding, non-distended with + bowel sounds. Percutaneous drain in place with serosanguinous fluid  Musculoskeletal: No clubbing, cyanosis or edema bilaterally. Skin: Warm and dry  Neurologic: Alert speech clear with no overt facial droop  Psychiatric: Alert and oriented, thought content appropriate, normal insight        Labs:   Recent Labs     02/02/21  0617 02/02/21  1436 02/03/21  0601 02/04/21  0624   WBC 6.2  --  5.3 4.2   HGB 12.0 13.1 11.8* 11.7*   HCT 36.0 39.4 35.7* 35.5*     --  178 177     Recent Labs     02/02/21  0617 02/03/21  0601 02/04/21  0624    140 137   K 3.6 3.7 3.6    106 105   CO2 25 24 23   BUN 8 9 5*   CREATININE <0.5* <0.5* <0.5*   CALCIUM 8.9 8.9 8.7     Recent Labs     02/02/21 0617 02/03/21  0601 02/04/21  0624   AST 23 15 19   ALT 37 26 23   BILIDIR  --  0.4*  --    BILITOT 0.6 0.9 0.7   ALKPHOS 148* 129 139*     Recent Labs     02/02/21  0944   INR 1.35*     No results for input(s): David Cotton in the last 72 hours. Urinalysis:      Lab Results   Component Value Date    NITRU Negative 02/01/2021    WBCUA 6-10 07/21/2018    BACTERIA 1+ 07/19/2018    RBCUA 20-50 07/21/2018    BLOODU Negative 02/01/2021    SPECGRAV <=1.005 02/01/2021    GLUCOSEU Negative 02/01/2021    GLUCOSEU Neg 02/03/2010       Radiology:  CT PERITONEAL/RETROPERITONEAL PERC DRAIN   Final Result      XR CHEST PORTABLE   Final Result   No acute process in the chest by radiograph. CT ABDOMEN PELVIS W IV CONTRAST Additional Contrast? None   Final Result   Perforation of diverticulitis, colitis or malignancy involving the mid   sigmoid colon with adjacent 4.6 x 3.3 x 2.7 cm abscess. Small amount air in   the abscess cavity may either be due to gas forming infection or fistulous   connection to the sigmoid. Assessment/Plan:        Pelvic abscess: noted on CT with abnormal sigmoid- possibly due to perforated diverticulitis vrs malignancy. S/p  percutaneous drain placed by IR. Gen surg, ID assisting. Continue empiric abx with zosyn, antifungal.  Follow culture        Intermittent melena at home : no overt signs of GIB here. GI consulted-no plans for endoscopy in near future in setting of perforation. Likely in the few weeks after recovery from her abscess. hgb stable. Monitor CBC          Hyperlipidemia: statin held due to elevated LFTs.          GERD- continue PPI      Transminitis: mild,  statin held.  Monitor LFTs       DVT Prophylaxis: SCDs       Diet: DIET CLEAR LIQUID;  Code Status: Full Code    PT/OT Eval Status: not indicated at this time     Dispo - hard to say, pending clinical course     Gerda Davila MD

## 2021-02-04 NOTE — PROGRESS NOTES
Infectious Disease Follow up Notes    CC :  Pelvic abscess     Antibiotics:   Zosyn 3.375 q8  Fluconazole 200mg po daily     Admit Date:   2/1/2021  Hospital Day: 4    Subjective:   She remains afebrile  Feels ok today, felt \"woozy\" after taking one of her medications this morning, maybe the fluconazole. Currently comfortable     Objective:     Patient Vitals for the past 8 hrs:   BP Temp Temp src Pulse Resp SpO2 Weight   02/04/21 0815 (!) 109/59 98.1 °F (36.7 °C) Oral 74 16 99 % --   02/04/21 0615 112/63 97.6 °F (36.4 °C) Oral 69 15 97 % --   02/04/21 0400 -- -- -- -- -- -- 132 lb 9.6 oz (60.1 kg)       EXAM:  General:  Alert, oriented, NAD    HEENT:  NCAT, PERRL, sclera anicteric   No thrush  NECK:   supple      LUNGS:  Non-labored breathing  ABD: Less tender.   Drain in place with scant effluent   EXT: No focal rash          LINE: IV site ok         Scheduled Meds:   fluconazole  200 mg Oral Daily    sodium chloride flush  10 mL Intravenous 2 times per day    pantoprazole  40 mg Intravenous Daily    piperacillin-tazobactam  3,375 mg Intravenous Q8H       Continuous Infusions:   sodium chloride 100 mL/hr at 02/04/21 0614          Data Review:    Lab Results   Component Value Date    WBC 4.2 02/04/2021    HGB 11.7 (L) 02/04/2021    HCT 35.5 (L) 02/04/2021    MCV 88.8 02/04/2021     02/04/2021     Lab Results   Component Value Date    CREATININE <0.5 (L) 02/04/2021    BUN 5 (L) 02/04/2021     02/04/2021    K 3.6 02/04/2021     02/04/2021    CO2 23 02/04/2021       Hepatic Function Panel:   Lab Results   Component Value Date    ALKPHOS 139 02/04/2021    ALT 23 02/04/2021    AST 19 02/04/2021    PROT 5.5 02/04/2021    BILITOT 0.7 02/04/2021    BILIDIR 0.4 02/03/2021    IBILI 0.5 02/03/2021    LABALBU 2.8 02/04/2021       Cultures:   2/1       BC x2 NGTD              UA neg  2/2 Abscess culture light growth mixed

## 2021-02-05 LAB
A/G RATIO: 1.1 (ref 1.1–2.2)
ALBUMIN SERPL-MCNC: 3 G/DL (ref 3.4–5)
ALP BLD-CCNC: 175 U/L (ref 40–129)
ALT SERPL-CCNC: 28 U/L (ref 10–40)
ANION GAP SERPL CALCULATED.3IONS-SCNC: 7 MMOL/L (ref 3–16)
AST SERPL-CCNC: 28 U/L (ref 15–37)
BASOPHILS ABSOLUTE: 0 K/UL (ref 0–0.2)
BASOPHILS RELATIVE PERCENT: 0.4 %
BILIRUB SERPL-MCNC: 0.7 MG/DL (ref 0–1)
BLOOD CULTURE, ROUTINE: NORMAL
BUN BLDV-MCNC: 3 MG/DL (ref 7–20)
CALCIUM SERPL-MCNC: 8.7 MG/DL (ref 8.3–10.6)
CEA: 4.9 NG/ML (ref 0–5)
CHLORIDE BLD-SCNC: 105 MMOL/L (ref 99–110)
CO2: 27 MMOL/L (ref 21–32)
CREAT SERPL-MCNC: <0.5 MG/DL (ref 0.6–1.2)
CULTURE, BLOOD 2: NORMAL
EOSINOPHILS ABSOLUTE: 0 K/UL (ref 0–0.6)
EOSINOPHILS RELATIVE PERCENT: 0.9 %
GFR AFRICAN AMERICAN: >60
GFR NON-AFRICAN AMERICAN: >60
GLOBULIN: 2.8 G/DL
GLUCOSE BLD-MCNC: 146 MG/DL (ref 70–99)
HCT VFR BLD CALC: 36.4 % (ref 36–48)
HEMOGLOBIN: 11.9 G/DL (ref 12–16)
LYMPHOCYTES ABSOLUTE: 0.8 K/UL (ref 1–5.1)
LYMPHOCYTES RELATIVE PERCENT: 16.1 %
MCH RBC QN AUTO: 28.9 PG (ref 26–34)
MCHC RBC AUTO-ENTMCNC: 32.7 G/DL (ref 31–36)
MCV RBC AUTO: 88.3 FL (ref 80–100)
MONOCYTES ABSOLUTE: 0.3 K/UL (ref 0–1.3)
MONOCYTES RELATIVE PERCENT: 5.5 %
NEUTROPHILS ABSOLUTE: 3.8 K/UL (ref 1.7–7.7)
NEUTROPHILS RELATIVE PERCENT: 77.1 %
PDW BLD-RTO: 14.4 % (ref 12.4–15.4)
PLATELET # BLD: 190 K/UL (ref 135–450)
PMV BLD AUTO: 7.8 FL (ref 5–10.5)
POTASSIUM SERPL-SCNC: 3.2 MMOL/L (ref 3.5–5.1)
RBC # BLD: 4.13 M/UL (ref 4–5.2)
SODIUM BLD-SCNC: 139 MMOL/L (ref 136–145)
T4 FREE: 1.3 NG/DL (ref 0.9–1.8)
TOTAL PROTEIN: 5.8 G/DL (ref 6.4–8.2)
TSH SERPL DL<=0.05 MIU/L-ACNC: 2.35 UIU/ML (ref 0.27–4.2)
WBC # BLD: 4.9 K/UL (ref 4–11)

## 2021-02-05 PROCEDURE — 85025 COMPLETE CBC W/AUTO DIFF WBC: CPT

## 2021-02-05 PROCEDURE — 6370000000 HC RX 637 (ALT 250 FOR IP): Performed by: HOSPITALIST

## 2021-02-05 PROCEDURE — 1200000000 HC SEMI PRIVATE

## 2021-02-05 PROCEDURE — 2580000003 HC RX 258: Performed by: INTERNAL MEDICINE

## 2021-02-05 PROCEDURE — C9113 INJ PANTOPRAZOLE SODIUM, VIA: HCPCS | Performed by: HOSPITALIST

## 2021-02-05 PROCEDURE — 99232 SBSQ HOSP IP/OBS MODERATE 35: CPT | Performed by: INTERNAL MEDICINE

## 2021-02-05 PROCEDURE — 99232 SBSQ HOSP IP/OBS MODERATE 35: CPT | Performed by: SURGERY

## 2021-02-05 PROCEDURE — 2580000003 HC RX 258: Performed by: HOSPITALIST

## 2021-02-05 PROCEDURE — 6360000002 HC RX W HCPCS: Performed by: INTERNAL MEDICINE

## 2021-02-05 PROCEDURE — 36415 COLL VENOUS BLD VENIPUNCTURE: CPT

## 2021-02-05 PROCEDURE — 80053 COMPREHEN METABOLIC PANEL: CPT

## 2021-02-05 PROCEDURE — 6360000002 HC RX W HCPCS: Performed by: HOSPITALIST

## 2021-02-05 RX ADMIN — PIPERACILLIN AND TAZOBACTAM 3375 MG: 3; .375 INJECTION, POWDER, FOR SOLUTION INTRAVENOUS at 01:15

## 2021-02-05 RX ADMIN — KETOROLAC TROMETHAMINE 15 MG: 30 INJECTION, SOLUTION INTRAMUSCULAR at 00:04

## 2021-02-05 RX ADMIN — PANTOPRAZOLE SODIUM 40 MG: 40 INJECTION, POWDER, FOR SOLUTION INTRAVENOUS at 09:24

## 2021-02-05 RX ADMIN — KETOROLAC TROMETHAMINE 15 MG: 30 INJECTION, SOLUTION INTRAMUSCULAR at 20:53

## 2021-02-05 RX ADMIN — Medication 10 ML: at 09:25

## 2021-02-05 RX ADMIN — Medication 10 ML: at 20:52

## 2021-02-05 RX ADMIN — KETOROLAC TROMETHAMINE 15 MG: 30 INJECTION, SOLUTION INTRAMUSCULAR at 06:07

## 2021-02-05 RX ADMIN — KETOROLAC TROMETHAMINE 15 MG: 30 INJECTION, SOLUTION INTRAMUSCULAR at 15:08

## 2021-02-05 RX ADMIN — PIPERACILLIN AND TAZOBACTAM 3375 MG: 3; .375 INJECTION, POWDER, FOR SOLUTION INTRAVENOUS at 17:02

## 2021-02-05 RX ADMIN — POTASSIUM CHLORIDE 40 MEQ: 20 TABLET, EXTENDED RELEASE ORAL at 12:37

## 2021-02-05 RX ADMIN — PIPERACILLIN AND TAZOBACTAM 3375 MG: 3; .375 INJECTION, POWDER, FOR SOLUTION INTRAVENOUS at 09:24

## 2021-02-05 RX ADMIN — SENNOSIDES 8.6 MG: 8.6 TABLET, FILM COATED ORAL at 17:50

## 2021-02-05 RX ADMIN — SODIUM CHLORIDE: 9 INJECTION, SOLUTION INTRAVENOUS at 09:29

## 2021-02-05 ASSESSMENT — PAIN SCALES - GENERAL
PAINLEVEL_OUTOF10: 5
PAINLEVEL_OUTOF10: 6
PAINLEVEL_OUTOF10: 5

## 2021-02-05 ASSESSMENT — PAIN DESCRIPTION - LOCATION
LOCATION: ABDOMEN
LOCATION: ABDOMEN

## 2021-02-05 ASSESSMENT — PAIN DESCRIPTION - FREQUENCY: FREQUENCY: INTERMITTENT

## 2021-02-05 NOTE — PROGRESS NOTES
Hospitalist Progress Note      PCP: Jane Romano MD    Date of Admission: 2/1/2021    Chief Complaint: Anorexia, abdominal pain, weight loss    Hospital Course: H&P reviewed. Patient admitted with suspected bowel perforation with pelvic abscess, intermittent GI bleed    Subjective:       Complains of abdominal bloating when she ate breakfast with mild nausea. No vomiting       Medications:  Reviewed    Infusion Medications     Scheduled Medications    fluconazole  200 mg Oral Daily    sodium chloride flush  10 mL Intravenous 2 times per day    pantoprazole  40 mg Intravenous Daily    piperacillin-tazobactam  3,375 mg Intravenous Q8H     PRN Meds: ketorolac, sodium chloride flush, potassium chloride **OR** potassium alternative oral replacement **OR** potassium chloride, magnesium sulfate, promethazine **OR** ondansetron, senna, acetaminophen **OR** acetaminophen      Intake/Output Summary (Last 24 hours) at 2/5/2021 0948  Last data filed at 2/5/2021 3195  Gross per 24 hour   Intake 240 ml   Output 1500 ml   Net -1260 ml       Physical Exam Performed:    /64   Pulse 75   Temp 97.1 °F (36.2 °C) (Oral)   Resp 16   Ht 5' 5\" (1.651 m)   Wt 131 lb 6.4 oz (59.6 kg)   SpO2 98%   BMI 21.87 kg/m²     General appearance: No apparent distress, appears stated age and cooperative. HEENT: Pupils equal, round,  Conjunctivae/corneas clear. Neck: Supple, with full range of motion. No jugular venous distention. Trachea midline. Respiratory:  Normal respiratory effort. Clear to auscultation, bilaterally without Rales/Wheezes/Rhonchi. Cardiovascular: Regular rate and rhythm with normal S1/S2 without murmurs, rubs or gallops. Abdomen: Soft, minimal abdominal tenderness with no rebound or guarding, non-distended with + bowel sounds. Percutaneous drain in place with serosanguinous fluid  Musculoskeletal: No clubbing, cyanosis or edema bilaterally.     Skin: Warm and dry  Neurologic: Alert speech clear with no overt facial droop  Psychiatric: Alert and oriented, thought content appropriate, normal insight        Labs:   Recent Labs     02/03/21  0601 02/04/21  0624 02/05/21  0621   WBC 5.3 4.2 4.9   HGB 11.8* 11.7* 11.9*   HCT 35.7* 35.5* 36.4    177 190     Recent Labs     02/03/21  0601 02/04/21  0624    137   K 3.7 3.6    105   CO2 24 23   BUN 9 5*   CREATININE <0.5* <0.5*   CALCIUM 8.9 8.7     Recent Labs     02/03/21  0601 02/04/21  0624   AST 15 19   ALT 26 23   BILIDIR 0.4*  --    BILITOT 0.9 0.7   ALKPHOS 129 139*     No results for input(s): INR in the last 72 hours. No results for input(s): Towana Chidi in the last 72 hours. Urinalysis:      Lab Results   Component Value Date    NITRU Negative 02/01/2021    WBCUA 6-10 07/21/2018    BACTERIA 1+ 07/19/2018    RBCUA 20-50 07/21/2018    BLOODU Negative 02/01/2021    SPECGRAV <=1.005 02/01/2021    GLUCOSEU Negative 02/01/2021    GLUCOSEU Neg 02/03/2010       Radiology:  CT PERITONEAL/RETROPERITONEAL PERC DRAIN   Final Result      XR CHEST PORTABLE   Final Result   No acute process in the chest by radiograph. CT ABDOMEN PELVIS W IV CONTRAST Additional Contrast? None   Final Result   Perforation of diverticulitis, colitis or malignancy involving the mid   sigmoid colon with adjacent 4.6 x 3.3 x 2.7 cm abscess. Small amount air in   the abscess cavity may either be due to gas forming infection or fistulous   connection to the sigmoid. CT ABDOMEN PELVIS W IV CONTRAST    (Results Pending)           Assessment/Plan:        Pelvic abscess: noted on CT with abnormal sigmoid- possibly due to perforated diverticulitis vrs malignancy. S/p  percutaneous drain placed by IR. Gen surg, ID assisting. Continue empiric abx per ID  Follow culture. Repeat CT to be obtained tomorrow         Intermittent melena at home : no overt signs of GIB here. GI consulted-no plans for endoscopy in near future in setting of perforation.   Likely in the few weeks after recovery from her abscess. hgb stable. Monitor CBC          Hyperlipidemia: statin held due to elevated LFTs.          GERD- continue PPI      Abnormal LFT: mild,  statin held.  Monitor LFTs       DVT Prophylaxis: SCDs       Diet: DIET LOW FIBER;  Code Status: Full Code    PT/OT Eval Status: not indicated at this time     Dispo - hard to say, pending clinical course     Ratna Dominguez MD

## 2021-02-05 NOTE — DISCHARGE INSTR - COC
Continuity of Care Form    Patient Name: Racheal May   :  1950  MRN:  [id number]    Admit date:  2021  Discharge date:  ***    Code Status Order: Full Code   Advance Directives:   Advance Care Flowsheet Documentation     Date/Time Healthcare Directive Type of Healthcare Directive Copy in [address] Agent's Name Healthcare Agent's Phone Number    21 0350  No, patient does not have an advance directive for healthcare treatment -- -- -- -- --          Admitting Physician:  Kimo Wade MD  PCP: Leta Ibarra MD    Discharging Nurse: [facility] Unit/Room#: 6705/7770-18  Discharging Unit Phone Number: ***    Emergency Contact:   Extended Emergency Contact Information  Primary Emergency Contact:   Address: [address] Phone: [phone]  Mobile Phone: [phone]  Relation: Child    Past Surgical History:  Past Surgical History:   Procedure Laterality Date    BREAST ENHANCEMENT SURGERY  7364    silicon injected    CT RETROPERITONEAL PERC DRAIN  2021    CT RETROPERITONEAL PERC DRAIN 2021 Jacquiline Favre, MD [facility] CT SCAN    TUBAL LIGATION         Immunization History:   Immunization History   Administered Date(s) Administered    Pneumococcal Conjugate 13-valent (Vcglfac58) 08/10/2017    Pneumococcal Polysaccharide (Rtyropbqm38) 2020    Tdap (Boostrix, Adacel) 08/10/2017       Active Problems:  Patient Active Problem List   Diagnosis Code    Primary osteoarthritis of both hips M16.0    Lung nodule seen on imaging study R91.1    Neck pain M54.2    Mixed hyperlipidemia E78.2    White coat syndrome without diagnosis of hypertension R03.0    Osteopenia of multiple sites M85.89    Family history of breast cancer in mother Z80.2    Asymptomatic varicose veins of right lower extremity I83.91    Stress reaction F43.0    Perforated sigmoid colon ([email or web address] 75.) K63.1    Melanotic stools K92.1    Unintentional weight loss R63.4    Diverticulosis K57.90    Transaminitis R74.01    Abscess of sigmoid colon K63.0       Isolation/Infection:   Isolation          No Isolation        Patient Infection Status     None to display          Nurse Assessment:  Last Vital Signs: /63   Pulse 73   Temp 97.4 °F (36.3 °C) (Oral)   Resp 16   Ht 5' 5\" (1.651 m)   Wt 131 lb 6.4 oz (59.6 kg)   SpO2 96%   BMI 21.87 kg/m²     Last documented pain score (0-10 scale): Pain Level: 0  Last Weight:   Wt Readings from Last 1 Encounters:   02/05/21 131 lb 6.4 oz (59.6 kg)     Mental Status:  {IP PT MENTAL STATUS:20030}    IV Access:  { CARO IV ACCESS:610099041}    Nursing Mobility/ADLs:  Walking   {[facility] DME CDDX:365627649}  Transfer  {[facility] DME BIPP:151175693}  Bathing  {[facility] DME XBOK:573705353}  Dressing  {[facility] DME JTKH:012823284}  Toileting  {P DME WOWV:599155582}  Feeding  {[facility] DME EFCN:542753368}  Med Admin  {[facility] DME BWGH:858403193}  Med Delivery   { CARO MED Delivery:876333927}    Wound Care Documentation and Therapy:        Elimination:  Continence:   · Bowel: {YES / EA:17844}  · Bladder: {YES / YK:30074}  Urinary Catheter: {Urinary Catheter:533989899}   Colostomy/Ileostomy/Ileal Conduit: {YES / HC:19486}       Date of Last BM: ***    Intake/Output Summary (Last 24 hours) at 2/5/2021 1326  Last data filed at 2/5/2021 1026  Gross per 24 hour   Intake 240 ml   Output 1925 ml   Net -1685 ml     I/O last 3 completed shifts:  In: -   Out: 1500 [Urine:1500]    Safety Concerns:     [employer] Safety Concerns:499695845}    Impairments/Disabilities:      [employer] Impairments/Disabilities:739259624}    Nutrition Therapy:  Current Nutrition Therapy:   [employer] Diet List:597210475}    Routes of Feeding: {[facility] DME Other Feedings:591035120}  Liquids: {Slp liquid thickness:91972}  Daily Fluid Restriction: {CHP DME Yes amt example:269145631}  Last Modified Barium Swallow with Video (Video Swallowing Test): {Done Not Done BNOU:255973677}    Treatments at the Time of Hospital Discharge:   Respiratory Treatments: ***  Oxygen Therapy:  {Therapy; copd oxygen:54911}  Ventilator:    { CC Vent GBLA:600350360}    Rehab Therapies: {THERAPEUTIC INTERVENTION:0749608398}  Weight Bearing Status/Restrictions: {[facility] Weight Bearin}  Other Medical Equipment (for information only, NOT a DME order):  {EQUIPMENT:394708889}  Other Treatments: ***    Patient's personal belongings (please select all that are sent with patient):  {[facility] DME Belongings:334892906}    RN SIGNATURE:  {Esignature:925491467}    CASE MANAGEMENT/SOCIAL WORK SECTION    Inpatient Status Date: ***    Readmission Risk Assessment Score:  Readmission Risk              Risk of Unplanned Readmission:        6           Discharging to Facility/ Agency   · Name:   · Address:  · Phone:  · Fax:    Dialysis Facility (if applicable)   · Name:  · Address:  · Dialysis Schedule:  · Phone:  · Fax:    / signature: {Esignature:447077861}    PHYSICIAN SECTION    Prognosis: {Prognosis:0769284992}    Condition at Discharge: [address] [place] [address] Patient Condition:047702930}    Rehab Potential (if transferring to Rehab): {Prognosis:9380444969}    Recommended Labs or Other Treatments After Discharge: ***    Physician Certification: I certify the above information and transfer of Clement Nicole  is necessary for the continuing treatment of the diagnosis listed and that she requires {Admit to Appropriate Level of Care:18656} for {GREATER/LESS:846053377} 30 days.      Update Admission H&P: {CHP DME Changes in UVQTD:636653497}    PHYSICIAN SIGNATURE:  {Esignature:930433261}

## 2021-02-05 NOTE — PROGRESS NOTES
Infectious Disease Follow up Notes    CC :  Pelvic abscess     Antibiotics:   Zosyn 3.375 q8  Fluconazole 200mg po daily - declined     Admit Date:   2/1/2021  Hospital Day: 5    Subjective:   She remains afebrile  Very little output from RUPAL in the last 24 hours. Small BM. Diet advancing. She thinks the fluconazole made her feel bloated, \"spacey,\" and nauseated and doesn't want to take any more. Otherwise no new concerns. Objective:     Patient Vitals for the past 8 hrs:   BP Temp Temp src Pulse Resp SpO2 Weight   02/05/21 0745 112/64 97.1 °F (36.2 °C) Oral 75 16 98 % --   02/05/21 0357 126/69 97.1 °F (36.2 °C) Oral 69 16 97 % --   02/05/21 0308 -- -- -- -- -- -- 131 lb 6.4 oz (59.6 kg)       EXAM:  General:  Alert, oriented, NAD    HEENT:  NCAT, PERRL, sclera anicteric   No thrush  NECK:   supple      LUNGS:  Non-labored breathing  ABD: Less tender.   Drain in place with scant effluent   EXT: No focal rash          LINE: IV site ok         Scheduled Meds:   sodium chloride flush  10 mL Intravenous 2 times per day    pantoprazole  40 mg Intravenous Daily    piperacillin-tazobactam  3,375 mg Intravenous Q8H       Data Review:    Lab Results   Component Value Date    WBC 4.9 02/05/2021    HGB 11.9 (L) 02/05/2021    HCT 36.4 02/05/2021    MCV 88.3 02/05/2021     02/05/2021     Lab Results   Component Value Date    CREATININE <0.5 (L) 02/05/2021    BUN 3 (L) 02/05/2021     02/05/2021    K 3.2 (L) 02/05/2021     02/05/2021    CO2 27 02/05/2021       Hepatic Function Panel:   Lab Results   Component Value Date    ALKPHOS 175 02/05/2021    ALT 28 02/05/2021    AST 28 02/05/2021    PROT 5.8 02/05/2021    BILITOT 0.7 02/05/2021    BILIDIR 0.4 02/03/2021    IBILI 0.5 02/03/2021    LABALBU 3.0 02/05/2021       Cultures:   2/1       BC x2 NGTD              UA neg  2/2 Abscess culture light growth mixed enteric marisol and Gp F strep; GS 1+ GPC         Radiology Review:  All pertinent images / reports were reviewed as a part of this visit.    CT a/p 2/1/21  Impression   Perforation of diverticulitis, colitis or malignancy involving the mid   sigmoid colon with adjacent 4.6 x 3.3 x 2.7 cm abscess.  Small amount air in   the abscess cavity may either be due to gas forming infection or fistulous   connection to the sigmoid.        Assessment:     Patient Active Problem List    Diagnosis Date Noted    Abscess of sigmoid colon     Perforated sigmoid colon ([address] 02/01/2021    Melanotic stools 02/01/2021    Unintentional weight loss 02/01/2021    Diverticulosis 02/01/2021    Transaminitis 02/01/2021    Asymptomatic varicose veins of right lower extremity 09/18/2019    Stress reaction 09/18/2019    Lung nodule seen on imaging study 08/06/2018    Neck pain 08/06/2018    Mixed hyperlipidemia 08/06/2018    White coat syndrome without diagnosis of hypertension 08/06/2018    Osteopenia of multiple sites 08/06/2018    Family history of breast cancer in mother 08/06/2018    Primary osteoarthritis of both hips 09/18/2017       Admitted with several months abd pain, tenesmus, altered stools, hematochezia, weight loss   Pelvic abscess with abnormal sigmoid, suspected complicated diverticulitis  S/p IR perc drain of pelvic abscess, culture with mixed enteric marisol and strep sp on the stain  Clinically improved; without systemic signs of infection  -continue Zosyn, ok to hold fluconazole    -monitor drain output   -repeat CT in AM planned   -endoscopic eval down the road once recovered       Discussed with patient/family, all questions answered  D/w PRINCE Merritt MD  Phone: [phone]   Fax : [phone]

## 2021-02-05 NOTE — PROGRESS NOTES
In bed, painful with BM, states she needs something to soften the stool. Rates abdominal pain 4-5/10 , has a heat pack on her belly.

## 2021-02-05 NOTE — PROGRESS NOTES
[facility] SURGERY    PATIENT NAME: Juan Butt     TODAY'S DATE: 2/5/2021    CHIEF COMPLAINT: Hungry    INTERVAL HISTORY/HPI:    Pt doing well, pain at drain site. Having loose BMs. Taking clears, hungry for solid food. OBJECTIVE:  VITALS:  /64   Pulse 75   Temp 97.1 °F (36.2 °C) (Oral)   Resp 16   Ht 5' 5\" (1.651 m)   Wt 131 lb 6.4 oz (59.6 kg)   SpO2 98%   BMI 21.87 kg/m²     INTAKE/OUTPUT:    I/O last 3 completed shifts:  In: -   Out: 1500 [Urine:1500]  I/O this shift:  In: 240 [P.O.:240]  Out: -     CONSTITUTIONAL:  awake and alert  LUNGS:  clear to auscultation  ABDOMEN:  normal bowel sounds, soft, non-distended, tenderness noted in the left lower quadrant around perc drain site     Data:  CBC:   Recent Labs     02/03/21  0601 02/04/21  0624 02/05/21  0621   WBC 5.3 4.2 4.9   HGB 11.8* 11.7* 11.9*   HCT 35.7* 35.5* 36.4    177 190     BMP:    Recent Labs     02/03/21  0601 02/04/21  0624    137   K 3.7 3.6    105   CO2 24 23   BUN 9 5*   CREATININE <0.5* <0.5*   GLUCOSE 77 102*     Hepatic:   Recent Labs     02/03/21  0601 02/04/21  0624   AST 15 19   ALT 26 23   BILITOT 0.9 0.7   ALKPHOS 129 139*     Mag:    No results for input(s): MG in the last 72 hours. Phos:   No results for input(s): PHOS in the last 72 hours.    INR:   Recent Labs     02/02/21  0944   INR 1.35*       Component Collected Lab   Body Fluid Culture, Sterile Abnormal  02/02/2021 12:30 PM [facility]   Mixed enteric marisol  Light growth   No further workup    Gram Stain Result Abnormal  02/02/2021 12:30 PM [address] [facility]   1+ WBC's (Polymorphonuclear)   1+ Gram positive cocci    Organism Abnormal  02/02/2021 12:30 PM [address] [email or web address]   Beta Strep Group F    Body Fluid Culture, Sterile 02/02/2021 12:30 PM [address] [facility]   Light growth   Susceptibility testing of penicillin and other beta lactams is   not necessary for beta hemolytic Streptococci since resistant   strains have not been identified. (CLSI M100)            ASSESSMENT AND PLAN:  Presumed diverticulitis w/ abscess s/p perc drain, continues to improve/feel better   - low fiber diet   - OK to stop IVF if OK w/ Int Med   - will get repeat CT tomorrow to assess abscess, drain status. If drain is removed after CT, should be ok to d/c home on PO antibiotics (if OK with ID).       Electronically signed by Starr Stuart MD

## 2021-02-05 NOTE — PROGRESS NOTES
The patient can have stool softener if it is ok with the surgical service. At this time GI service does not have anything to add and will sign off, but will be available if needed. The patient should be given office follow up appointment with us in 4-6 weeks after discharge.

## 2021-02-05 NOTE — CARE COORDINATION
Per conversation with Dr Xi Carpenter at 1300 huddle, pt to have repeat CT scan tomorrow. POTENTIAL d/c tomorrow, pending results of scan. **OF NOTE: provider mentions pt MAY need IV antibiotics at d/c - currently on zosyn. No indication from ID note if pt will require IV meds at discharge. CM team will continue to follow.

## 2021-02-06 ENCOUNTER — APPOINTMENT (OUTPATIENT)
Dept: CT IMAGING | Age: 71
DRG: 377 | End: 2021-02-06
Payer: MEDICARE

## 2021-02-06 LAB
BASOPHILS ABSOLUTE: 0 K/UL (ref 0–0.2)
BASOPHILS RELATIVE PERCENT: 0.2 %
EOSINOPHILS ABSOLUTE: 0 K/UL (ref 0–0.6)
EOSINOPHILS RELATIVE PERCENT: 0.2 %
HCT VFR BLD CALC: 43.8 % (ref 36–48)
HEMOGLOBIN: 14.4 G/DL (ref 12–16)
LYMPHOCYTES ABSOLUTE: 0.9 K/UL (ref 1–5.1)
LYMPHOCYTES RELATIVE PERCENT: 6.5 %
MCH RBC QN AUTO: 29.3 PG (ref 26–34)
MCHC RBC AUTO-ENTMCNC: 32.9 G/DL (ref 31–36)
MCV RBC AUTO: 89.1 FL (ref 80–100)
MONOCYTES ABSOLUTE: 0.6 K/UL (ref 0–1.3)
MONOCYTES RELATIVE PERCENT: 4.2 %
NEUTROPHILS ABSOLUTE: 12.2 K/UL (ref 1.7–7.7)
NEUTROPHILS RELATIVE PERCENT: 88.9 %
PDW BLD-RTO: 14.6 % (ref 12.4–15.4)
PLATELET # BLD: 233 K/UL (ref 135–450)
PMV BLD AUTO: 7.7 FL (ref 5–10.5)
RBC # BLD: 4.92 M/UL (ref 4–5.2)
WBC # BLD: 13.7 K/UL (ref 4–11)

## 2021-02-06 PROCEDURE — C9113 INJ PANTOPRAZOLE SODIUM, VIA: HCPCS | Performed by: HOSPITALIST

## 2021-02-06 PROCEDURE — 1200000000 HC SEMI PRIVATE

## 2021-02-06 PROCEDURE — 99232 SBSQ HOSP IP/OBS MODERATE 35: CPT | Performed by: SURGERY

## 2021-02-06 PROCEDURE — 6360000004 HC RX CONTRAST MEDICATION

## 2021-02-06 PROCEDURE — 74177 CT ABD & PELVIS W/CONTRAST: CPT

## 2021-02-06 PROCEDURE — 6360000002 HC RX W HCPCS: Performed by: INTERNAL MEDICINE

## 2021-02-06 PROCEDURE — 6360000002 HC RX W HCPCS: Performed by: HOSPITALIST

## 2021-02-06 PROCEDURE — 85025 COMPLETE CBC W/AUTO DIFF WBC: CPT

## 2021-02-06 PROCEDURE — 2580000003 HC RX 258: Performed by: HOSPITALIST

## 2021-02-06 PROCEDURE — 6360000004 HC RX CONTRAST MEDICATION: Performed by: INTERNAL MEDICINE

## 2021-02-06 PROCEDURE — 36415 COLL VENOUS BLD VENIPUNCTURE: CPT

## 2021-02-06 RX ADMIN — KETOROLAC TROMETHAMINE 15 MG: 30 INJECTION, SOLUTION INTRAMUSCULAR at 13:18

## 2021-02-06 RX ADMIN — PIPERACILLIN AND TAZOBACTAM 3375 MG: 3; .375 INJECTION, POWDER, FOR SOLUTION INTRAVENOUS at 02:04

## 2021-02-06 RX ADMIN — Medication 10 ML: at 09:08

## 2021-02-06 RX ADMIN — Medication 10 ML: at 05:47

## 2021-02-06 RX ADMIN — IOHEXOL 50 ML: 240 INJECTION, SOLUTION INTRATHECAL; INTRAVASCULAR; INTRAVENOUS; ORAL at 05:21

## 2021-02-06 RX ADMIN — PIPERACILLIN AND TAZOBACTAM 3375 MG: 3; .375 INJECTION, POWDER, FOR SOLUTION INTRAVENOUS at 09:08

## 2021-02-06 RX ADMIN — Medication 10 ML: at 20:41

## 2021-02-06 RX ADMIN — PANTOPRAZOLE SODIUM 40 MG: 40 INJECTION, POWDER, FOR SOLUTION INTRAVENOUS at 09:08

## 2021-02-06 RX ADMIN — KETOROLAC TROMETHAMINE 15 MG: 30 INJECTION, SOLUTION INTRAMUSCULAR at 20:46

## 2021-02-06 RX ADMIN — IOPAMIDOL 75 ML: 755 INJECTION, SOLUTION INTRAVENOUS at 08:27

## 2021-02-06 RX ADMIN — KETOROLAC TROMETHAMINE 15 MG: 30 INJECTION, SOLUTION INTRAMUSCULAR at 05:47

## 2021-02-06 RX ADMIN — PIPERACILLIN AND TAZOBACTAM 3375 MG: 3; .375 INJECTION, POWDER, FOR SOLUTION INTRAVENOUS at 17:32

## 2021-02-06 ASSESSMENT — PAIN SCALES - GENERAL
PAINLEVEL_OUTOF10: 4
PAINLEVEL_OUTOF10: 6
PAINLEVEL_OUTOF10: 5

## 2021-02-06 ASSESSMENT — PAIN DESCRIPTION - PAIN TYPE: TYPE: SURGICAL PAIN

## 2021-02-06 ASSESSMENT — PAIN DESCRIPTION - LOCATION: LOCATION: ABDOMEN

## 2021-02-06 NOTE — PROGRESS NOTES
Pt called Dr. Lester Haley due to her \"keegan drain having a bad smell and stating someone told her it was tied off. She stated she had to call Dr. Lester Haley because Felicia Maravilla called and told staff something smelled and no one wanted to come and help her. \"     Lisa Prescott entered room did not smell anything emptied drain :15ml told and visualized with pt that the drain was working and the tubing was not clogged there is no leakage on the dressing and that everything was fine.  Pt educated to call staff again if someone does not come in a timely manor and that if anything is wrong the staff will page the  If needed

## 2021-02-06 NOTE — PROGRESS NOTES
Assessment complete. Vital signs stable. Plan of care for the day reviewed with patient, all questions answered. No needs expressed at this time. All belongings within reach, call light within reach, bed in the lowest position, non slip socks on when out of bed. Will continue to monitor.

## 2021-02-06 NOTE — PROGRESS NOTES
General sx called this RN, stated that pt had paged MD re:drain. RN in ER w/ new admission, PCA to room. Per PCA, pt reported that someone told pt that drain was \"tied off and it smells. \" Perc drain draining w/o any difficulty. Edu pt to notify RN of questions or problems w/ drain. Edu pt that drainage may smell d/t infection. Pt verbalized understanding.

## 2021-02-06 NOTE — PROGRESS NOTES
Hospitalist Progress Note      PCP: Tyesha Sharp MD    Date of Admission: 2/1/2021    Chief Complaint: Anorexia, abdominal pain, weight loss    Hospital Course: H&P reviewed. Patient admitted with suspected bowel perforation with pelvic abscess, intermittent GI bleed    Subjective:     Reports she did poorly with p.o. contrast she took overnight for CT today. Complains of nausea with no vomiting with minimal abdominal pain at time of eval        Medications:  Reviewed    Infusion Medications     Scheduled Medications    sodium chloride flush  10 mL Intravenous 2 times per day    pantoprazole  40 mg Intravenous Daily    piperacillin-tazobactam  3,375 mg Intravenous Q8H     PRN Meds: iopamidol, ketorolac, sodium chloride flush, potassium chloride **OR** potassium alternative oral replacement **OR** potassium chloride, magnesium sulfate, promethazine **OR** ondansetron, senna, acetaminophen **OR** acetaminophen      Intake/Output Summary (Last 24 hours) at 2/6/2021 0825  Last data filed at 2/6/2021 0515  Gross per 24 hour   Intake 360 ml   Output 275 ml   Net 85 ml       Physical Exam Performed:    /71   Pulse 80   Temp 98 °F (36.7 °C) (Oral)   Resp 16   Ht 5' 5\" (1.651 m)   Wt 130 lb 1.1 oz (59 kg)   SpO2 97%   BMI 21.64 kg/m²     General appearance: No apparent distress, appears stated age and cooperative. HEENT: Pupils equal, round,  Conjunctivae/corneas clear. Neck: Supple, with full range of motion. No jugular venous distention. Trachea midline. Respiratory:  Normal respiratory effort. Clear to auscultation, bilaterally without Rales/Wheezes/Rhonchi. Cardiovascular: Regular rate and rhythm with normal S1/S2 without murmurs, rubs or gallops. Abdomen: Soft, minimal abdominal tenderness with no rebound or guarding, non-distended with + bowel sounds. Percutaneous drain in place with serosanguinous fluid  Musculoskeletal: No clubbing, cyanosis or edema bilaterally.     Skin: Warm and dry  Neurologic: Alert speech clear with no overt facial droop  Psychiatric: Alert and oriented, thought content appropriate, normal insight        Labs:   Recent Labs     02/04/21 0624 02/05/21 0621   WBC 4.2 4.9   HGB 11.7* 11.9*   HCT 35.5* 36.4    190     Recent Labs     02/04/21  0624 02/05/21 0621    139   K 3.6 3.2*    105   CO2 23 27   BUN 5* 3*   CREATININE <0.5* <0.5*   CALCIUM 8.7 8.7     Recent Labs     02/04/21  0624 02/05/21 0621   AST 19 28   ALT 23 28   BILITOT 0.7 0.7   ALKPHOS 139* 175*     No results for input(s): INR in the last 72 hours. No results for input(s): Towana Chidi in the last 72 hours. Urinalysis:      Lab Results   Component Value Date    NITRU Negative 02/01/2021    WBCUA 6-10 07/21/2018    BACTERIA 1+ 07/19/2018    RBCUA 20-50 07/21/2018    BLOODU Negative 02/01/2021    SPECGRAV <=1.005 02/01/2021    GLUCOSEU Negative 02/01/2021    GLUCOSEU Neg 02/03/2010       Radiology:  CT PERITONEAL/RETROPERITONEAL PERC DRAIN   Final Result      XR CHEST PORTABLE   Final Result   No acute process in the chest by radiograph. CT ABDOMEN PELVIS W IV CONTRAST Additional Contrast? None   Final Result   Perforation of diverticulitis, colitis or malignancy involving the mid   sigmoid colon with adjacent 4.6 x 3.3 x 2.7 cm abscess. Small amount air in   the abscess cavity may either be due to gas forming infection or fistulous   connection to the sigmoid. CT ABDOMEN PELVIS W IV CONTRAST    (Results Pending)           Assessment/Plan:        Pelvic abscess: noted on CT with abnormal sigmoid- possibly due to perforated diverticulitis vrs malignancy. CEA was normal.  S/p  percutaneous drain placed by IR. Gen surg, ID assisting. Culture grew mixed enteric marisol and Streptococcus sp. Continue empiric abx per ID. Repeat CT abdomen/pelvis pending        Intermittent melena at home : no overt signs of GIB here.  GI consulted-no plans for endoscopy in near future in setting of perforation. Likely in 4 to 6 weeks after recovery from her abscess. hgb stable. Monitor CBC          Hyperlipidemia: statin held due to elevated LFTs.          GERD- continue PPI      Abnormal LFT: mild,  statin held. ALP remains  Elevated. GI assisting. Monitor LFTs     Leukocytosis:  noted on lab today. Patient afebrile.   Monitor        DVT Prophylaxis: SCDs       Diet: Diet NPO, After Midnight Exceptions are: Ice Chips, Sips of Water with Meds, Popsicles  Code Status: Full Code    PT/OT Eval Status: not indicated at this time     Dispo - when ok with specialists on board        Ana Rosa Canales MD

## 2021-02-06 NOTE — PROGRESS NOTES
RN to room to administer oral contrast for abd CT. Pt first demanded Toradol for pain 4/10. Attempted to edu pt re:pain mgmt and drug administration and tried to offer Tylenol. Pt interrupted RN multiple times, pointed to the white board and yelled, \"I want that one! \" Administered Toradol, see MAR for charting. Pt also asked for a cup to pour 1L oral contrast in to multiple times. Attempted to explain to pt that that gives her multiple opportunities to spill. Pt then screamed, \"Help! Help! I need someone else! I'm not a baby! I just want a cup! You just want to do what you want! I want a cup! \" Gave pt cups, edu to drink her oral contrast and asked pt to not yell about cups. Pt apologized and then stated, \"I can't promise that. \"

## 2021-02-06 NOTE — PROGRESS NOTES
[facility] SURGERY    PATIENT NAME: Zuri Celis     TODAY'S DATE: 2/6/2021    CHIEF COMPLAINT: \"I'm feeling rough\"    INTERVAL HISTORY/HPI:    Pt c/o discomfort after having repeat CT this AM - after taking PO contrast she reports loose BMs. Denies fevers. OBJECTIVE:  VITALS:  /67   Pulse 81   Temp 97.8 °F (36.6 °C) (Oral)   Resp 16   Ht 5' 5\" (1.651 m)   Wt 130 lb 1.1 oz (59 kg)   SpO2 96%   BMI 21.64 kg/m²     INTAKE/OUTPUT:    I/O last 3 completed shifts: In: 360 [P.O.:360]  Out: 450 [Urine:425; Drains:25]  No intake/output data recorded. CONSTITUTIONAL:  awake and alert  LUNGS:     ABDOMEN:   , soft, non-distended, tenderness noted in the suprapubic region around perc drain. Catheter tubing still w/ feculent output     Data:  CBC:   Recent Labs     02/04/21 0624 02/05/21 0621 02/06/21  0936   WBC 4.2 4.9 13.7*   HGB 11.7* 11.9* 14.4   HCT 35.5* 36.4 43.8    190 233     BMP:    Recent Labs     02/04/21 0624 02/05/21  0621    139   K 3.6 3.2*    105   CO2 23 27   BUN 5* 3*   CREATININE <0.5* <0.5*   GLUCOSE 102* 146*     Hepatic:   Recent Labs     02/04/21 0624 02/05/21  0621   AST 19 28   ALT 23 28   BILITOT 0.7 0.7   ALKPHOS 139* 175*     Mag:    No results for input(s): MG in the last 72 hours. Phos:   No results for input(s): PHOS in the last 72 hours. INR: No results for input(s): INR in the last 72 hours. Radiology Review:    1. Interval placement of a pigtail drainage catheter within the pelvis, with   interval decrease in size of a pericolonic abscess, and a mild residual   abscess remaining. 2. Persistent circumferential wall thickening and submucosal edema involving   the sigmoid colon, which could represent sigmoid diverticulitis, a focal   colitis, or sigmoid malignancy. 3. New mild amount of free pelvic fluid, without new walled-off fluid   collection or abscess           ASSESSMENT AND PLAN:  Pelvic abscess s/p percutaneous drain.

## 2021-02-07 LAB
A/G RATIO: 1 (ref 1.1–2.2)
ALBUMIN SERPL-MCNC: 3.1 G/DL (ref 3.4–5)
ALP BLD-CCNC: 232 U/L (ref 40–129)
ALT SERPL-CCNC: 33 U/L (ref 10–40)
ANION GAP SERPL CALCULATED.3IONS-SCNC: 12 MMOL/L (ref 3–16)
AST SERPL-CCNC: 25 U/L (ref 15–37)
BASOPHILS ABSOLUTE: 0 K/UL (ref 0–0.2)
BASOPHILS RELATIVE PERCENT: 0.3 %
BILIRUB SERPL-MCNC: 0.8 MG/DL (ref 0–1)
BUN BLDV-MCNC: 6 MG/DL (ref 7–20)
CALCIUM SERPL-MCNC: 8.7 MG/DL (ref 8.3–10.6)
CHLORIDE BLD-SCNC: 100 MMOL/L (ref 99–110)
CO2: 25 MMOL/L (ref 21–32)
CREAT SERPL-MCNC: <0.5 MG/DL (ref 0.6–1.2)
EOSINOPHILS ABSOLUTE: 0 K/UL (ref 0–0.6)
EOSINOPHILS RELATIVE PERCENT: 0.6 %
GFR AFRICAN AMERICAN: >60
GFR NON-AFRICAN AMERICAN: >60
GLOBULIN: 3 G/DL
GLUCOSE BLD-MCNC: 127 MG/DL (ref 70–99)
HCT VFR BLD CALC: 36.7 % (ref 36–48)
HEMOGLOBIN: 12.2 G/DL (ref 12–16)
LYMPHOCYTES ABSOLUTE: 0.6 K/UL (ref 1–5.1)
LYMPHOCYTES RELATIVE PERCENT: 10.9 %
MCH RBC QN AUTO: 29.1 PG (ref 26–34)
MCHC RBC AUTO-ENTMCNC: 33.4 G/DL (ref 31–36)
MCV RBC AUTO: 87.2 FL (ref 80–100)
MONOCYTES ABSOLUTE: 0.3 K/UL (ref 0–1.3)
MONOCYTES RELATIVE PERCENT: 4.7 %
NEUTROPHILS ABSOLUTE: 4.5 K/UL (ref 1.7–7.7)
NEUTROPHILS RELATIVE PERCENT: 83.5 %
PDW BLD-RTO: 14.6 % (ref 12.4–15.4)
PLATELET # BLD: 195 K/UL (ref 135–450)
PMV BLD AUTO: 7.8 FL (ref 5–10.5)
POTASSIUM SERPL-SCNC: 3.1 MMOL/L (ref 3.5–5.1)
RBC # BLD: 4.2 M/UL (ref 4–5.2)
SODIUM BLD-SCNC: 137 MMOL/L (ref 136–145)
TOTAL PROTEIN: 6.1 G/DL (ref 6.4–8.2)
WBC # BLD: 5.5 K/UL (ref 4–11)

## 2021-02-07 PROCEDURE — 80053 COMPREHEN METABOLIC PANEL: CPT

## 2021-02-07 PROCEDURE — 6370000000 HC RX 637 (ALT 250 FOR IP): Performed by: HOSPITALIST

## 2021-02-07 PROCEDURE — C9113 INJ PANTOPRAZOLE SODIUM, VIA: HCPCS | Performed by: HOSPITALIST

## 2021-02-07 PROCEDURE — 36415 COLL VENOUS BLD VENIPUNCTURE: CPT

## 2021-02-07 PROCEDURE — 2580000003 HC RX 258: Performed by: HOSPITALIST

## 2021-02-07 PROCEDURE — 85025 COMPLETE CBC W/AUTO DIFF WBC: CPT

## 2021-02-07 PROCEDURE — 99232 SBSQ HOSP IP/OBS MODERATE 35: CPT | Performed by: SURGERY

## 2021-02-07 PROCEDURE — 6360000002 HC RX W HCPCS: Performed by: INTERNAL MEDICINE

## 2021-02-07 PROCEDURE — 6360000002 HC RX W HCPCS: Performed by: HOSPITALIST

## 2021-02-07 PROCEDURE — 1200000000 HC SEMI PRIVATE

## 2021-02-07 RX ADMIN — PANTOPRAZOLE SODIUM 40 MG: 40 INJECTION, POWDER, FOR SOLUTION INTRAVENOUS at 08:35

## 2021-02-07 RX ADMIN — PIPERACILLIN AND TAZOBACTAM 3375 MG: 3; .375 INJECTION, POWDER, FOR SOLUTION INTRAVENOUS at 16:32

## 2021-02-07 RX ADMIN — Medication 10 ML: at 08:35

## 2021-02-07 RX ADMIN — Medication 10 ML: at 21:28

## 2021-02-07 RX ADMIN — PIPERACILLIN AND TAZOBACTAM 3375 MG: 3; .375 INJECTION, POWDER, FOR SOLUTION INTRAVENOUS at 08:35

## 2021-02-07 RX ADMIN — ACETAMINOPHEN 650 MG: 325 TABLET ORAL at 15:44

## 2021-02-07 RX ADMIN — PIPERACILLIN AND TAZOBACTAM 3375 MG: 3; .375 INJECTION, POWDER, FOR SOLUTION INTRAVENOUS at 01:44

## 2021-02-07 RX ADMIN — KETOROLAC TROMETHAMINE 15 MG: 30 INJECTION, SOLUTION INTRAMUSCULAR at 11:00

## 2021-02-07 RX ADMIN — POTASSIUM BICARBONATE 40 MEQ: 782 TABLET, EFFERVESCENT ORAL at 11:12

## 2021-02-07 ASSESSMENT — PAIN SCALES - GENERAL
PAINLEVEL_OUTOF10: 3
PAINLEVEL_OUTOF10: 0
PAINLEVEL_OUTOF10: 3

## 2021-02-07 NOTE — PROGRESS NOTES
Hospitalist Progress Note      PCP: Florance Aschoff, MD    Date of Admission: 2/1/2021    Chief Complaint: Anorexia, abdominal pain, weight loss    Hospital Course: H&P reviewed. Patient admitted with suspected bowel perforation with pelvic abscess, intermittent GI bleed at home     Subjective:       Remains about the same with nausea and denies any vomiting. Intermittent abdominal pain with no fever or chills      Medications:  Reviewed    Infusion Medications     Scheduled Medications    sodium chloride flush  10 mL Intravenous 2 times per day    pantoprazole  40 mg Intravenous Daily    piperacillin-tazobactam  3,375 mg Intravenous Q8H     PRN Meds: ketorolac, sodium chloride flush, potassium chloride **OR** potassium alternative oral replacement **OR** potassium chloride, magnesium sulfate, promethazine **OR** ondansetron, senna, acetaminophen **OR** acetaminophen      Intake/Output Summary (Last 24 hours) at 2/7/2021 0910  Last data filed at 2/7/2021 0542  Gross per 24 hour   Intake 580 ml   Output 285 ml   Net 295 ml       Physical Exam Performed:    /63   Pulse 72   Temp 98 °F (36.7 °C) (Oral)   Resp 16   Ht 5' 5\" (1.651 m)   Wt 129 lb 8 oz (58.7 kg)   SpO2 98%   BMI 21.55 kg/m²     General appearance: No apparent distress, appears stated age and cooperative. HEENT: Pupils equal, round,  Conjunctivae/corneas clear. Neck: Supple, with full range of motion. No jugular venous distention. Trachea midline. Respiratory:  Normal respiratory effort. Clear to auscultation, bilaterally without Rales/Wheezes/Rhonchi. Cardiovascular: Regular rate and rhythm with normal S1/S2 without murmurs, rubs or gallops. Abdomen: Soft, minimal abdominal tenderness with no rebound or guarding, non-distended with + bowel sounds. Percutaneous drain in place with purulent fluid  Musculoskeletal: No clubbing, cyanosis or edema bilaterally.     Skin: Warm and dry  Neurologic: Alert speech clear with no overt perforated diverticulitis vrs malignancy. CEA was normal.  S/p  percutaneous drain placed by IR. Gen surg, ID assisting. Culture grew mixed enteric marisol and Streptococcus sp. Repeat CT showed decreased size of abscess. Continue empiric abx per ID. Intermittent melena at home : no overt signs of GIB here. GI consulted-no plans for endoscopy in near future in setting of perforation. Likely in 4 to 6 weeks after recovery from her abscess. hgb improved. Monitor CBC          Hyperlipidemia: statin held due to abnormal LFTs.          GERD- continue PPI      Abnormal LFT: mild,  statin held. ALP remains elevated. GI assisting. Monitor LFTs     Leukocytosis: Resolved on labs today. Patient afebrile. Monitor        DVT Prophylaxis: SCDs       Diet: DIET CLEAR LIQUID;  Code Status: Full Code    PT/OT Eval Status: not indicated at this time     Dispo -pending clinical course.   Likely when ok with specialists on board        Chon Dumont MD

## 2021-02-07 NOTE — PROGRESS NOTES
[facility] SURGERY    PATIENT NAME: Vin Larios     TODAY'S DATE: 2/7/2021    CHIEF COMPLAINT: none    INTERVAL HISTORY/HPI:    Pt feeling better today, minimal discomfort at catheter. No fever, nausea. Had 1 BM yesterday. OBJECTIVE:  VITALS:  /63   Pulse 72   Temp 98 °F (36.7 °C) (Oral)   Resp 16   Ht 5' 5\" (1.651 m)   Wt 129 lb 8 oz (58.7 kg)   SpO2 98%   BMI 21.55 kg/m²     INTAKE/OUTPUT:    I/O last 3 completed shifts: In: 80 [P.O.:530; IV Piggyback:50]  Out: 285 [Urine:200; Drains:85]  No intake/output data recorded. CONSTITUTIONAL:  awake and alert  LUNGS:     ABDOMEN:   , soft, non-distended, rebound tenderness present around drain; drain w/ scant feculent output    Data:  CBC:   Recent Labs     02/05/21  0621 02/06/21  0936 02/07/21  1000   WBC 4.9 13.7* 5.5   HGB 11.9* 14.4 12.2   HCT 36.4 43.8 36.7    233 195     BMP:    Recent Labs     02/05/21  0621 02/07/21  1000    137   K 3.2* 3.1*    100   CO2 27 25   BUN 3* 6*   CREATININE <0.5* <0.5*   GLUCOSE 146* 127*     Hepatic:   Recent Labs     02/05/21  0621 02/07/21  1000   AST 28 25   ALT 28 33   BILITOT 0.7 0.8   ALKPHOS 175* 232*     Mag:    No results for input(s): MG in the last 72 hours. Phos:   No results for input(s): PHOS in the last 72 hours. INR: No results for input(s): INR in the last 72 hours. Radiology Review:         ASSESSMENT AND PLAN:  Pelvic abscess s/p percutaneous drain. Improving clinically. CT w/ improved status of abscess yesterday. - would aim for d/c home tomorrow if she continues to improve. Can go home w/ drain in place, empty bulb daily; also w/ daily PO antibiotics. - can then f/u with us in office in 1-2 weeks.    - we will arrange repeat CT as outpt prior to follow up visit, to determine if we can d/c drain in the office.   - will eventually need colonoscopy (usually 4-6 weeks after acute process has resolved:, and we can then discuss possible role for surgery    Electronically signed by Todd Doherty MD

## 2021-02-08 VITALS
RESPIRATION RATE: 16 BRPM | DIASTOLIC BLOOD PRESSURE: 67 MMHG | WEIGHT: 128.6 LBS | HEIGHT: 65 IN | TEMPERATURE: 97.7 F | BODY MASS INDEX: 21.43 KG/M2 | HEART RATE: 78 BPM | SYSTOLIC BLOOD PRESSURE: 119 MMHG | OXYGEN SATURATION: 97 %

## 2021-02-08 LAB
A/G RATIO: 1.3 (ref 1.1–2.2)
ALBUMIN SERPL-MCNC: 3.3 G/DL (ref 3.4–5)
ALP BLD-CCNC: 236 U/L (ref 40–129)
ALT SERPL-CCNC: 28 U/L (ref 10–40)
ANION GAP SERPL CALCULATED.3IONS-SCNC: 9 MMOL/L (ref 3–16)
AST SERPL-CCNC: 19 U/L (ref 15–37)
BASOPHILS ABSOLUTE: 0 K/UL (ref 0–0.2)
BASOPHILS RELATIVE PERCENT: 0.4 %
BILIRUB SERPL-MCNC: 0.8 MG/DL (ref 0–1)
BUN BLDV-MCNC: 6 MG/DL (ref 7–20)
CALCIUM SERPL-MCNC: 8.6 MG/DL (ref 8.3–10.6)
CHLORIDE BLD-SCNC: 104 MMOL/L (ref 99–110)
CO2: 25 MMOL/L (ref 21–32)
CREAT SERPL-MCNC: <0.5 MG/DL (ref 0.6–1.2)
EOSINOPHILS ABSOLUTE: 0.1 K/UL (ref 0–0.6)
EOSINOPHILS RELATIVE PERCENT: 1.6 %
GFR AFRICAN AMERICAN: >60
GFR NON-AFRICAN AMERICAN: >60
GLOBULIN: 2.6 G/DL
GLUCOSE BLD-MCNC: 90 MG/DL (ref 70–99)
HCT VFR BLD CALC: 37.9 % (ref 36–48)
HEMOGLOBIN: 12.4 G/DL (ref 12–16)
LYMPHOCYTES ABSOLUTE: 1 K/UL (ref 1–5.1)
LYMPHOCYTES RELATIVE PERCENT: 23.2 %
MCH RBC QN AUTO: 28.8 PG (ref 26–34)
MCHC RBC AUTO-ENTMCNC: 32.8 G/DL (ref 31–36)
MCV RBC AUTO: 87.5 FL (ref 80–100)
MONOCYTES ABSOLUTE: 0.3 K/UL (ref 0–1.3)
MONOCYTES RELATIVE PERCENT: 5.8 %
NEUTROPHILS ABSOLUTE: 3.1 K/UL (ref 1.7–7.7)
NEUTROPHILS RELATIVE PERCENT: 69 %
PDW BLD-RTO: 14.5 % (ref 12.4–15.4)
PLATELET # BLD: 200 K/UL (ref 135–450)
PMV BLD AUTO: 7.5 FL (ref 5–10.5)
POTASSIUM SERPL-SCNC: 3.5 MMOL/L (ref 3.5–5.1)
RBC # BLD: 4.33 M/UL (ref 4–5.2)
SODIUM BLD-SCNC: 138 MMOL/L (ref 136–145)
TOTAL PROTEIN: 5.9 G/DL (ref 6.4–8.2)
WBC # BLD: 4.5 K/UL (ref 4–11)

## 2021-02-08 PROCEDURE — 6370000000 HC RX 637 (ALT 250 FOR IP): Performed by: HOSPITALIST

## 2021-02-08 PROCEDURE — 6360000002 HC RX W HCPCS: Performed by: HOSPITALIST

## 2021-02-08 PROCEDURE — 36415 COLL VENOUS BLD VENIPUNCTURE: CPT

## 2021-02-08 PROCEDURE — 2580000003 HC RX 258: Performed by: HOSPITALIST

## 2021-02-08 PROCEDURE — 99232 SBSQ HOSP IP/OBS MODERATE 35: CPT | Performed by: INTERNAL MEDICINE

## 2021-02-08 PROCEDURE — 99232 SBSQ HOSP IP/OBS MODERATE 35: CPT | Performed by: SURGERY

## 2021-02-08 PROCEDURE — C9113 INJ PANTOPRAZOLE SODIUM, VIA: HCPCS | Performed by: HOSPITALIST

## 2021-02-08 PROCEDURE — 85025 COMPLETE CBC W/AUTO DIFF WBC: CPT

## 2021-02-08 PROCEDURE — 80053 COMPREHEN METABOLIC PANEL: CPT

## 2021-02-08 RX ORDER — AMOXICILLIN AND CLAVULANATE POTASSIUM 250; 62.5 MG/5ML; MG/5ML
750 POWDER, FOR SUSPENSION ORAL 2 TIMES DAILY
Qty: 420 ML | Refills: 0 | Status: SHIPPED
Start: 2021-02-08 | End: 2021-02-10 | Stop reason: SINTOL

## 2021-02-08 RX ADMIN — PIPERACILLIN AND TAZOBACTAM 3375 MG: 3; .375 INJECTION, POWDER, FOR SOLUTION INTRAVENOUS at 08:45

## 2021-02-08 RX ADMIN — Medication 10 ML: at 08:45

## 2021-02-08 RX ADMIN — ACETAMINOPHEN 650 MG: 325 TABLET ORAL at 13:41

## 2021-02-08 RX ADMIN — PIPERACILLIN AND TAZOBACTAM 3375 MG: 3; .375 INJECTION, POWDER, FOR SOLUTION INTRAVENOUS at 01:10

## 2021-02-08 RX ADMIN — PANTOPRAZOLE SODIUM 40 MG: 40 INJECTION, POWDER, FOR SOLUTION INTRAVENOUS at 08:45

## 2021-02-08 ASSESSMENT — PAIN SCALES - GENERAL
PAINLEVEL_OUTOF10: 3
PAINLEVEL_OUTOF10: 0

## 2021-02-08 NOTE — CARE COORDINATION
Per conversation with primary nurse at [facility] huddle, pt likely to discharge home today without needs. Please consult CM team if needs arise.

## 2021-02-08 NOTE — PROGRESS NOTES
Pt ok for discharge per MD. Discharge instructions and script given. IV removed without complication. No questions or concerns at this time. Transported by wheelchair to personal car.

## 2021-02-08 NOTE — PROGRESS NOTES
MD notified about pt & NP question of if ok progress diet prior to DC. Pt currently on Liquid diet, tolerated well today is having frequent bowel small movements. Pt also qustioning what is ok to eat at time of DC.

## 2021-02-08 NOTE — PROGRESS NOTES
-Discharge meds changes :    -pt. was discharged on augmentin suspension instead of tablets per p[t.'s request . Since suspension is not covered by pt.'s insurance , checking with Dr. Flaquito Lacy if we can switch to tablet . Rx was written for 250mg/5mg augmentin - take 15ml ( 750mg ) bid X14 days, tablet formulation is available in 2520, 500 and 875mg, therefore , we have to clarify dose with MD in order to change to tablet form . Mirtha Dong. 2/8/21 6:03 PM EST     - okay to change to  875mng bid X 14 days per Dr. Flaquito Lacy.    Mirtha Diaz/French. 2/8/21 6:04 PM EST

## 2021-02-08 NOTE — PROGRESS NOTES
Comprehensive Nutrition Assessment    Type and Reason for Visit:  Initial, RD Nutrition Re-Screen/LOS    Nutrition Recommendations/Plan:   1. Continue low fiber diet   2. Add Ensure TID   3. Encourage PO intakes   4. Monitor further diet education needs   5. Monitor nutrition adequacy, pertinent labs, bowel habits, wt changes, and clinical progress    Nutrition Assessment:  Pt admitted with pelvic abscess, s/p perc drain placement. Pt nutritionally compromised AEB poor PO intakes this admission. Previously NPO or liquid diet this admission. Diet advanced to low fiber today. No solid foods consumed yet today, but pt reports tolerating full liquids well. Discussed low fiber diet, pt verablized understanding. Will continue to monitor for further nutritional interventions. Malnutrition Assessment:  Malnutrition Status: At risk for malnutrition (Comment)      Estimated Daily Nutrient Needs:  Energy (kcal):  8148-1651 kcal; Weight Used for Energy Requirements:  Current(59 kg)     Protein (g):  59-71 g; Weight Used for Protein Requirements:  Current(1.0-1.2 g/kg)        Fluid (ml/day):   ; Method Used for Fluid Requirements:  1 ml/kcal      Nutrition Related Findings:  Active BS. Last BM 2/8. Wounds:  None       Current Nutrition Therapies:    DIET LOW FIBER;     Anthropometric Measures:  · Height: 5' 5\" (165.1 cm)  · Current Body Weight: 128 lb (58.1 kg)   · Admission Body Weight: 126 lb (57.2 kg)(bed scale)    · Ideal Body Weight: 125 lbs; % Ideal Body Weight 102.4 %   · BMI: 21.3  · BMI Categories: Underweight (BMI less than 22) age over 72       Nutrition Diagnosis:   · Inadequate oral intake related to altered GI function as evidenced by NPO or clear liquid status due to medical condition, intake 26-50%      Nutrition Interventions:   Food and/or Nutrient Delivery:  Continue Current Diet, Start Oral Nutrition Supplement  Nutrition Education/Counseling:  Education initiated   Coordination of Nutrition Care: Continue to monitor while inpatient    Goals:  Pt will consume greater than 50% of meals and ONS this admissin w/o GI distress       Nutrition Monitoring and Evaluation:   Behavioral-Environmental Outcomes:  Knowledge or Skill   Food/Nutrient Intake Outcomes:  Food and Nutrient Intake, Supplement Intake, Diet Advancement/Tolerance  Physical Signs/Symptoms Outcomes:  GI Status, Weight     Discharge Planning:    Continue current diet, Continue Oral Nutrition Supplement     Electronically signed by Brooke Hankins MS, RD, LD on 2/8/21 at 2:28 PM EST    Contact: [phone]

## 2021-02-08 NOTE — PROGRESS NOTES
Pt reports to writer that she does not want to eat tomorrow so that she will not \"poop all the way home\".

## 2021-02-08 NOTE — DISCHARGE SUMMARY
Hospital Medicine Discharge Summary    Patient ID: Rehana Norris      Patient's PCP: Tracie Niño MD    Admit Date: 2/1/2021     Discharge Date: 2/8/2021      Admitting Physician: Willma Leyden, MD     Discharge Physician: [address] [place] [address], APRN - CNP     Discharge Diagnoses: Active Hospital Problems    Diagnosis    Abscess of sigmoid colon [K63.0]    Perforation of sigmoid colon (HCC) [K63.1]    Melanotic stools [K92.1]    Unintentional weight loss [R63.4]    Diverticulosis [K57.90]    Transaminitis [R74.01]    Mixed hyperlipidemia [E78.2]       The patient was seen and examined on day of discharge and this discharge summary is in conjunction with any daily progress note from day of discharge. Hospital Course:   Rehana Norris is a 72 y.o. female who presented to the ED complaining of anorexia, unexpected weight loss, abdominal pain, and melanotic stools ongoing for greater than 3 months duration. She reports that she failed to contact her PCP or seek care prior to today due to concerns of catching Covid19. Upon further questioning the patient also endorses change in her caliber of stool and reports that she has never undergone screening colonoscopy. Pt states that she has felt generally unwell for nearly a year and self diagnosed herself with recurrent Covid infection without ever being tested. Upon arrival to the ED, labs were obtained and notable for transaminitis and alk phos elevation. CT of the abdomen and pelvis revealed perforation of mid sigmoid colon with adjacent abscess. Possible etiologies include diverticular rupture, colitis, or underlying malignancy. Pt was initiated on IV Zosyn in ED prior to collection of blood cultures. Abdominal pain, altered stools, hematochezia, weight loss for several months found to have pelvic abscess (clinically improving):  - CT showed pelvic abscess with abnormal sigmoid with suspected complicated diverticulitis.  Pt was started on IV abx on admission. Pt is s/p IR perc drain of pelvic abscess. Culture grew mixed enteric marisol and strep sp on the stain. Plan for 2 week course of PO augmentin per ID.   - General surgery consulted. Pt to follow-up as an outpt in 7-10 days after getting outpt CT to see if drain can be removed or not, then get colonoscopy, then plan for operation.  - CEA was normal.      Intermittent melena at home:  - No overt signs of GIB while inpt. GI consulted, no plans for endoscopy in near future in setting of perforation. Likely in 4 to 6 weeks after recovery from her abscess. Hgb improved and is stable at 12. 4.     Hyperlipidemia:   - Statin held due to abnormal LFTs.      GERD:  - Continue PPI.     Abnormal LFT (resolved):   - Mild. ALP remains elevated.        Physical Exam Performed:     /67   Pulse 78   Temp 97.7 °F (36.5 °C) (Axillary)   Resp 16   Ht 5' 5\" (1.651 m)   Wt 128 lb 9.6 oz (58.3 kg)   SpO2 97%   BMI 21.40 kg/m²       General appearance:  No apparent distress, appears stated age and cooperative. HEENT:  Normal cephalic, atraumatic without obvious deformity. Pupils equal, round, and reactive to light. Extra ocular muscles intact. Conjunctivae/corneas clear. Neck: Supple, with full range of motion. No jugular venous distention. Trachea midline. Respiratory:  Normal respiratory effort. Clear to auscultation, bilaterally without Rales/Wheezes/Rhonchi. Cardiovascular:  Regular rate and rhythm with normal S1/S2 without murmurs, rubs or gallops. Abdomen: Soft, non-tender, non-distended with normal bowel sounds. Perc drain in place. Musculoskeletal:  No clubbing, cyanosis or edema bilaterally. Full range of motion without deformity. Skin: Skin color, texture, turgor normal.  No rashes or lesions. Neurologic:  Neurovascularly intact without any focal sensory/motor deficits.  Cranial nerves: II-XII intact, grossly non-focal.  Psychiatric:  Alert and oriented, thought content appropriate, normal insight  Capillary Refill: Brisk,< 3 seconds   Peripheral Pulses: +2 palpable, equal bilaterally       Labs: For convenience and continuity at follow-up the following most recent labs are provided:      CBC:    Lab Results   Component Value Date    WBC 4.5 02/08/2021    HGB 12.4 02/08/2021    HCT 37.9 02/08/2021     02/08/2021       Renal:    Lab Results   Component Value Date     02/08/2021    K 3.5 02/08/2021    K 3.6 02/02/2021     02/08/2021    CO2 25 02/08/2021    BUN 6 02/08/2021    CREATININE <0.5 02/08/2021    CALCIUM 8.6 02/08/2021         Significant Diagnostic Studies    Radiology:   CT ABDOMEN PELVIS W IV CONTRAST   Final Result   1. Interval placement of a pigtail drainage catheter within the pelvis, with   interval decrease in size of a pericolonic abscess, and a mild residual   abscess remaining. 2. Persistent circumferential wall thickening and submucosal edema involving   the sigmoid colon, which could represent sigmoid diverticulitis, a focal   colitis, or sigmoid malignancy. 3. New mild amount of free pelvic fluid, without evidence of a new walled-off   fluid collection or abscess. CT [facility]   Final Result      XR CHEST PORTABLE   Final Result   No acute process in the chest by radiograph. CT ABDOMEN PELVIS W IV CONTRAST Additional Contrast? None   Final Result   Perforation of diverticulitis, colitis or malignancy involving the mid   sigmoid colon with adjacent 4.6 x 3.3 x 2.7 cm abscess. Small amount air in   the abscess cavity may either be due to gas forming infection or fistulous   connection to the sigmoid.                 Consults:     IP CONSULT TO GENERAL SURGERY  IP CONSULT TO HOSPITALIST  IP CONSULT TO PHARMACY  IP CONSULT TO GI  IP CONSULT TO INFECTIOUS DISEASES    Disposition:  Home     Condition at Discharge: Stable    Discharge Instructions/Follow-up:  Follow-up with PCP, General Surgery, GI     Code Status:  Full Code     Activity: activity as tolerated    Diet: Low fiber diet       Discharge Medications:     Discharge Medication List as of 2/8/2021  1:59 PM           Details   amoxicillin-clavulanate (AUGMENTIN) 250-62.5 MG/5ML suspension Take 15 mLs by mouth 2 times daily for 14 days, Disp-420 mL, R-0Normal             Time Spent on discharge is more than 45 minutes in the examination, evaluation, counseling and review of medications and discharge plan. Signed:    EMA Lynn - CNP   2/8/2021      Thank you Baltazar Marx MD for the opportunity to be involved in this patient's care. If you have any questions or concerns please feel free to contact me at [phone].

## 2021-02-08 NOTE — PROGRESS NOTES
Infectious Disease Follow up Notes    CC :  Pelvic abscess     Antibiotics:   Zosyn 3.375 q8     Admit Date:   2/1/2021  Hospital Day: 8    Subjective:   She remains afebrile  Little output from RUPAL in the last 24 hours  Had a large BM that was uncomfortable but not too painful. Tolerating regular diet     Objective:     Patient Vitals for the past 8 hrs:   BP Temp Temp src Pulse Resp SpO2 Weight   02/08/21 0900 119/67 97.7 °F (36.5 °C) Axillary 78 16 97 % --   02/08/21 6750 -- -- -- -- -- -- 128 lb 9.6 oz (58.3 kg)       EXAM:  General:  Alert, oriented, NAD    HEENT:  NCAT, PERRL, sclera anicteric   No thrush  NECK:   supple      LUNGS:  Non-labored breathing  ABD: Less tender.   Drain in place with purulent effluent   EXT: No focal rash          LINE: IV site ok         Scheduled Meds:   sodium chloride flush  10 mL Intravenous 2 times per day    pantoprazole  40 mg Intravenous Daily    piperacillin-tazobactam  3,375 mg Intravenous Q8H       Data Review:    Lab Results   Component Value Date    WBC 4.5 02/08/2021    HGB 12.4 02/08/2021    HCT 37.9 02/08/2021    MCV 87.5 02/08/2021     02/08/2021     Lab Results   Component Value Date    CREATININE <0.5 (L) 02/08/2021    BUN 6 (L) 02/08/2021     02/08/2021    K 3.5 02/08/2021     02/08/2021    CO2 25 02/08/2021       Hepatic Function Panel:   Lab Results   Component Value Date    ALKPHOS 236 02/08/2021    ALT 28 02/08/2021    AST 19 02/08/2021    PROT 5.9 02/08/2021    BILITOT 0.8 02/08/2021    BILIDIR 0.4 02/03/2021    IBILI 0.5 02/03/2021    LABALBU 3.3 02/08/2021       Cultures:   2/1       BC x2 NGTD              UA neg  2/2 Abscess culture light growth mixed enteric marisol and Gp F strep; GS 1+ GPC         Radiology Review:  All pertinent images / reports were reviewed as a part of this visit.    CT a/p 2/1/21  Impression   Perforation of diverticulitis, colitis or malignancy involving the mid   sigmoid colon with adjacent 4.6 x 3.3 x 2.7 cm abscess.  Small amount air in   the abscess cavity may either be due to gas forming infection or fistulous   connection to the sigmoid.        Assessment:     Patient Active Problem List    Diagnosis Date Noted    Abscess of sigmoid colon     Perforation of sigmoid colon ([facility] [address] 02/01/2021    Melanotic stools 02/01/2021    Unintentional weight loss 02/01/2021    Diverticulosis 02/01/2021    Transaminitis 02/01/2021    Asymptomatic varicose veins of right lower extremity 09/18/2019    Stress reaction 09/18/2019    Lung nodule seen on imaging study 08/06/2018    Neck pain 08/06/2018    Mixed hyperlipidemia 08/06/2018    White coat syndrome without diagnosis of hypertension 08/06/2018    Osteopenia of multiple sites 08/06/2018    Family history of breast cancer in mother 08/06/2018    Primary osteoarthritis of both hips 09/18/2017       Admitted with several months abd pain, tenesmus, altered stools, hematochezia, weight loss   Pelvic abscess with abnormal sigmoid, suspected complicated diverticulitis  S/p IR perc drain of pelvic abscess, culture with mixed enteric marisol and strep sp on the stain  Clinically improved; without systemic signs of infection    -ok for DC from ID standpoint  -would change to Augmentin and plan 2 week course.   Rx sent to her pharmacy  -she can follow-up with me      Discussed with patient/family, all questions answered  D/w PRINCE Rolon MD  Phone: [phone]   Fax : [phone]

## 2021-02-08 NOTE — PROGRESS NOTES
[facility] SURGERY    PATIENT NAME: Betty Mosqueda     TODAY'S DATE: 2/8/2021    CHIEF COMPLAINT: diarrhea    INTERVAL HISTORY/HPI:    Pt with loose bowel movements today, mild pain with bms, no fevers or chills. REVIEW OF SYSTEMS:  Pertinent positives and negatives as per interval history section    OBJECTIVE:  VITALS:  /67   Pulse 78   Temp 97.7 °F (36.5 °C) (Axillary)   Resp 16   Ht 5' 5\" (1.651 m)   Wt 128 lb 9.6 oz (58.3 kg)   SpO2 97%   BMI 21.40 kg/m²     INTAKE/OUTPUT:    I/O last 3 completed shifts:  In: -   Out: 30 [Drains:30]  I/O this shift: In: 700 [P.O.:700]  Out: 300 [Urine:300]    CONSTITUTIONAL:  awake and alert  LUNGS:  Respirations easy and unlabored  CARD:  regular rate  ABDOMEN:  normal bowel sounds, soft, non-distended, tender, perc drain purulent     Data:  CBC:   Recent Labs     02/06/21  0936 02/07/21  1000 02/08/21  0636   WBC 13.7* 5.5 4.5   HGB 14.4 12.2 12.4   HCT 43.8 36.7 37.9    195 200     BMP:    Recent Labs     02/07/21  1000 02/08/21  0636    138   K 3.1* 3.5    104   CO2 25 25   BUN 6* 6*   CREATININE <0.5* <0.5*   GLUCOSE 127* 90     Hepatic:   Recent Labs     02/07/21  1000 02/08/21  0636   AST 25 19   ALT 33 28   BILITOT 0.8 0.8   ALKPHOS 232* 236*     Mag:    No results for input(s): MG in the last 72 hours. Phos:   No results for input(s): PHOS in the last 72 hours. INR: No results for input(s): INR in the last 72 hours. Radiology Review:  *Imaging personally reviewed by me. NA      ASSESSMENT AND PLAN:  71 yo s/p perc drain of pelvic abscess  1. [address] for discharge on oral abx with drain in place  2. Plan f/u in 7-10 days after getting outpatient CT to see if drain can be removed or not. Ideally would have this infectious process resolve, then get a colonoscopy, then plan for operation.      Electronically signed by Dawood Blanco, [address]

## 2021-02-09 ENCOUNTER — TELEPHONE (OUTPATIENT)
Dept: FAMILY MEDICINE CLINIC | Age: 71
End: 2021-02-09

## 2021-02-09 ENCOUNTER — TELEPHONE (OUTPATIENT)
Dept: SURGERY | Age: 71
End: 2021-02-09

## 2021-02-09 NOTE — TELEPHONE ENCOUNTER
Nelda Sadler 45 Transitions Initial Follow Up Call    Outreach made within 2 business days of discharge: Yes    Patient: Martine Howell Patient : 1950   MRN: [id number]  Reason for Admission: There are no discharge diagnoses documented for the most recent discharge. Discharge Date: 21       Spoke with: Patient, refused appt at this time    Discharge department/facility: [facility]    Scheduled appointment with PCP within 7-14 days    Follow Up  No future appointments.     Negin Gutierrez

## 2021-02-10 ENCOUNTER — TELEPHONE (OUTPATIENT)
Dept: INFECTIOUS DISEASES | Age: 71
End: 2021-02-10

## 2021-02-10 RX ORDER — CIPROFLOXACIN 500 MG/1
500 TABLET, FILM COATED ORAL 2 TIMES DAILY
Qty: 20 TABLET | Refills: 0 | Status: SHIPPED | OUTPATIENT
Start: 2021-02-10 | End: 2021-02-11

## 2021-02-10 RX ORDER — METRONIDAZOLE 500 MG/1
500 TABLET ORAL 3 TIMES DAILY
Qty: 30 TABLET | Refills: 0 | Status: ON HOLD | OUTPATIENT
Start: 2021-02-10 | End: 2021-02-27 | Stop reason: HOSPADM

## 2021-02-10 NOTE — TELEPHONE ENCOUNTER
Sorry to hear that    She is being treated for pelvic abscess, mixed enteric marisol with growth of group F strep     Could stop the Augmentin and try cipro and flagyl   Orders entered  Could you please let her know     Can also encourage bland foods or BRAT diet (bananas, rice, applesauce, toast)    Thanks

## 2021-02-10 NOTE — TELEPHONE ENCOUNTER
Patient called to let Dr. Flaquito Lacy know that the Augmentin she was prescribed is making her sick. Nausea, Diarrhea and gas. Complains of liquid diarrhea but she is not vomiting.

## 2021-02-11 RX ORDER — CEPHALEXIN 500 MG/1
500 CAPSULE ORAL 4 TIMES DAILY
Qty: 40 CAPSULE | Refills: 0 | Status: ON HOLD | OUTPATIENT
Start: 2021-02-11 | End: 2021-02-27 | Stop reason: HOSPADM

## 2021-02-11 NOTE — TELEPHONE ENCOUNTER
Patient called reporting she isn't going to take the Cipro but will take the Flagyl. She reports the side effects are \"too dangerous\". Patient is requesting a different mediation.

## 2021-02-11 NOTE — TELEPHONE ENCOUNTER
Called patient   She did not tolerate Augmentin, \"I can't even think right now\"  She refuses to take cipro    She says she is doing well     New Rx cephalexin to take with flagyl sent to her pharmacy

## 2021-02-12 ENCOUNTER — TELEPHONE (OUTPATIENT)
Dept: INFECTIOUS DISEASES | Age: 71
End: 2021-02-12

## 2021-02-12 NOTE — TELEPHONE ENCOUNTER
Pt called stating cephalexin and metronidazole is too much for her. States she has N/V and diarrhea. Patient confirmed she is taking her medications as directed. She would like to know if she can lower the dose. She has not tried any zofran. Also has not tried [employer] since she cant eat anything.      [address], [address] [place] [address] Avenue [address] [phone]

## 2021-02-15 ENCOUNTER — HOSPITAL ENCOUNTER (INPATIENT)
Age: 71
LOS: 12 days | Discharge: NURSING, REHAB OR LONG-TERM CARE | DRG: 853 | End: 2021-02-27
Attending: EMERGENCY MEDICINE | Admitting: INTERNAL MEDICINE
Payer: MEDICARE

## 2021-02-15 ENCOUNTER — APPOINTMENT (OUTPATIENT)
Dept: CT IMAGING | Age: 71
DRG: 853 | End: 2021-02-15
Payer: MEDICARE

## 2021-02-15 DIAGNOSIS — K65.1 INTRA-ABDOMINAL ABSCESS (HCC): Primary | ICD-10-CM

## 2021-02-15 DIAGNOSIS — K63.0 ABSCESS OF SIGMOID COLON: ICD-10-CM

## 2021-02-15 DIAGNOSIS — R53.1 GENERALIZED WEAKNESS: ICD-10-CM

## 2021-02-15 DIAGNOSIS — K57.92 ACUTE DIVERTICULITIS: ICD-10-CM

## 2021-02-15 DIAGNOSIS — Z91.14 NONCOMPLIANCE WITH MEDICATION REGIMEN: ICD-10-CM

## 2021-02-15 LAB
A/G RATIO: 1.2 (ref 1.1–2.2)
ALBUMIN SERPL-MCNC: 3.8 G/DL (ref 3.4–5)
ALP BLD-CCNC: 232 U/L (ref 40–129)
ALT SERPL-CCNC: 22 U/L (ref 10–40)
ANION GAP SERPL CALCULATED.3IONS-SCNC: 13 MMOL/L (ref 3–16)
AST SERPL-CCNC: 42 U/L (ref 15–37)
BASOPHILS ABSOLUTE: 0 K/UL (ref 0–0.2)
BASOPHILS RELATIVE PERCENT: 0.2 %
BILIRUB SERPL-MCNC: 0.8 MG/DL (ref 0–1)
BILIRUBIN URINE: NEGATIVE
BLOOD, URINE: ABNORMAL
BUN BLDV-MCNC: 7 MG/DL (ref 7–20)
CALCIUM SERPL-MCNC: 9.5 MG/DL (ref 8.3–10.6)
CHLORIDE BLD-SCNC: 101 MMOL/L (ref 99–110)
CLARITY: CLEAR
CO2: 24 MMOL/L (ref 21–32)
COLOR: YELLOW
CREAT SERPL-MCNC: <0.5 MG/DL (ref 0.6–1.2)
EOSINOPHILS ABSOLUTE: 0 K/UL (ref 0–0.6)
EOSINOPHILS RELATIVE PERCENT: 0 %
EPITHELIAL CELLS, UA: NORMAL /HPF (ref 0–5)
GFR AFRICAN AMERICAN: >60
GFR NON-AFRICAN AMERICAN: >60
GLOBULIN: 3.1 G/DL
GLUCOSE BLD-MCNC: 171 MG/DL (ref 70–99)
GLUCOSE URINE: NEGATIVE MG/DL
HCT VFR BLD CALC: 43.3 % (ref 36–48)
HEMOGLOBIN: 14 G/DL (ref 12–16)
KETONES, URINE: 15 MG/DL
LACTIC ACID: 1 MMOL/L (ref 0.4–2)
LEUKOCYTE ESTERASE, URINE: NEGATIVE
LIPASE: 23 U/L (ref 13–60)
LYMPHOCYTES ABSOLUTE: 0.7 K/UL (ref 1–5.1)
LYMPHOCYTES RELATIVE PERCENT: 4.3 %
MCH RBC QN AUTO: 28.3 PG (ref 26–34)
MCHC RBC AUTO-ENTMCNC: 32.2 G/DL (ref 31–36)
MCV RBC AUTO: 87.7 FL (ref 80–100)
MICROSCOPIC EXAMINATION: YES
MONOCYTES ABSOLUTE: 0.5 K/UL (ref 0–1.3)
MONOCYTES RELATIVE PERCENT: 3.4 %
NEUTROPHILS ABSOLUTE: 14 K/UL (ref 1.7–7.7)
NEUTROPHILS RELATIVE PERCENT: 92.1 %
NITRITE, URINE: NEGATIVE
PDW BLD-RTO: 15.1 % (ref 12.4–15.4)
PH UA: 7.5 (ref 5–8)
PLATELET # BLD: 300 K/UL (ref 135–450)
PMV BLD AUTO: 7.8 FL (ref 5–10.5)
POTASSIUM SERPL-SCNC: 3.7 MMOL/L (ref 3.5–5.1)
PROTEIN UA: NEGATIVE MG/DL
RBC # BLD: 4.94 M/UL (ref 4–5.2)
RBC UA: NORMAL /HPF (ref 0–4)
SODIUM BLD-SCNC: 138 MMOL/L (ref 136–145)
SPECIFIC GRAVITY UA: 1.01 (ref 1–1.03)
SPECIMEN STATUS: NORMAL
TOTAL PROTEIN: 6.9 G/DL (ref 6.4–8.2)
URINE TYPE: ABNORMAL
UROBILINOGEN, URINE: 0.2 E.U./DL
WBC # BLD: 15.3 K/UL (ref 4–11)
WBC UA: NORMAL /HPF (ref 0–5)

## 2021-02-15 PROCEDURE — 80053 COMPREHEN METABOLIC PANEL: CPT

## 2021-02-15 PROCEDURE — 96375 TX/PRO/DX INJ NEW DRUG ADDON: CPT

## 2021-02-15 PROCEDURE — 83605 ASSAY OF LACTIC ACID: CPT

## 2021-02-15 PROCEDURE — 1200000000 HC SEMI PRIVATE

## 2021-02-15 PROCEDURE — 96374 THER/PROPH/DIAG INJ IV PUSH: CPT

## 2021-02-15 PROCEDURE — 6360000004 HC RX CONTRAST MEDICATION: Performed by: EMERGENCY MEDICINE

## 2021-02-15 PROCEDURE — 2580000003 HC RX 258: Performed by: EMERGENCY MEDICINE

## 2021-02-15 PROCEDURE — 85025 COMPLETE CBC W/AUTO DIFF WBC: CPT

## 2021-02-15 PROCEDURE — 99284 EMERGENCY DEPT VISIT MOD MDM: CPT

## 2021-02-15 PROCEDURE — 87040 BLOOD CULTURE FOR BACTERIA: CPT

## 2021-02-15 PROCEDURE — 83690 ASSAY OF LIPASE: CPT

## 2021-02-15 PROCEDURE — 81001 URINALYSIS AUTO W/SCOPE: CPT

## 2021-02-15 PROCEDURE — 6360000002 HC RX W HCPCS: Performed by: EMERGENCY MEDICINE

## 2021-02-15 PROCEDURE — 74177 CT ABD & PELVIS W/CONTRAST: CPT

## 2021-02-15 RX ORDER — SODIUM CHLORIDE 9 MG/ML
1000 INJECTION, SOLUTION INTRAVENOUS CONTINUOUS
Status: DISCONTINUED | OUTPATIENT
Start: 2021-02-15 | End: 2021-02-20

## 2021-02-15 RX ORDER — ACETAMINOPHEN 325 MG/1
650 TABLET ORAL EVERY 6 HOURS PRN
Status: DISCONTINUED | OUTPATIENT
Start: 2021-02-15 | End: 2021-02-27 | Stop reason: HOSPADM

## 2021-02-15 RX ORDER — PROMETHAZINE HYDROCHLORIDE 25 MG/1
12.5 TABLET ORAL EVERY 6 HOURS PRN
Status: DISCONTINUED | OUTPATIENT
Start: 2021-02-15 | End: 2021-02-16

## 2021-02-15 RX ORDER — FENTANYL CITRATE 50 UG/ML
25 INJECTION, SOLUTION INTRAMUSCULAR; INTRAVENOUS
Status: ACTIVE | OUTPATIENT
Start: 2021-02-15 | End: 2021-02-15

## 2021-02-15 RX ORDER — SODIUM CHLORIDE 0.9 % (FLUSH) 0.9 %
10 SYRINGE (ML) INJECTION PRN
Status: DISCONTINUED | OUTPATIENT
Start: 2021-02-15 | End: 2021-02-27 | Stop reason: HOSPADM

## 2021-02-15 RX ORDER — 0.9 % SODIUM CHLORIDE 0.9 %
1000 INTRAVENOUS SOLUTION INTRAVENOUS ONCE
Status: COMPLETED | OUTPATIENT
Start: 2021-02-15 | End: 2021-02-15

## 2021-02-15 RX ORDER — ONDANSETRON 2 MG/ML
4 INJECTION INTRAMUSCULAR; INTRAVENOUS EVERY 6 HOURS PRN
Status: DISCONTINUED | OUTPATIENT
Start: 2021-02-15 | End: 2021-02-27 | Stop reason: HOSPADM

## 2021-02-15 RX ORDER — ACETAMINOPHEN 650 MG/1
650 SUPPOSITORY RECTAL EVERY 6 HOURS PRN
Status: DISCONTINUED | OUTPATIENT
Start: 2021-02-15 | End: 2021-02-27 | Stop reason: HOSPADM

## 2021-02-15 RX ORDER — FENTANYL CITRATE 50 UG/ML
50 INJECTION, SOLUTION INTRAMUSCULAR; INTRAVENOUS ONCE
Status: COMPLETED | OUTPATIENT
Start: 2021-02-15 | End: 2021-02-15

## 2021-02-15 RX ORDER — SODIUM CHLORIDE 9 MG/ML
1000 INJECTION, SOLUTION INTRAVENOUS ONCE
Status: COMPLETED | OUTPATIENT
Start: 2021-02-15 | End: 2021-02-16

## 2021-02-15 RX ORDER — POTASSIUM CHLORIDE 7.45 MG/ML
10 INJECTION INTRAVENOUS PRN
Status: DISCONTINUED | OUTPATIENT
Start: 2021-02-15 | End: 2021-02-16

## 2021-02-15 RX ORDER — MAGNESIUM SULFATE IN WATER 40 MG/ML
2000 INJECTION, SOLUTION INTRAVENOUS PRN
Status: DISCONTINUED | OUTPATIENT
Start: 2021-02-15 | End: 2021-02-27 | Stop reason: HOSPADM

## 2021-02-15 RX ORDER — FAMOTIDINE 20 MG/1
20 TABLET, FILM COATED ORAL DAILY PRN
Status: DISCONTINUED | OUTPATIENT
Start: 2021-02-15 | End: 2021-02-19

## 2021-02-15 RX ORDER — ONDANSETRON 2 MG/ML
4 INJECTION INTRAMUSCULAR; INTRAVENOUS ONCE
Status: COMPLETED | OUTPATIENT
Start: 2021-02-15 | End: 2021-02-15

## 2021-02-15 RX ORDER — SODIUM CHLORIDE 0.9 % (FLUSH) 0.9 %
10 SYRINGE (ML) INJECTION EVERY 12 HOURS SCHEDULED
Status: DISCONTINUED | OUTPATIENT
Start: 2021-02-15 | End: 2021-02-27 | Stop reason: HOSPADM

## 2021-02-15 RX ADMIN — SODIUM CHLORIDE 1000 ML: 9 INJECTION, SOLUTION INTRAVENOUS at 20:50

## 2021-02-15 RX ADMIN — PIPERACILLIN SODIUM AND TAZOBACTAM SODIUM 4500 MG: 4; .5 INJECTION, POWDER, LYOPHILIZED, FOR SOLUTION INTRAVENOUS at 20:39

## 2021-02-15 RX ADMIN — IOPAMIDOL 75 ML: 755 INJECTION, SOLUTION INTRAVENOUS at 19:38

## 2021-02-15 RX ADMIN — SODIUM CHLORIDE 1000 ML: 9 INJECTION, SOLUTION INTRAVENOUS at 18:44

## 2021-02-15 RX ADMIN — FENTANYL CITRATE 50 MCG: 50 INJECTION, SOLUTION INTRAMUSCULAR; INTRAVENOUS at 19:54

## 2021-02-15 RX ADMIN — ONDANSETRON 4 MG: 2 INJECTION INTRAMUSCULAR; INTRAVENOUS at 19:54

## 2021-02-15 ASSESSMENT — PAIN DESCRIPTION - PAIN TYPE
TYPE: ACUTE PAIN
TYPE: SURGICAL PAIN;ACUTE PAIN

## 2021-02-15 ASSESSMENT — PAIN DESCRIPTION - DESCRIPTORS: DESCRIPTORS: STABBING

## 2021-02-15 ASSESSMENT — PAIN SCALES - GENERAL
PAINLEVEL_OUTOF10: 6
PAINLEVEL_OUTOF10: 5
PAINLEVEL_OUTOF10: 0

## 2021-02-15 NOTE — ED NOTES
Bed: 07  Expected date: 2/15/21  Expected time: 5:51 PM  Means of arrival: NOLA  Comments:  NOLA [address] Henrik Austin RN  02/15/21 0026

## 2021-02-16 ENCOUNTER — TELEPHONE (OUTPATIENT)
Dept: SURGERY | Age: 71
End: 2021-02-16

## 2021-02-16 LAB
A/G RATIO: 1.1 (ref 1.1–2.2)
ALBUMIN SERPL-MCNC: 2.8 G/DL (ref 3.4–5)
ALP BLD-CCNC: 157 U/L (ref 40–129)
ALT SERPL-CCNC: 13 U/L (ref 10–40)
ANION GAP SERPL CALCULATED.3IONS-SCNC: 8 MMOL/L (ref 3–16)
AST SERPL-CCNC: 22 U/L (ref 15–37)
BASOPHILS ABSOLUTE: 0 K/UL (ref 0–0.2)
BASOPHILS RELATIVE PERCENT: 0.4 %
BILIRUB SERPL-MCNC: 0.7 MG/DL (ref 0–1)
BUN BLDV-MCNC: 6 MG/DL (ref 7–20)
CALCIUM SERPL-MCNC: 8.7 MG/DL (ref 8.3–10.6)
CHLORIDE BLD-SCNC: 111 MMOL/L (ref 99–110)
CO2: 24 MMOL/L (ref 21–32)
CREAT SERPL-MCNC: <0.5 MG/DL (ref 0.6–1.2)
EKG ATRIAL RATE: 72 BPM
EKG DIAGNOSIS: NORMAL
EKG P AXIS: 62 DEGREES
EKG P-R INTERVAL: 156 MS
EKG Q-T INTERVAL: 452 MS
EKG QRS DURATION: 76 MS
EKG QTC CALCULATION (BAZETT): 494 MS
EKG R AXIS: 62 DEGREES
EKG T AXIS: 55 DEGREES
EKG VENTRICULAR RATE: 72 BPM
EOSINOPHILS ABSOLUTE: 0 K/UL (ref 0–0.6)
EOSINOPHILS RELATIVE PERCENT: 0.2 %
ESTIMATED AVERAGE GLUCOSE: 102.5 MG/DL
GAMMA GLUTAMYL TRANSFERASE: 208 U/L (ref 5–36)
GFR AFRICAN AMERICAN: >60
GFR NON-AFRICAN AMERICAN: >60
GLOBULIN: 2.5 G/DL
GLUCOSE BLD-MCNC: 142 MG/DL (ref 70–99)
HBA1C MFR BLD: 5.2 %
HCT VFR BLD CALC: 34.9 % (ref 36–48)
HEMOGLOBIN: 11.6 G/DL (ref 12–16)
LYMPHOCYTES ABSOLUTE: 0.8 K/UL (ref 1–5.1)
LYMPHOCYTES RELATIVE PERCENT: 10.5 %
MAGNESIUM: 1.9 MG/DL (ref 1.8–2.4)
MCH RBC QN AUTO: 29.3 PG (ref 26–34)
MCHC RBC AUTO-ENTMCNC: 33.2 G/DL (ref 31–36)
MCV RBC AUTO: 88.3 FL (ref 80–100)
MONOCYTES ABSOLUTE: 0.4 K/UL (ref 0–1.3)
MONOCYTES RELATIVE PERCENT: 5.4 %
NEUTROPHILS ABSOLUTE: 6.5 K/UL (ref 1.7–7.7)
NEUTROPHILS RELATIVE PERCENT: 83.5 %
PDW BLD-RTO: 15.4 % (ref 12.4–15.4)
PLATELET # BLD: 181 K/UL (ref 135–450)
PMV BLD AUTO: 7.6 FL (ref 5–10.5)
POTASSIUM REFLEX MAGNESIUM: 3.2 MMOL/L (ref 3.5–5.1)
RBC # BLD: 3.95 M/UL (ref 4–5.2)
SODIUM BLD-SCNC: 143 MMOL/L (ref 136–145)
TOTAL PROTEIN: 5.3 G/DL (ref 6.4–8.2)
TROPONIN: <0.01 NG/ML
WBC # BLD: 7.7 K/UL (ref 4–11)

## 2021-02-16 PROCEDURE — 80053 COMPREHEN METABOLIC PANEL: CPT

## 2021-02-16 PROCEDURE — 93010 ELECTROCARDIOGRAM REPORT: CPT | Performed by: INTERNAL MEDICINE

## 2021-02-16 PROCEDURE — 85025 COMPLETE CBC W/AUTO DIFF WBC: CPT

## 2021-02-16 PROCEDURE — 6360000002 HC RX W HCPCS: Performed by: INTERNAL MEDICINE

## 2021-02-16 PROCEDURE — 1200000000 HC SEMI PRIVATE

## 2021-02-16 PROCEDURE — 6370000000 HC RX 637 (ALT 250 FOR IP): Performed by: INTERNAL MEDICINE

## 2021-02-16 PROCEDURE — 6370000000 HC RX 637 (ALT 250 FOR IP): Performed by: NURSE PRACTITIONER

## 2021-02-16 PROCEDURE — 99222 1ST HOSP IP/OBS MODERATE 55: CPT | Performed by: INTERNAL MEDICINE

## 2021-02-16 PROCEDURE — 83036 HEMOGLOBIN GLYCOSYLATED A1C: CPT

## 2021-02-16 PROCEDURE — 93005 ELECTROCARDIOGRAM TRACING: CPT | Performed by: INTERNAL MEDICINE

## 2021-02-16 PROCEDURE — 36415 COLL VENOUS BLD VENIPUNCTURE: CPT

## 2021-02-16 PROCEDURE — 82977 ASSAY OF GGT: CPT

## 2021-02-16 PROCEDURE — 2580000003 HC RX 258: Performed by: INTERNAL MEDICINE

## 2021-02-16 PROCEDURE — 99222 1ST HOSP IP/OBS MODERATE 55: CPT | Performed by: SURGERY

## 2021-02-16 PROCEDURE — 6360000002 HC RX W HCPCS: Performed by: NURSE PRACTITIONER

## 2021-02-16 PROCEDURE — 84484 ASSAY OF TROPONIN QUANT: CPT

## 2021-02-16 PROCEDURE — 83735 ASSAY OF MAGNESIUM: CPT

## 2021-02-16 RX ORDER — PROMETHAZINE HYDROCHLORIDE 25 MG/ML
6.25 INJECTION, SOLUTION INTRAMUSCULAR; INTRAVENOUS EVERY 6 HOURS PRN
Status: DISCONTINUED | OUTPATIENT
Start: 2021-02-16 | End: 2021-02-27 | Stop reason: HOSPADM

## 2021-02-16 RX ORDER — POTASSIUM CHLORIDE 7.45 MG/ML
10 INJECTION INTRAVENOUS
Status: COMPLETED | OUTPATIENT
Start: 2021-02-16 | End: 2021-02-16

## 2021-02-16 RX ORDER — SIMETHICONE 80 MG
80 TABLET,CHEWABLE ORAL EVERY 6 HOURS PRN
Status: DISCONTINUED | OUTPATIENT
Start: 2021-02-16 | End: 2021-02-27 | Stop reason: HOSPADM

## 2021-02-16 RX ORDER — MORPHINE SULFATE 2 MG/ML
1 INJECTION, SOLUTION INTRAMUSCULAR; INTRAVENOUS EVERY 4 HOURS PRN
Status: DISCONTINUED | OUTPATIENT
Start: 2021-02-16 | End: 2021-02-16

## 2021-02-16 RX ORDER — KETOROLAC TROMETHAMINE 30 MG/ML
15 INJECTION, SOLUTION INTRAMUSCULAR; INTRAVENOUS ONCE
Status: COMPLETED | OUTPATIENT
Start: 2021-02-16 | End: 2021-02-16

## 2021-02-16 RX ADMIN — ACETAMINOPHEN 650 MG: 325 TABLET, FILM COATED ORAL at 06:30

## 2021-02-16 RX ADMIN — KETOROLAC TROMETHAMINE 15 MG: 30 INJECTION, SOLUTION INTRAMUSCULAR at 16:17

## 2021-02-16 RX ADMIN — PIPERACILLIN SODIUM,TAZOBACTAM SODIUM 4500 MG: 4; .5 INJECTION, POWDER, FOR SOLUTION INTRAVENOUS at 18:23

## 2021-02-16 RX ADMIN — SIMETHICONE 80 MG: 80 TABLET, CHEWABLE ORAL at 16:19

## 2021-02-16 RX ADMIN — POTASSIUM CHLORIDE 10 MEQ: 10 INJECTION, SOLUTION INTRAVENOUS at 12:24

## 2021-02-16 RX ADMIN — ACETAMINOPHEN 650 MG: 325 TABLET, FILM COATED ORAL at 12:31

## 2021-02-16 RX ADMIN — ONDANSETRON 4 MG: 2 INJECTION INTRAMUSCULAR; INTRAVENOUS at 18:38

## 2021-02-16 RX ADMIN — ACETAMINOPHEN 650 MG: 325 TABLET, FILM COATED ORAL at 23:14

## 2021-02-16 RX ADMIN — PIPERACILLIN SODIUM,TAZOBACTAM SODIUM 4500 MG: 4; .5 INJECTION, POWDER, FOR SOLUTION INTRAVENOUS at 05:15

## 2021-02-16 RX ADMIN — SODIUM CHLORIDE 1000 ML: 9 INJECTION, SOLUTION INTRAVENOUS at 00:10

## 2021-02-16 RX ADMIN — ONDANSETRON 4 MG: 2 INJECTION INTRAMUSCULAR; INTRAVENOUS at 09:37

## 2021-02-16 RX ADMIN — POTASSIUM CHLORIDE 10 MEQ: 10 INJECTION, SOLUTION INTRAVENOUS at 14:02

## 2021-02-16 RX ADMIN — POTASSIUM CHLORIDE 10 MEQ: 10 INJECTION, SOLUTION INTRAVENOUS at 16:16

## 2021-02-16 RX ADMIN — POTASSIUM CHLORIDE 10 MEQ: 10 INJECTION, SOLUTION INTRAVENOUS at 10:29

## 2021-02-16 RX ADMIN — ACETAMINOPHEN 650 MG: 325 TABLET, FILM COATED ORAL at 00:27

## 2021-02-16 ASSESSMENT — PAIN SCALES - GENERAL
PAINLEVEL_OUTOF10: 5
PAINLEVEL_OUTOF10: 6

## 2021-02-16 NOTE — CONSULTS
[id number] [facility] nurse consulted for possible colostomy and stoma marking. Crease noted at the umbilicus. Patient states wears her clothing at the umbilicus line. 2 colostomy sites marked: left upper quadrant (ideal location) and right upper with in the rectus muscle. These sites were examined for a flat pouching surface to accomadate placement of ostomy bag and wafer. Pre op teaching completed. Reviewed anatomy and physiology, basic pouching procedures, and maintanence of the colostomy. Will plan to follow post op as indicated.

## 2021-02-16 NOTE — CARE COORDINATION
Referral received to check IV Benefits. Patients benefit information is as follows: Therapy:Zosyn 4.5g Q8  Pt.  Copay:$187/week for Zosyn, per abundio to agency    Electronically signed by Shanika Cha on 2/16/2021 at 12:57 PM   Cell Ph# [phone], Office # [phone]

## 2021-02-16 NOTE — ED NOTES
Pt assisted to [facility] x1. Back to bed without difficulty.      Kindra Loera RN  02/15/21 1956

## 2021-02-16 NOTE — ED PROVIDER NOTES
CHIEF COMPLAINT  Abdominal Pain (had RUPAL drain placed recently after surgery for perf sigmoid colon, today c/o weakness and pain around drain site.)      HISTORY OF PRESENT ILLNESS  Jan Potter is a 72 y.o. female with a history of recent admission due to sigmoid colon perforation with abscess formation status post percutaneous RUPAL drain placement who presents to the ED complaining of generalized weakness and abdominal pain. Since being discharged 1 week ago she reports continued and possibly worsening generalized abdominal pain especially in the vicinity of her drain. She has nausea but no emesis, generalized weakness and fatigue as well as subjective chills. She stopped taking her Keflex and Flagyl 3 days ago because she said it was upsetting her stomach. Patient feels too weak to safely ambulate and states she lives alone. Requesting readmission. No other complaints, modifying factors or associated symptoms. I have reviewed the following from the nursing documentation. Past Medical History:   Diagnosis Date    Acute hemorrhoid     Depression     Elevated blood pressure reading in office with white coat syndrome, without diagnosis of hypertension     Osteoarthritis      Past Surgical History:   Procedure Laterality Date    BREAST ENHANCEMENT SURGERY  8699    silicon injected    CT RETROPERITONEAL PERC DRAIN  2/2/2021    CT RETROPERITONEAL PERC DRAIN 2/2/2021 Husam Garcia MD [facility] CT SCAN    TUBAL LIGATION       Family History   Problem Relation Age of Onset    Cancer Mother      Social History     Socioeconomic History    Marital status:       Spouse name: Not on file    Number of children: 2    Years of education: Not on file    Highest education level: Not on file   Occupational History    Occupation: insurance     Comment: sales, former   Social Needs    Financial resource strain: Not on file    Food insecurity     Worry: Not on file     Inability: Not on file  Transportation needs     Medical: Not on file     Non-medical: Not on file   Tobacco Use    Smoking status: Former Smoker     Packs/day: 1.00     Years: 10.00     Pack years: 10.00     Types: Cigarettes     Quit date: 12     Years since quittin.1    Smokeless tobacco: Never Used   Substance and Sexual Activity    Alcohol use: Yes    Drug use: No    Sexual activity: Never   Lifestyle    Physical activity     Days per week: Not on file     Minutes per session: Not on file    Stress: Not on file   Relationships    Social connections     Talks on phone: Not on file     Gets together: Not on file     Attends Mandaeism service: Not on file     Active member of club or organization: Not on file     Attends meetings of clubs or organizations: Not on file     Relationship status: Not on file    Intimate partner violence     Fear of current or ex partner: Not on file     Emotionally abused: Not on file     Physically abused: Not on file     Forced sexual activity: Not on file   Other Topics Concern    Not on file   Social History Narrative    Not on file     Current Facility-Administered Medications   Medication Dose Route Frequency Provider Last Rate Last Admin    piperacillin-tazobactam (ZOSYN) 4,500 mg in dextrose 5 % 100 mL IVPB (mini-bag)  4,500 mg Intravenous Once [place] Hassan MD         Current Outpatient Medications   Medication Sig Dispense Refill    cephALEXin (KEFLEX) 500 MG capsule Take 1 capsule by mouth 4 times daily for 10 days 40 capsule 0    metroNIDAZOLE (FLAGYL) 500 MG tablet Take 1 tablet by mouth 3 times daily for 10 days 30 tablet 0     Allergies   Allergen Reactions    Codeine Other (See Comments)     Ears ringing    Morphine Other (See Comments)     hallucinations       REVIEW OF SYSTEMS  10 systems reviewed, pertinent positives per HPI otherwise noted to be negative.     PHYSICAL EXAM /64   Pulse 90   Temp 98 °F (36.7 °C) (Oral)   Resp 15   Ht 5' 5\" (1.651 m)   Wt 128 lb (58.1 kg)   SpO2 94%   BMI 21.30 kg/m²   GENERAL APPEARANCE: Awake and alert. Cooperative. Appears to be feeling ill but not acutely toxic. HEAD: Normocephalic. Atraumatic. EYES: PERRL. EOM's grossly intact. ENT: Mucous membranes are moist.   NECK: Supple, trachea midline. HEART: RRR. Normal S1S2, no rubs, gallops, or murmurs noted  LUNGS: Respirations unlabored. CTAB. Good air exchange. No wheezes, rales, or rhonchi. Speaking comfortably in full sentences. ABDOMEN: Soft. Non-distended. Significant generalized lower abdominal tenderness especially in the vicinity of her RUPAL drain. There is foul-smelling drainage at the drain insertion site along with mild erythema of the skin. Voluntary guarding but no rigidity. Normal bowel sounds. EXTREMITIES: No peripheral edema. MAEE. No acute deformities. SKIN: Warm and dry. No acute rashes. NEUROLOGICAL: Alert and oriented X 3. CN II-XII intact. No gross facial drooping. Strength 5/5, sensation intact. PSYCHIATRIC: Normal mood and affect. LABS  I have reviewed all labs for this visit.    Results for orders placed or performed during the hospital encounter of 02/15/21   CBC Auto Differential   Result Value Ref Range    WBC 15.3 (H) 4.0 - 11.0 K/uL    RBC 4.94 4.00 - 5.20 M/uL    Hemoglobin 14.0 12.0 - 16.0 g/dL    Hematocrit 43.3 36.0 - 48.0 %    MCV 87.7 80.0 - 100.0 fL    MCH 28.3 26.0 - 34.0 pg    MCHC 32.2 31.0 - 36.0 g/dL    RDW 15.1 12.4 - 15.4 %    Platelets 841 338 - 105 K/uL    MPV 7.8 5.0 - 10.5 fL    Neutrophils % 92.1 %    Lymphocytes % 4.3 %    Monocytes % 3.4 %    Eosinophils % 0.0 %    Basophils % 0.2 %    Neutrophils Absolute 14.0 (H) 1.7 - 7.7 K/uL    Lymphocytes Absolute 0.7 (L) 1.0 - 5.1 K/uL    Monocytes Absolute 0.5 0.0 - 1.3 K/uL    Eosinophils Absolute 0.0 0.0 - 0.6 K/uL    Basophils Absolute 0.0 0.0 - 0.2 K/uL Comprehensive Metabolic Panel   Result Value Ref Range    Sodium 138 136 - 145 mmol/L    Potassium 3.7 3.5 - 5.1 mmol/L    Chloride 101 99 - 110 mmol/L    CO2 24 21 - 32 mmol/L    Anion Gap 13 3 - 16    Glucose 171 (H) 70 - 99 mg/dL    BUN 7 7 - 20 mg/dL    CREATININE <0.5 (L) 0.6 - 1.2 mg/dL    GFR Non-African American >60 >60    GFR African American >60 >60    Calcium 9.5 8.3 - 10.6 mg/dL    Total Protein 6.9 6.4 - 8.2 g/dL    Albumin 3.8 3.4 - 5.0 g/dL    Albumin/Globulin Ratio 1.2 1.1 - 2.2    Total Bilirubin 0.8 0.0 - 1.0 mg/dL    Alkaline Phosphatase 232 (H) 40 - 129 U/L    ALT 22 10 - 40 U/L    AST 42 (H) 15 - 37 U/L    Globulin 3.1 g/dL   Lactic Acid, Plasma   Result Value Ref Range    Lactic Acid 1.0 0.4 - 2.0 mmol/L   Sample possible blood bank testing   Result Value Ref Range    Specimen Status ERICK    Lipase   Result Value Ref Range    Lipase 23.0 13.0 - 60.0 U/L   Urinalysis   Result Value Ref Range    Color, UA Yellow Straw/Yellow    Clarity, UA Clear Clear    Glucose, Ur Negative Negative mg/dL    Bilirubin Urine Negative Negative    Ketones, Urine 15 (A) Negative mg/dL    Specific Gravity, UA 1.010 1.005 - 1.030    Blood, Urine TRACE-INTACT (A) Negative    pH, UA 7.5 5.0 - 8.0    Protein, UA Negative Negative mg/dL    Urobilinogen, Urine 0.2 <2.0 E.U./dL    Nitrite, Urine Negative Negative    Leukocyte Esterase, Urine Negative Negative    Microscopic Examination YES     Urine Type NotGiven          RADIOLOGY  X-RAYS:  I have reviewed radiologic plain film image(s). ALL OTHER NON-PLAIN FILM IMAGES SUCH AS CT, ULTRASOUND AND MRI HAVE BEEN READ BY THE RADIOLOGIST. CT ABDOMEN PELVIS W IV CONTRAST Additional Contrast? None   Final Result   3.5 x 3.9 x 2.0 cm fluid collection with peripheral enhancement, contacting   the dome of the bladder and sigmoid colon, likely related to abscess or   fistula, increased in size since the prior study. A pigtail drainage catheter with its tip in the lumen of the fluid   collection. Mild infiltration of fat and few air bubbles along the track of   the catheter. Wall thickening at the dome of bladder, may be reactive to the adjacent   abscess or fistula. Wall thickening of the sigmoid colon with mild surrounding infiltration of   fat, likely related to acute diverticulitis, grossly stable. Follow-up is   recommended to exclude neoplasm. Mild dilatation of the appendix, nonspecific, without associated inflammatory   changes. Rechecks: Physical assessment performed. Appears unchanged on reevaluation. Seems generally weak, fatigued, ill but nontoxic. ED COURSE/MDM  Patient seen and evaluated. Old records reviewed. Labs and imaging reviewed and results discussed with patient. Patient with abdominal pain, nausea, generalized weakness. She has been noncompliant with discharge antibiotics. CT scan demonstrates worsening intra-abdominal abscess despite percutaneous RUPAL drain placement. Patient started on IV antibiotics in the emergency department and readmitted to the hospitalist service    New Prescriptions    No medications on file       CLINICAL IMPRESSION  1. Intra-abdominal abscess ([address]    2. Acute diverticulitis    3. Generalized weakness    4. Noncompliance with medication regimen        Blood pressure 126/64, pulse 90, temperature 98 °F (36.7 °C), temperature source Oral, resp. rate 15, height 5' 5\" (1.651 m), weight 128 lb (58.1 kg), SpO2 94 %, not currently breastfeeding. Dusty Tracey was admitted in stable condition.         Be Jane MD  02/15/21 2041

## 2021-02-16 NOTE — H&P
Medications Prior to Admission:      Prior to Admission medications    Medication Sig Start Date End Date Taking? Authorizing Provider   cephALEXin (KEFLEX) 500 MG capsule Take 1 capsule by mouth 4 times daily for 10 days 2/11/21 2/21/21  Tremaine Cavazos MD   metroNIDAZOLE (FLAGYL) 500 MG tablet Take 1 tablet by mouth 3 times daily for 10 days 2/10/21 2/20/21  Tremaine Cavazos MD       Allergies:  Codeine and Morphine    Social History:          TOBACCO:   reports that she quit smoking about 45 years ago. Her smoking use included cigarettes. She has a 10.00 pack-year smoking history. She has never used smokeless tobacco.  ETOH:   reports current alcohol use. E-Cigarettes/Vaping Use     Questions Responses    E-Cigarette/Vaping Use Never User    Start Date     Passive Exposure     Quit Date     Counseling Given     Comments             Family History:      Reviewed in detail         Problem Relation Age of Onset    Cancer Mother        REVIEW OF SYSTEMS:     Constitutional:  No Fever, No Chills, No Night Sweats  ENT/Mouth:  No Nasal Congestion,  No Hoarseness, No new mouth lesion  Eyes:  No Eye Pain, No Redness, No Discharge  Cardiovascular:  No Chest Pain, No Orthopnea, No Palpitations  Respiratory:  No Cough, No Sputum, No Dyspnea  Gastrointestinal: No Vomiting, No Diarrhea, N + abdominal pain  Genitourinary: No Urinary Frequency, No Hematuria, No Urinary pain  Musculoskeletal:  No worsening Arthralgias, No worsening Myalgias  Skin:  No new Skin Lesions, No new skin rash  Neuro:  No new weakness, No new numbness. Psych:  No suicial ideation, No Violence ideation    PHYSICAL EXAM PERFORMED:    BP (!) 113/59   Pulse 80   Temp 98 °F (36.7 °C) (Oral)   Resp 16   Ht 5' 5\" (1.651 m)   Wt 128 lb (58.1 kg)   SpO2 96%   BMI 21.30 kg/m²     General appearance:  mild acute distress, appears older than stated age  HEENT:   atraumatic, sclera anicteric, Conjunctivae clear.   Neck: Supple,Trachea midline, no goiter Respiratory:minimal accessory muscle usage, Normal respiratory effort. Clear to auscultation, bilaterally without wheezing  Cardiovascular:  Regular rate and rhythm, capillary refill 2 seconds  Abdomen: Soft, -tender to palpation without guarding, non-distended with normal bowel sounds. Incision with drain present  Musculoskeletal:  No clubbing, cyanosis. trace edema LE bilaterally. Skin: turgor normal.  No new rashes or lesions. Neurologic: Alert and oriented x4, no new focal sensory/motor deficits. Labs:     Recent Labs     02/15/21  1824   WBC 15.3*   HGB 14.0   HCT 43.3        Recent Labs     02/15/21  1824      K 3.7      CO2 24   BUN 7   CREATININE <0.5*   CALCIUM 9.5     Recent Labs     02/15/21  1824   AST 42*   ALT 22   BILITOT 0.8   ALKPHOS 232*     No results for input(s): INR in the last 72 hours. No results for input(s): Monson Alejandro in the last 72 hours. Urinalysis:      Lab Results   Component Value Date    NITRU Negative 02/15/2021    WBCUA 0-2 02/15/2021    BACTERIA 1+ 07/19/2018    RBCUA 3-4 02/15/2021    BLOODU TRACE-INTACT 02/15/2021    SPECGRAV 1.010 02/15/2021    GLUCOSEU Negative 02/15/2021    GLUCOSEU Neg 02/03/2010       Radiology:     EKG:  I have reviewed the EKG with the following interpretation:   Pending    CT ABDOMEN PELVIS W IV CONTRAST Additional Contrast? None   Final Result   3.5 x 3.9 x 2.0 cm fluid collection with peripheral enhancement, contacting   the dome of the bladder and sigmoid colon, likely related to abscess or   fistula, increased in size since the prior study. A pigtail drainage catheter with its tip in the lumen of the fluid   collection. Mild infiltration of fat and few air bubbles along the track of   the catheter. Wall thickening at the dome of bladder, may be reactive to the adjacent   abscess or fistula.       Wall thickening of the sigmoid colon with mild surrounding infiltration of fat, likely related to acute diverticulitis, grossly stable. Follow-up is   recommended to exclude neoplasm. Mild dilatation of the appendix, nonspecific, without associated inflammatory   changes.              ASSESSMENT AND PLAN:    Active Hospital Problems    Diagnosis Date Noted    Abscess of sigmoid colon [K63.0]      Sepsis: Tachycardia with leukocytosis secondary to complicated sigmoid colon colitis with abscess:  Blood cultures pending  Tylenol for pain, deferred opiates  IV Zosyn    Acute worsening sigmoid colon abscess:  Continue IV Zosyn  ID and surgery consulted    Elevated alk phos:  GGT pending    Hyperglycemia: A1c pending  Chronic medical conditions:  Depression: Continue home medication  White coat syndrome: Continue to monitor BP    Diet: NPO except meds ordered    DVT Prophylaxis: Held    Dispo:   Expected LOS greater than two [address] [facility], DO

## 2021-02-16 NOTE — ED NOTES
Bedside report given to Elbert hale RN. Pt in route to C3 via wheelchair at this time in stable condition with C3 RN. All belongings sent with pt. Care transferred.      Juanjose Quintana RN  02/15/21 0340

## 2021-02-16 NOTE — CARE COORDINATION
CASE MANAGEMENT INITIAL ASSESSMENT      Reviewed chart and completed assessment with:patient  Explained Case Management role/services. Primary contact information:April Rey [phone]    Health Care Decision Maker :     April      Can this person be reached and be able to respond quickly, such as within a few minutes or hours? Yes      Admit date/status:2/15/21  Diagnosis:abscess colon   Is this a Readmission?:  Yes      Insurance:medicare   Precert required for SNF: Yes       3 night stay required: No    Living arrangements, Adls, care needs, prior to admission:lives in apartment with cat. Has support of neighbors    Transportation:tbd     Durable Medical Equipment at home:  Walker_x_Cane__RTS__ BSC__Shower Chair__  02__ HHN__ CPAP__  BiPap__  Hospital Bed__ W/C___ Other__________    Services in the home and/or outpatient, prior to [address] (if applicable)   · Name:  · Address:  · Dialysis Schedule:  · Phone:  · Fax:    PT/OT recs:none    Hospital Exemption Notification (HEN):needed for SNF    Barriers to discharge:none    Plan/comments:spoke with patient. Plans on returning home at discharge. Open to [facility] at discharge. Any agency. Referral to [facility]. Therapy would be helpful in determining needs. CM will continue to follow.  Evelia Schaefer RN      ECOC on chart for MD rendon

## 2021-02-16 NOTE — CONSULTS
Infectious Diseases   Consult Note      Reason for Consult:   Perforated diverticulitis and pelvic abscess   Requesting Physician:  Dr. Sujata Gaspar      Date of Admission: 2/15/2021  Subjective:   CHIEF COMPLAINT:   abd pain       HPI:    Nuha Martinez is a 73yoF with history of hemorrhoids, depression, BTL. Admission 2/2-2/8/21 with pelvic abscess and perforated diverticulitis s/p percutaneous drain placement. She improved with drainage and abx and was discharged home with drain in place on po antibiotic therapy. She did not tolerate Augmentin due to size of the tablet and was changed to cipro/flagyl but was not comfortable with PI/potential SE of cipro, then changed to cephalexin/flagyl on 2/11/21. She stopped abx ~3d prior to admission due to ongoing nausea. Readmitted 2/15/21 with worsening pain, weakness, nausea. CT shows interval enlargement of the abscess. WBC was elevated at 15.3 on admission, today down to 7.7. She is afebrile. Surgery now recommending sigmoidectomy as she seems to have failed conservative therapy. She reports passing gas from the abd drain site. Current abx:  Zosyn 4.5 q8       Past Surgical History:       Diagnosis Date    Acute hemorrhoid     Depression     Elevated blood pressure reading in office with white coat syndrome, without diagnosis of hypertension     Osteoarthritis          Procedure Laterality Date    BREAST ENHANCEMENT SURGERY  8602    silicon injected    CT RETROPERITONEAL PERC DRAIN  2/2/2021    CT RETROPERITONEAL PERC DRAIN 2/2/2021 Torey Loera MD [facility] CT SCAN    TUBAL LIGATION         Social History:    TOBACCO:   reports that she quit smoking about 45 years ago. Her smoking use included cigarettes. She has a 10.00 pack-year smoking history. She has never used smokeless tobacco.  ETOH:   reports previous alcohol use. There is no history of illicit drug use or other significant epidemiologic exposures. Family History:       Problem Relation Age of Onset    Cancer Mother        Current Medications:    Current Facility-Administered Medications: 0.9 % sodium chloride infusion, 1,000 mL, Intravenous, Continuous  sodium chloride flush 0.9 % injection 10 mL, 10 mL, Intravenous, 2 times per day  sodium chloride flush 0.9 % injection 10 mL, 10 mL, Intravenous, PRN  magnesium sulfate 2000 mg in 50 mL IVPB premix, 2,000 mg, Intravenous, PRN  promethazine (PHENERGAN) tablet 12.5 mg, 12.5 mg, Oral, Q6H PRN **OR** ondansetron (ZOFRAN) injection 4 mg, 4 mg, Intravenous, Q6H PRN  famotidine (PEPCID) tablet 20 mg, 20 mg, Oral, Daily PRN  acetaminophen (TYLENOL) tablet 650 mg, 650 mg, Oral, Q6H PRN **OR** acetaminophen (TYLENOL) suppository 650 mg, 650 mg, Rectal, Q6H PRN  piperacillin-tazobactam (ZOSYN) 4,500 mg in dextrose 5 % 100 mL IVPB (mini-bag), 4,500 mg, Intravenous, Q8H      Allergies   Allergen Reactions    Codeine Other (See Comments)     Ears ringing    Morphine Other (See Comments)     hallucinations        REVIEW OF SYSTEMS:    CONSTITUTIONAL:  No F/C immediately prior to admission   EYES:  negative for blurred vision, eye discharge, visual disturbance and icterus  HEENT:  negative for hearing loss, tinnitus, ear drainage, sinus pressure, nasal congestion, epistaxis and snoring  RESPIRATORY:  No cough, shortness of breath, hemoptysis  CARDIOVASCULAR:  negative for chest pain, palpitations, exertional chest pressure/discomfort, edema, syncope  GASTROINTESTINAL:   Per HPI   GENITOURINARY:  negative for frequency, dysuria, urinary incontinence, decreased urine volume, and hematuria  HEMATOLOGIC/LYMPHATIC:  negative for easy bruising, bleeding and lymphadenopathy  ALLERGIC/IMMUNOLOGIC:  negative for recurrent infections, angioedema, anaphylaxis and drug reactions  ENDOCRINE:  negative for weight changes and diabetic symptoms including polyuria, polydipsia and polyphagia MUSCULOSKELETAL:  negative for acute joint swelling, decreased range of motion and muscle weakness  NEUROLOGICAL:  negative for headaches, slurred speech, unilateral weakness  PSYCHIATRIC/BEHAVIORAL: negative for hallucinations, behavioral problems, confusion and agitation. Objective:   PHYSICAL EXAM:      VITALS:  /61   Pulse 71   Temp 97.4 °F (36.3 °C) (Axillary)   Resp 16   Ht 5' 5\" (1.651 m)   Wt 126 lb 14.4 oz (57.6 kg)   SpO2 99%   BMI 21.12 kg/m²      24HR INTAKE/OUTPUT:      Intake/Output Summary (Last 24 hours) at 2/16/2021 1415  Last data filed at 2/16/2021 1006  Gross per 24 hour   Intake 100 ml   Output 210 ml   Net -110 ml     CONSTITUTIONAL:  Awake, alert, cooperative, no apparent distress, and appears stated age  [de-identified]: NCAT, PERRL, EOMI. Sclera white, conjunctiva full. OP with moist mucosal membranes, no thrush, tongue protrudes midline  NECK:  Supple, symmetrical, trachea midline, no adenopathy  LUNGS:  no increased work of breathing, CTA gregory without W/R/R  CARDIOVASCULAR:  RRR without murmur   ABDOMEN:  normal bowel sounds, tender. Feculent drainage at drain exit site  PSYCHIATRIC: Oriented to person place and time. No obvious depression or anxiety. MUSCULOSKELETAL: No obvious misalignment or effusion of the joints. No clubbing, cyanosis of the digits. SKIN:  normal skin color, texture, turgor and no redness, warmth, or swelling.  No palpable nodules or stigmata of embolic phenomenon  NEUROLOGIC: nonfocal exam  ACCESS:   IV site ok       DATA:    Old records have been reviewed    CBC:  Recent Labs     02/15/21  1824 02/16/21  0707   WBC 15.3* 7.7   RBC 4.94 3.95*   HGB 14.0 11.6*   HCT 43.3 34.9*    181   MCV 87.7 88.3   MCH 28.3 29.3   MCHC 32.2 33.2   RDW 15.1 15.4      BMP:  Recent Labs     02/15/21  1824 02/16/21  0707    143   K 3.7 3.2*    111*   CO2 24 24   BUN 7 6*   CREATININE <0.5* <0.5*   CALCIUM 9.5 8.7   GLUCOSE 171* 142*        Cultures:  2/1       BC x2 NGTD              UA neg  2/2      Abscess culture light growth mixed enteric marisol and Gp F strep; GS 1+ GPC         Radiology Review:  All pertinent images / reports were reviewed as a part of this visit.    CT a/p 2/1/21  Impression   Perforation of diverticulitis, colitis or malignancy involving the mid   sigmoid colon with adjacent 4.6 x 3.3 x 2.7 cm abscess.  Small amount air in   the abscess cavity may either be due to gas forming infection or fistulous   connection to the sigmoid.      CT a/p 2/15/21   Impression   3.5 x 3.9 x 2.0 cm fluid collection with peripheral enhancement, contacting   the dome of the bladder and sigmoid colon, likely related to abscess or   fistula, increased in size since the prior study.       A pigtail drainage catheter with its tip in the lumen of the fluid   collection.  Mild infiltration of fat and few air bubbles along the track of   the catheter.       Wall thickening at the dome of bladder, may be reactive to the adjacent   abscess or fistula.       Wall thickening of the sigmoid colon with mild surrounding infiltration of   fat, likely related to acute diverticulitis, grossly stable.  Follow-up is   recommended to exclude neoplasm.       Mild dilatation of the appendix, nonspecific, without associated inflammatory   changes.        Assessment:     Patient Active Problem List   Diagnosis    Primary osteoarthritis of both hips    Lung nodule seen on imaging study    Neck pain    Mixed hyperlipidemia    White coat syndrome without diagnosis of hypertension    Osteopenia of multiple sites    Family history of breast cancer in mother    Asymptomatic varicose veins of right lower extremity    Stress reaction    Perforation of sigmoid colon ([address]    Melanotic stools    Unintentional weight loss    Diverticulosis    Transaminitis    Abscess of sigmoid colon       Rose Loera is a 73yoF who is evaluated for the following: Admitted 2/2/21 with several months abd pain, tenesmus, altered stools, hematochezia, weight loss. Found to have pelvic abscess with abnormal sigmoid, suspected complicated diverticulitis  S/p IR perc drain of pelvic abscess, culture with mixed enteric marisol and strep sp on the stain  Improved with drainage and IV abx and discharged on po Augmentin. She was intolerant of several po abx and stopped them ~3d prior to admission    Readmitted 2/15/21 with increased abd pain, leukocytosis, enlarged pelvic abscess  Plan for definitive surgical debridement, sigmoidectomy noted    -continue Zosyn as ordered     D/w Darya Do M.D. Thank you for the opportunity to participate in the care of your patient.     Please do not hesitate to contact me:   [phone] office  [phone] mobile

## 2021-02-16 NOTE — PROGRESS NOTES
4 Eyes Skin Assessment     The patient is being assess for  Admission    I agree that 2 RN's have performed a thorough Head to Toe Skin Assessment on the patient. ALL assessment sites listed below have been assessed. Areas assessed by both nurses: Dawson RN  [x]   Head, Face, and Ears   [x]   Shoulders, Back, and Chest  [x]   Arms, Elbows, and Hands   [x]   Coccyx, Sacrum, and IschIum  [x]   Legs, Feet, and Heels    -sacrum red and slow to charleen, pillow support, pt refused preventative.   -keegan drain w/dressing and purulent drainage around site. Does the Patient have Skin Breakdown?   No         Dennis Prevention initiated:  Yes   Wound Care Orders initiated:  No      WOC nurse consulted for Pressure Injury (Stage 3,4, Unstageable, DTI, NWPT, and Complex wounds), New and Established Ostomies:  No      Nurse 1 eSignature: Electronically signed by Nena Dejesus RN on 2/16/21 at 1:25 AM EST    **SHARE this note so that the co-signing nurse is able to place an eSignature**    Nurse 2 eSignature: Electronically signed by Hever Figueroa RN on 2/16/21 at 1:29 AM EST

## 2021-02-16 NOTE — ED NOTES
Fall risk screening completed. Fall risk bracelet applied to patient. Non-skid socks provided and placed on patient. The fall risk is indicated using  dome light . Based on score, a bed alarm was indicated and applied. The call light is within the patient's reach, and instructions for use were provided. The bed is in the lowest position with wheels locked. The patient has been advised to notify staff, using the call light, if there is a need to get up or use restroom. The patient verbalized understanding of safety precautions and how to contact staff for assistance.        Gillian Bradshaw RN  02/15/21 2029

## 2021-02-16 NOTE — TELEPHONE ENCOUNTER
Daughter needs to speak with Dr. Renu Lima about mother who is admitted in the hospital and confused.

## 2021-02-16 NOTE — ED NOTES
Pt c/o generalized weakness since being discharged from hospital 3 days ago. States she \"collapsed\". Neighbor has been helping patient with daily needs. Patient states she does not feel safe to go home as she lives alone and is \"too weak\".      Luna Neal RN  02/15/21 1912

## 2021-02-16 NOTE — CARE COORDINATION
[facility]    Referral received from  to follow for home care services. I will follow for needs, and speak with patient to verify demos.     Dorcas De Jesus RN, BSN CTN  [facility] [phone]

## 2021-02-16 NOTE — CONSULTS
Department of General Surgery Consult    PATIENT NAME: Racheal May   YOB: 1950    ADMISSION DATE: 2/15/2021  6:16 PM      TODAY'S DATE: 2/16/2021    Reason for Consult:  Worsening diverticulitis w/ abscess    Chief Complaint: Lower abdominal pain, foul-smelling drainage    Requesting Physician:  Jesus    HISTORY OF PRESENT ILLNESS:              The patient is a 72 y.o. female who presents with known perforated sigmoid diverticulitis w/ abscess, admitted 2 weeks ago and managed with IV abx and perc drain. Seemed to be improving, was sent home ~1 week ago w/ drain still in place. Pt has c/o increasing lower abdominal pain, nausea, weakness and fatigue. Denies fevers but notes occasional chills.     Past Medical History:        Diagnosis Date    Acute hemorrhoid     Depression     Elevated blood pressure reading in office with white coat syndrome, without diagnosis of hypertension     Osteoarthritis        Past Surgical History:        Procedure Laterality Date    BREAST ENHANCEMENT SURGERY  2066    silicon injected    CT RETROPERITONEAL PERC DRAIN  2/2/2021    CT RETROPERITONEAL PERC DRAIN 2/2/2021 Jacquiline Favre, MD [facility] CT SCAN    TUBAL LIGATION         Current Medications:   Current Facility-Administered Medications: potassium chloride 10 mEq/100 mL IVPB (Peripheral Line), 10 mEq, Intravenous, Q1H  0.9 % sodium chloride infusion, 1,000 mL, Intravenous, Continuous  sodium chloride flush 0.9 % injection 10 mL, 10 mL, Intravenous, 2 times per day  sodium chloride flush 0.9 % injection 10 mL, 10 mL, Intravenous, PRN  magnesium sulfate 2000 mg in 50 mL IVPB premix, 2,000 mg, Intravenous, PRN  promethazine (PHENERGAN) tablet 12.5 mg, 12.5 mg, Oral, Q6H PRN **OR** ondansetron (ZOFRAN) injection 4 mg, 4 mg, Intravenous, Q6H PRN  famotidine (PEPCID) tablet 20 mg, 20 mg, Oral, Daily PRN acetaminophen (TYLENOL) tablet 650 mg, 650 mg, Oral, Q6H PRN **OR** acetaminophen (TYLENOL) suppository 650 mg, 650 mg, Rectal, Q6H PRN  piperacillin-tazobactam (ZOSYN) 4,500 mg in dextrose 5 % 100 mL IVPB (mini-bag), 4,500 mg, Intravenous, Q8H  Prior to Admission medications    Medication Sig Start Date End Date Taking? Authorizing Provider   cephALEXin (KEFLEX) 500 MG capsule Take 1 capsule by mouth 4 times daily for 10 days 2/11/21 2/21/21  Adelina Valiente MD   metroNIDAZOLE (FLAGYL) 500 MG tablet Take 1 tablet by mouth 3 times daily for 10 days 2/10/21 2/20/21  Adelina Valiente MD        Allergies:  Codeine and Morphine    Social History:   TOBACCO:   reports that she quit smoking about 45 years ago. Her smoking use included cigarettes. She has a 10.00 pack-year smoking history. She has never used smokeless tobacco.  ETOH:   reports previous alcohol use. DRUGS:   reports no history of drug use. OCCUPATION:  Retired  Patient currently lives alone    Family History:        Problem Relation Age of Onset    Cancer Mother        REVIEW OF SYSTEMS:  CONSTITUTIONAL:  positive for  chills, malaise and anorexia  HEENT:  negative  RESPIRATORY:  negative  CARDIOVASCULAR:  negative  GASTROINTESTINAL:  negative except for nausea and abdominal pain  GENITOURINARY:  negative  HEMATOLOGIC/LYMPHATIC:  negative  NEUROLOGICAL:  Negative  * All other ROS reviewed and negative. PHYSICAL EXAM:  VITALS:  /61   Pulse 71   Temp 97.4 °F (36.3 °C) (Axillary)   Resp 16   Ht 5' 5\" (1.651 m)   Wt 126 lb 14.4 oz (57.6 kg)   SpO2 99%   BMI 21.12 kg/m²   24HR INTAKE/OUTPUT:    I/O last 3 completed shifts:   In: 100 [IV Piggyback:100]  Out: 110 [Urine:100; Drains:10]  I/O this shift:  In: -   Out: 100 [Urine:75; Drains:25]    CONSTITUTIONAL:  alert, mild distress and normal weight  EYES:  PERRL, sclera clear  ENT:  Normocephalic,atraumatic, without obvious abnormality  NECK:  supple, symmetrical, trachea midline LUNGS: Resp effort easy and unlabored,    CARDIOVASCULAR:  NO JVD, regular rate and rhythm and    ABDOMEN:  no scars,  , firm, non-distended, tenderness noted in the suprapubic region and perc drain in this area w/ feculent drainage around tubing and within tubing/bulb, involuntary guarding present, no masses palpated and    MUSCULOSKELETAL: No clubbing or cyanosis, 0+ pitting edema lower extremities  NEUROLOGIC:  Mental Status Exam:  Level of Alertness:   awake  PSYCHIATRIC:   person, place, time  SKIN:  normal skin color, texture, turgor    DATA:    CBC:   Recent Labs     02/15/21  1824 02/16/21  0707   WBC 15.3* 7.7   HGB 14.0 11.6*   HCT 43.3 34.9*    181     BMP:    Recent Labs     02/15/21  1824 02/16/21  0707    143   K 3.7 3.2*    111*   CO2 24 24   BUN 7 6*   CREATININE <0.5* <0.5*   GLUCOSE 171* 142*     Hepatic:   Recent Labs     02/15/21  1824 02/16/21  0707   AST 42* 22   ALT 22 13   BILITOT 0.8 0.7   ALKPHOS 232* 157*     Mag:      Recent Labs     02/16/21  0707   MG 1.90      Phos:   No results for input(s): PHOS in the last 72 hours. INR: No results for input(s): INR in the last 72 hours. Radiology Review: Images personally reviewed by me.    3.5 x 3.9 x 2.0 cm fluid collection with peripheral enhancement, contacting   the dome of the bladder and sigmoid colon, likely related to abscess or   fistula, increased in size since the prior study.       A pigtail drainage catheter with its tip in the lumen of the fluid   collection.  Mild infiltration of fat and few air bubbles along the track of   the catheter.       Wall thickening at the dome of bladder, may be reactive to the adjacent   abscess or fistula.       Wall thickening of the sigmoid colon with mild surrounding infiltration of   fat, likely related to acute diverticulitis, grossly stable.  Follow-up is   recommended to exclude neoplasm.       Mild dilatation of the appendix, nonspecific, without associated inflammatory changes. IMPRESSION/RECOMMENDATIONS:    Sigmoid diverticulitis w/ abscess, refractory to conservative therapy (antibiotics, percutaneous abscess drainage). - will need to proceed with surgical intervention at this time. - plan for ex lap, sigmoidectomy w/ probable diverting/temporary colostomy   - will have Ostomy Therapy RN visit patient today and [place] for appropriate ostomy sites. - discussed this plan in detail /w patient. She appears to understand, asks appropriate questions, and agrees to proceed.     Electronically signed by Jenn Lazaro MD     [address] [facility]  [id number]

## 2021-02-16 NOTE — PROGRESS NOTES
Pt admitted to 331 via wheelchair in stable condition. VSS. Pt a/ox4. Oriented to room and call light, call light and bedside table with in reach, wheels locked, nonskid footwear in place, bed in lowest position. Pt educated to call out if needing assistance to [facility], bed alarm in place. Pt provided with ice chips. Pt has RJP drain in place w/ dressing and purulent drainage.

## 2021-02-16 NOTE — PLAN OF CARE
Problem: Pain:  Goal: Pain level will decrease  Description: Pain level will decrease  Outcome: Ongoing   Pt rates pain 5/10 in lower abd, acute/ surgical pain. Pt educated on prn pain meds, tylenol given per pt's request. Will reassess as needed.

## 2021-02-16 NOTE — ED NOTES
Blood culture set #2 drawn from L hand with butterfly needle. Bottle tops scrubbed with alcohol pads. Site prepped with [employer] swab, 15 seconds per side, and allowed to dry for 30 seconds prior to venipuncture. Green/black waste tube drawn prior to collection of specimen.          Jg Esquivel RN  02/15/21 205

## 2021-02-17 ENCOUNTER — ANESTHESIA EVENT (OUTPATIENT)
Dept: OPERATING ROOM | Age: 71
DRG: 853 | End: 2021-02-17
Payer: MEDICARE

## 2021-02-17 LAB
A/G RATIO: 1.1 (ref 1.1–2.2)
ALBUMIN SERPL-MCNC: 3.1 G/DL (ref 3.4–5)
ALP BLD-CCNC: 170 U/L (ref 40–129)
ALT SERPL-CCNC: 16 U/L (ref 10–40)
ANION GAP SERPL CALCULATED.3IONS-SCNC: 9 MMOL/L (ref 3–16)
AST SERPL-CCNC: 23 U/L (ref 15–37)
BASOPHILS ABSOLUTE: 0 K/UL (ref 0–0.2)
BASOPHILS RELATIVE PERCENT: 0.2 %
BILIRUB SERPL-MCNC: 0.8 MG/DL (ref 0–1)
BUN BLDV-MCNC: 6 MG/DL (ref 7–20)
CALCIUM SERPL-MCNC: 8.8 MG/DL (ref 8.3–10.6)
CHLORIDE BLD-SCNC: 107 MMOL/L (ref 99–110)
CO2: 24 MMOL/L (ref 21–32)
CREAT SERPL-MCNC: <0.5 MG/DL (ref 0.6–1.2)
EOSINOPHILS ABSOLUTE: 0 K/UL (ref 0–0.6)
EOSINOPHILS RELATIVE PERCENT: 0.3 %
GFR AFRICAN AMERICAN: >60
GFR NON-AFRICAN AMERICAN: >60
GLOBULIN: 2.8 G/DL
GLUCOSE BLD-MCNC: 90 MG/DL (ref 70–99)
HCT VFR BLD CALC: 36.3 % (ref 36–48)
HEMOGLOBIN: 12.1 G/DL (ref 12–16)
LYMPHOCYTES ABSOLUTE: 1.1 K/UL (ref 1–5.1)
LYMPHOCYTES RELATIVE PERCENT: 10.7 %
MCH RBC QN AUTO: 29.4 PG (ref 26–34)
MCHC RBC AUTO-ENTMCNC: 33.3 G/DL (ref 31–36)
MCV RBC AUTO: 88.2 FL (ref 80–100)
MONOCYTES ABSOLUTE: 0.4 K/UL (ref 0–1.3)
MONOCYTES RELATIVE PERCENT: 4.1 %
NEUTROPHILS ABSOLUTE: 8.4 K/UL (ref 1.7–7.7)
NEUTROPHILS RELATIVE PERCENT: 84.7 %
PDW BLD-RTO: 15.2 % (ref 12.4–15.4)
PLATELET # BLD: 207 K/UL (ref 135–450)
PMV BLD AUTO: 7.9 FL (ref 5–10.5)
POTASSIUM REFLEX MAGNESIUM: 3.9 MMOL/L (ref 3.5–5.1)
RBC # BLD: 4.12 M/UL (ref 4–5.2)
SODIUM BLD-SCNC: 140 MMOL/L (ref 136–145)
TOTAL PROTEIN: 5.9 G/DL (ref 6.4–8.2)
WBC # BLD: 9.9 K/UL (ref 4–11)

## 2021-02-17 PROCEDURE — 86901 BLOOD TYPING SEROLOGIC RH(D): CPT

## 2021-02-17 PROCEDURE — 86900 BLOOD TYPING SEROLOGIC ABO: CPT

## 2021-02-17 PROCEDURE — APPSS45 APP SPLIT SHARED TIME 31-45 MINUTES: Performed by: CLINICAL NURSE SPECIALIST

## 2021-02-17 PROCEDURE — 99232 SBSQ HOSP IP/OBS MODERATE 35: CPT | Performed by: SURGERY

## 2021-02-17 PROCEDURE — 6360000002 HC RX W HCPCS: Performed by: INTERNAL MEDICINE

## 2021-02-17 PROCEDURE — APPNB45 APP NON BILLABLE 31-45 MINUTES: Performed by: CLINICAL NURSE SPECIALIST

## 2021-02-17 PROCEDURE — 6370000000 HC RX 637 (ALT 250 FOR IP): Performed by: INTERNAL MEDICINE

## 2021-02-17 PROCEDURE — 2580000003 HC RX 258: Performed by: EMERGENCY MEDICINE

## 2021-02-17 PROCEDURE — 80053 COMPREHEN METABOLIC PANEL: CPT

## 2021-02-17 PROCEDURE — 86850 RBC ANTIBODY SCREEN: CPT

## 2021-02-17 PROCEDURE — 2580000003 HC RX 258: Performed by: INTERNAL MEDICINE

## 2021-02-17 PROCEDURE — 85025 COMPLETE CBC W/AUTO DIFF WBC: CPT

## 2021-02-17 PROCEDURE — 1200000000 HC SEMI PRIVATE

## 2021-02-17 PROCEDURE — 36415 COLL VENOUS BLD VENIPUNCTURE: CPT

## 2021-02-17 RX ADMIN — SODIUM CHLORIDE 1000 ML: 9 INJECTION, SOLUTION INTRAVENOUS at 21:58

## 2021-02-17 RX ADMIN — ACETAMINOPHEN 650 MG: 325 TABLET, FILM COATED ORAL at 11:34

## 2021-02-17 RX ADMIN — PIPERACILLIN SODIUM,TAZOBACTAM SODIUM 4500 MG: 4; .5 INJECTION, POWDER, FOR SOLUTION INTRAVENOUS at 09:50

## 2021-02-17 RX ADMIN — PIPERACILLIN SODIUM,TAZOBACTAM SODIUM 4500 MG: 4; .5 INJECTION, POWDER, FOR SOLUTION INTRAVENOUS at 17:54

## 2021-02-17 RX ADMIN — ACETAMINOPHEN 650 MG: 325 TABLET, FILM COATED ORAL at 17:48

## 2021-02-17 RX ADMIN — PIPERACILLIN SODIUM,TAZOBACTAM SODIUM 4500 MG: 4; .5 INJECTION, POWDER, FOR SOLUTION INTRAVENOUS at 02:01

## 2021-02-17 RX ADMIN — ACETAMINOPHEN 650 MG: 325 TABLET, FILM COATED ORAL at 05:49

## 2021-02-17 ASSESSMENT — PAIN DESCRIPTION - PAIN TYPE: TYPE: SURGICAL PAIN

## 2021-02-17 ASSESSMENT — PAIN SCALES - GENERAL
PAINLEVEL_OUTOF10: 5
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
## 2021-02-17 NOTE — CARE COORDINATION
Chart reviewed. Care managed per ID surgery and IM. Plan for new ostomy. Counts include 234 beds at the [facility] following for needs. CM will follow post opcourse.  Pj Hogue RN
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
## 2021-02-17 NOTE — PROGRESS NOTES
[facility] SURGERY    PATIENT NAME: Betty Mosqueda     TODAY'S DATE: 2/17/2021    CHIEF COMPLAINT: abd pain    INTERVAL HISTORY/HPI:    Pt without acute events, anxious. Had a BM and feels better. REVIEW OF SYSTEMS:  Pertinent positives and negatives as per interval history section    OBJECTIVE:  VITALS:  /74   Pulse 82   Temp 97.5 °F (36.4 °C) (Oral)   Resp 16   Ht 5' 5\" (1.651 m)   Wt 127 lb 6.4 oz (57.8 kg)   SpO2 97%   BMI 21.20 kg/m²     INTAKE/OUTPUT:    I/O last 3 completed shifts: In: 3094.4 [P.O.:70; I.V.:3024.4]  Out: 420 [Urine:375; Drains:45]  I/O this shift:  In: -   Out: 75 [Urine:75]    CONSTITUTIONAL:  awake and alert  LUNGS:  Respirations easy and unlabored, no crackles or wheezing  CARD:  regular rate and rhythm  ABDOMEN:  Somewhat firm, non-distended, tenderness noted lower abd, perc drain in place centrally with feculent drainage    Data:  CBC:   Recent Labs     02/15/21  1824 02/16/21  0707 02/17/21  0544   WBC 15.3* 7.7 9.9   HGB 14.0 11.6* 12.1   HCT 43.3 34.9* 36.3    181 207     BMP:    Recent Labs     02/15/21  1824 02/16/21  0707 02/17/21  0544    143 140   K 3.7 3.2* 3.9    111* 107   CO2 24 24 24   BUN 7 6* 6*   CREATININE <0.5* <0.5* <0.5*   GLUCOSE 171* 142* 90     Hepatic:   Recent Labs     02/15/21  1824 02/16/21  0707 02/17/21  0544   AST 42* 22 23   ALT 22 13 16   BILITOT 0.8 0.7 0.8   ALKPHOS 232* 157* 170*     Mag:      Recent Labs     02/16/21  0707   MG 1.90          ASSESSMENT AND PLAN:  Sigmoid diverticulitis with abscess that has been refractory to antibiotics and perc drainage    Will plan for ex lap, sigmoidectomy w/ probable diverting/temporary colostomy during this admission, now scheduled for Th 2/18. WOCN has seen pt for ostomy marking.      ID following for antibiotic coverage Will call and discuss with pt's daughter: have attempted to call the pt's daughter several times without answer and there is no voicemail set up to leave a message. Electronically signed by EAM Babcock - [facility]    Surgery Staff    I have examined this patient and read and agree with the note by Ilia Tolentino CNP from today. Surgery planned for tomorrow, as described above.     [facility] RAY

## 2021-02-17 NOTE — PROGRESS NOTES
Shift assessment completed. Pt A&O x4, VSS. Non skid socks on. Pt calls out appropriately for assistance, SB assist to the bedside commode. Pt extremely anxious this morning. NPO with ice chips and popsicles. Denies any needs at this time. Bed locked and in lowest position. Call light and bedside table within reach. Will continue to monitor.

## 2021-02-17 NOTE — PLAN OF CARE
Pt remained free of falls. Call light within reach. Non skid footwear in place. Bed locked and in lowest position. Stand by assist. Calls out appropriately.

## 2021-02-18 ENCOUNTER — ANESTHESIA (OUTPATIENT)
Dept: OPERATING ROOM | Age: 71
DRG: 853 | End: 2021-02-18
Payer: MEDICARE

## 2021-02-18 VITALS
DIASTOLIC BLOOD PRESSURE: 74 MMHG | TEMPERATURE: 99.3 F | OXYGEN SATURATION: 100 % | SYSTOLIC BLOOD PRESSURE: 149 MMHG | RESPIRATION RATE: 11 BRPM

## 2021-02-18 LAB
ABO/RH: NORMAL
ANTIBODY SCREEN: NORMAL
GLUCOSE BLD-MCNC: 100 MG/DL (ref 70–99)
GLUCOSE BLD-MCNC: 103 MG/DL (ref 70–99)
GLUCOSE BLD-MCNC: 113 MG/DL (ref 70–99)
GLUCOSE BLD-MCNC: 147 MG/DL (ref 70–99)
GLUCOSE BLD-MCNC: 75 MG/DL (ref 70–99)
GLUCOSE BLD-MCNC: 76 MG/DL (ref 70–99)
PERFORMED ON: ABNORMAL
PERFORMED ON: NORMAL
PERFORMED ON: NORMAL
SARS-COV-2, NAAT: NOT DETECTED

## 2021-02-18 PROCEDURE — 6360000002 HC RX W HCPCS: Performed by: NURSE ANESTHETIST, CERTIFIED REGISTERED

## 2021-02-18 PROCEDURE — 87635 SARS-COV-2 COVID-19 AMP PRB: CPT

## 2021-02-18 PROCEDURE — 2500000003 HC RX 250 WO HCPCS: Performed by: NURSE ANESTHETIST, CERTIFIED REGISTERED

## 2021-02-18 PROCEDURE — 64488 TAP BLOCK BI INJECTION: CPT | Performed by: ANESTHESIOLOGY

## 2021-02-18 PROCEDURE — 2709999900 HC NON-CHARGEABLE SUPPLY: Performed by: SURGERY

## 2021-02-18 PROCEDURE — 2580000003 HC RX 258: Performed by: NURSE ANESTHETIST, CERTIFIED REGISTERED

## 2021-02-18 PROCEDURE — 2580000003 HC RX 258: Performed by: SURGERY

## 2021-02-18 PROCEDURE — 88307 TISSUE EXAM BY PATHOLOGIST: CPT

## 2021-02-18 PROCEDURE — 7100000001 HC PACU RECOVERY - ADDTL 15 MIN: Performed by: SURGERY

## 2021-02-18 PROCEDURE — 2580000003 HC RX 258: Performed by: INTERNAL MEDICINE

## 2021-02-18 PROCEDURE — 0DTN0ZZ RESECTION OF SIGMOID COLON, OPEN APPROACH: ICD-10-PCS | Performed by: SURGERY

## 2021-02-18 PROCEDURE — 3E0T3BZ INTRODUCTION OF ANESTHETIC AGENT INTO PERIPHERAL NERVES AND PLEXI, PERCUTANEOUS APPROACH: ICD-10-PCS | Performed by: SURGERY

## 2021-02-18 PROCEDURE — 6360000002 HC RX W HCPCS: Performed by: SURGERY

## 2021-02-18 PROCEDURE — 3600000004 HC SURGERY LEVEL 4 BASE: Performed by: SURGERY

## 2021-02-18 PROCEDURE — 1200000000 HC SEMI PRIVATE

## 2021-02-18 PROCEDURE — 6370000000 HC RX 637 (ALT 250 FOR IP): Performed by: INTERNAL MEDICINE

## 2021-02-18 PROCEDURE — 6360000002 HC RX W HCPCS: Performed by: ANESTHESIOLOGY

## 2021-02-18 PROCEDURE — 3700000001 HC ADD 15 MINUTES (ANESTHESIA): Performed by: SURGERY

## 2021-02-18 PROCEDURE — 44145 PARTIAL REMOVAL OF COLON: CPT | Performed by: SURGERY

## 2021-02-18 PROCEDURE — 2720000010 HC SURG SUPPLY STERILE: Performed by: SURGERY

## 2021-02-18 PROCEDURE — 2580000003 HC RX 258: Performed by: ANESTHESIOLOGY

## 2021-02-18 PROCEDURE — 88304 TISSUE EXAM BY PATHOLOGIST: CPT

## 2021-02-18 PROCEDURE — APPNB45 APP NON BILLABLE 31-45 MINUTES: Performed by: CLINICAL NURSE SPECIALIST

## 2021-02-18 PROCEDURE — C1729 CATH, DRAINAGE: HCPCS | Performed by: SURGERY

## 2021-02-18 PROCEDURE — 6360000002 HC RX W HCPCS: Performed by: INTERNAL MEDICINE

## 2021-02-18 PROCEDURE — 3600000014 HC SURGERY LEVEL 4 ADDTL 15MIN: Performed by: SURGERY

## 2021-02-18 PROCEDURE — 3700000000 HC ANESTHESIA ATTENDED CARE: Performed by: SURGERY

## 2021-02-18 PROCEDURE — 7100000000 HC PACU RECOVERY - FIRST 15 MIN: Performed by: SURGERY

## 2021-02-18 RX ORDER — NALOXONE HYDROCHLORIDE 0.4 MG/ML
0.4 INJECTION, SOLUTION INTRAMUSCULAR; INTRAVENOUS; SUBCUTANEOUS PRN
Status: DISCONTINUED | OUTPATIENT
Start: 2021-02-18 | End: 2021-02-24

## 2021-02-18 RX ORDER — OXYCODONE HYDROCHLORIDE AND ACETAMINOPHEN 5; 325 MG/1; MG/1
2 TABLET ORAL PRN
Status: DISCONTINUED | OUTPATIENT
Start: 2021-02-18 | End: 2021-02-18 | Stop reason: HOSPADM

## 2021-02-18 RX ORDER — MEPERIDINE HYDROCHLORIDE 50 MG/ML
12.5 INJECTION INTRAMUSCULAR; INTRAVENOUS; SUBCUTANEOUS EVERY 5 MIN PRN
Status: DISCONTINUED | OUTPATIENT
Start: 2021-02-18 | End: 2021-02-18 | Stop reason: HOSPADM

## 2021-02-18 RX ORDER — SODIUM CHLORIDE, SODIUM LACTATE, POTASSIUM CHLORIDE, CALCIUM CHLORIDE 600; 310; 30; 20 MG/100ML; MG/100ML; MG/100ML; MG/100ML
INJECTION, SOLUTION INTRAVENOUS CONTINUOUS PRN
Status: DISCONTINUED | OUTPATIENT
Start: 2021-02-18 | End: 2021-02-18 | Stop reason: SDUPTHER

## 2021-02-18 RX ORDER — DEXTROSE MONOHYDRATE 50 MG/ML
100 INJECTION, SOLUTION INTRAVENOUS PRN
Status: DISCONTINUED | OUTPATIENT
Start: 2021-02-18 | End: 2021-02-27 | Stop reason: HOSPADM

## 2021-02-18 RX ORDER — MAGNESIUM HYDROXIDE 1200 MG/15ML
LIQUID ORAL CONTINUOUS PRN
Status: COMPLETED | OUTPATIENT
Start: 2021-02-18 | End: 2021-02-18

## 2021-02-18 RX ORDER — SODIUM CHLORIDE 0.9 % (FLUSH) 0.9 %
10 SYRINGE (ML) INJECTION PRN
Status: DISCONTINUED | OUTPATIENT
Start: 2021-02-18 | End: 2021-02-18 | Stop reason: HOSPADM

## 2021-02-18 RX ORDER — LABETALOL HYDROCHLORIDE 5 MG/ML
INJECTION, SOLUTION INTRAVENOUS PRN
Status: DISCONTINUED | OUTPATIENT
Start: 2021-02-18 | End: 2021-02-18 | Stop reason: SDUPTHER

## 2021-02-18 RX ORDER — ONDANSETRON 2 MG/ML
INJECTION INTRAMUSCULAR; INTRAVENOUS PRN
Status: DISCONTINUED | OUTPATIENT
Start: 2021-02-18 | End: 2021-02-18 | Stop reason: SDUPTHER

## 2021-02-18 RX ORDER — DEXTROSE MONOHYDRATE 25 G/50ML
12.5 INJECTION, SOLUTION INTRAVENOUS PRN
Status: DISCONTINUED | OUTPATIENT
Start: 2021-02-18 | End: 2021-02-27 | Stop reason: HOSPADM

## 2021-02-18 RX ORDER — ONDANSETRON 2 MG/ML
4 INJECTION INTRAMUSCULAR; INTRAVENOUS EVERY 30 MIN PRN
Status: DISCONTINUED | OUTPATIENT
Start: 2021-02-18 | End: 2021-02-18 | Stop reason: HOSPADM

## 2021-02-18 RX ORDER — SODIUM CHLORIDE 0.9 % (FLUSH) 0.9 %
10 SYRINGE (ML) INJECTION EVERY 12 HOURS SCHEDULED
Status: DISCONTINUED | OUTPATIENT
Start: 2021-02-18 | End: 2021-02-18 | Stop reason: HOSPADM

## 2021-02-18 RX ORDER — LIDOCAINE HYDROCHLORIDE 10 MG/ML
0.3 INJECTION, SOLUTION EPIDURAL; INFILTRATION; INTRACAUDAL; PERINEURAL
Status: DISCONTINUED | OUTPATIENT
Start: 2021-02-18 | End: 2021-02-18 | Stop reason: HOSPADM

## 2021-02-18 RX ORDER — FENTANYL CITRATE 50 UG/ML
INJECTION, SOLUTION INTRAMUSCULAR; INTRAVENOUS PRN
Status: DISCONTINUED | OUTPATIENT
Start: 2021-02-18 | End: 2021-02-18 | Stop reason: SDUPTHER

## 2021-02-18 RX ORDER — PROPOFOL 10 MG/ML
INJECTION, EMULSION INTRAVENOUS PRN
Status: DISCONTINUED | OUTPATIENT
Start: 2021-02-18 | End: 2021-02-18 | Stop reason: SDUPTHER

## 2021-02-18 RX ORDER — NICOTINE POLACRILEX 4 MG
15 LOZENGE BUCCAL PRN
Status: DISCONTINUED | OUTPATIENT
Start: 2021-02-18 | End: 2021-02-27 | Stop reason: HOSPADM

## 2021-02-18 RX ORDER — LABETALOL HYDROCHLORIDE 5 MG/ML
5 INJECTION, SOLUTION INTRAVENOUS
Status: DISCONTINUED | OUTPATIENT
Start: 2021-02-18 | End: 2021-02-18 | Stop reason: HOSPADM

## 2021-02-18 RX ORDER — HYDRALAZINE HYDROCHLORIDE 20 MG/ML
5 INJECTION INTRAMUSCULAR; INTRAVENOUS EVERY 30 MIN PRN
Status: DISCONTINUED | OUTPATIENT
Start: 2021-02-18 | End: 2021-02-18 | Stop reason: HOSPADM

## 2021-02-18 RX ORDER — SODIUM CHLORIDE, SODIUM LACTATE, POTASSIUM CHLORIDE, CALCIUM CHLORIDE 600; 310; 30; 20 MG/100ML; MG/100ML; MG/100ML; MG/100ML
INJECTION, SOLUTION INTRAVENOUS CONTINUOUS
Status: DISCONTINUED | OUTPATIENT
Start: 2021-02-18 | End: 2021-02-18

## 2021-02-18 RX ORDER — DIPHENHYDRAMINE HYDROCHLORIDE 50 MG/ML
6.25 INJECTION INTRAMUSCULAR; INTRAVENOUS
Status: DISCONTINUED | OUTPATIENT
Start: 2021-02-18 | End: 2021-02-18 | Stop reason: HOSPADM

## 2021-02-18 RX ORDER — OXYCODONE HYDROCHLORIDE AND ACETAMINOPHEN 5; 325 MG/1; MG/1
1 TABLET ORAL PRN
Status: DISCONTINUED | OUTPATIENT
Start: 2021-02-18 | End: 2021-02-18 | Stop reason: HOSPADM

## 2021-02-18 RX ORDER — ROCURONIUM BROMIDE 10 MG/ML
INJECTION, SOLUTION INTRAVENOUS PRN
Status: DISCONTINUED | OUTPATIENT
Start: 2021-02-18 | End: 2021-02-18 | Stop reason: SDUPTHER

## 2021-02-18 RX ORDER — LIDOCAINE HYDROCHLORIDE 20 MG/ML
INJECTION, SOLUTION INFILTRATION; PERINEURAL PRN
Status: DISCONTINUED | OUTPATIENT
Start: 2021-02-18 | End: 2021-02-18 | Stop reason: SDUPTHER

## 2021-02-18 RX ADMIN — ROCURONIUM BROMIDE 25 MG: 10 SOLUTION INTRAVENOUS at 15:24

## 2021-02-18 RX ADMIN — HYDROMORPHONE HYDROCHLORIDE 0.5 MG: 1 INJECTION, SOLUTION INTRAMUSCULAR; INTRAVENOUS; SUBCUTANEOUS at 17:18

## 2021-02-18 RX ADMIN — ROCURONIUM BROMIDE 50 MG: 10 SOLUTION INTRAVENOUS at 14:28

## 2021-02-18 RX ADMIN — SUGAMMADEX 200 MG: 100 INJECTION, SOLUTION INTRAVENOUS at 16:43

## 2021-02-18 RX ADMIN — HYDROMORPHONE HYDROCHLORIDE: 10 INJECTION, SOLUTION INTRAMUSCULAR; INTRAVENOUS; SUBCUTANEOUS at 20:41

## 2021-02-18 RX ADMIN — FENTANYL CITRATE 100 MCG: 50 INJECTION INTRAMUSCULAR; INTRAVENOUS at 14:58

## 2021-02-18 RX ADMIN — PIPERACILLIN SODIUM,TAZOBACTAM SODIUM 4500 MG: 4; .5 INJECTION, POWDER, FOR SOLUTION INTRAVENOUS at 20:10

## 2021-02-18 RX ADMIN — PIPERACILLIN SODIUM,TAZOBACTAM SODIUM 4500 MG: 4; .5 INJECTION, POWDER, FOR SOLUTION INTRAVENOUS at 01:36

## 2021-02-18 RX ADMIN — SODIUM CHLORIDE, SODIUM LACTATE, POTASSIUM CHLORIDE, AND CALCIUM CHLORIDE: .6; .31; .03; .02 INJECTION, SOLUTION INTRAVENOUS at 14:14

## 2021-02-18 RX ADMIN — LIDOCAINE HYDROCHLORIDE 100 MG: 20 INJECTION, SOLUTION INFILTRATION; PERINEURAL at 14:27

## 2021-02-18 RX ADMIN — HYDROMORPHONE HYDROCHLORIDE 0.5 MG: 1 INJECTION, SOLUTION INTRAMUSCULAR; INTRAVENOUS; SUBCUTANEOUS at 17:10

## 2021-02-18 RX ADMIN — PROPOFOL 80 MG: 10 INJECTION, EMULSION INTRAVENOUS at 14:27

## 2021-02-18 RX ADMIN — FENTANYL CITRATE 100 MCG: 50 INJECTION INTRAMUSCULAR; INTRAVENOUS at 14:48

## 2021-02-18 RX ADMIN — PIPERACILLIN SODIUM,TAZOBACTAM SODIUM 4500 MG: 4; .5 INJECTION, POWDER, FOR SOLUTION INTRAVENOUS at 08:15

## 2021-02-18 RX ADMIN — FENTANYL CITRATE 100 MCG: 50 INJECTION INTRAMUSCULAR; INTRAVENOUS at 14:23

## 2021-02-18 RX ADMIN — LABETALOL HYDROCHLORIDE 5 MG: 5 INJECTION, SOLUTION INTRAVENOUS at 15:39

## 2021-02-18 RX ADMIN — SODIUM CHLORIDE, SODIUM LACTATE, POTASSIUM CHLORIDE, AND CALCIUM CHLORIDE: .6; .31; .03; .02 INJECTION, SOLUTION INTRAVENOUS at 14:34

## 2021-02-18 RX ADMIN — SODIUM CHLORIDE, PRESERVATIVE FREE 10 ML: 5 INJECTION INTRAVENOUS at 08:15

## 2021-02-18 RX ADMIN — FENTANYL CITRATE 50 MCG: 50 INJECTION INTRAMUSCULAR; INTRAVENOUS at 14:44

## 2021-02-18 RX ADMIN — ACETAMINOPHEN 650 MG: 325 TABLET, FILM COATED ORAL at 08:15

## 2021-02-18 RX ADMIN — HYDROMORPHONE HYDROCHLORIDE 0.5 MG: 1 INJECTION, SOLUTION INTRAMUSCULAR; INTRAVENOUS; SUBCUTANEOUS at 17:57

## 2021-02-18 RX ADMIN — ONDANSETRON 4 MG: 2 INJECTION INTRAMUSCULAR; INTRAVENOUS at 14:28

## 2021-02-18 RX ADMIN — ACETAMINOPHEN 650 MG: 325 TABLET, FILM COATED ORAL at 01:35

## 2021-02-18 ASSESSMENT — PULMONARY FUNCTION TESTS
PIF_VALUE: 15
PIF_VALUE: 16
PIF_VALUE: 15
PIF_VALUE: 16
PIF_VALUE: 15
PIF_VALUE: 16
PIF_VALUE: 15
PIF_VALUE: 16
PIF_VALUE: 15
PIF_VALUE: 15
PIF_VALUE: 13
PIF_VALUE: 15
PIF_VALUE: 14
PIF_VALUE: 15
PIF_VALUE: 1
PIF_VALUE: 13
PIF_VALUE: 15
PIF_VALUE: 15
PIF_VALUE: 19
PIF_VALUE: 15
PIF_VALUE: 14
PIF_VALUE: 3
PIF_VALUE: 3
PIF_VALUE: 14
PIF_VALUE: 15
PIF_VALUE: 14
PIF_VALUE: 14
PIF_VALUE: 15
PIF_VALUE: 15
PIF_VALUE: 13
PIF_VALUE: 15
PIF_VALUE: 3
PIF_VALUE: 16
PIF_VALUE: 14
PIF_VALUE: 0
PIF_VALUE: 14
PIF_VALUE: 15
PIF_VALUE: 14
PIF_VALUE: 13
PIF_VALUE: 14
PIF_VALUE: 13
PIF_VALUE: 15
PIF_VALUE: 13
PIF_VALUE: 15
PIF_VALUE: 14
PIF_VALUE: 16
PIF_VALUE: 15
PIF_VALUE: 16
PIF_VALUE: 15
PIF_VALUE: 15
PIF_VALUE: 14
PIF_VALUE: 16
PIF_VALUE: 15
PIF_VALUE: 15
PIF_VALUE: 14
PIF_VALUE: 15
PIF_VALUE: 16
PIF_VALUE: 25
PIF_VALUE: 15
PIF_VALUE: 15
PIF_VALUE: 13
PIF_VALUE: 15
PIF_VALUE: 14
PIF_VALUE: 15
PIF_VALUE: 1

## 2021-02-18 ASSESSMENT — PAIN DESCRIPTION - PAIN TYPE
TYPE: SURGICAL PAIN
TYPE: SURGICAL PAIN

## 2021-02-18 ASSESSMENT — PAIN SCALES - GENERAL
PAINLEVEL_OUTOF10: 10
PAINLEVEL_OUTOF10: 8
PAINLEVEL_OUTOF10: 6
PAINLEVEL_OUTOF10: 5
PAINLEVEL_OUTOF10: 6

## 2021-02-18 ASSESSMENT — PAIN DESCRIPTION - LOCATION
LOCATION: ABDOMEN

## 2021-02-18 ASSESSMENT — PAIN DESCRIPTION - DESCRIPTORS: DESCRIPTORS: ACHING

## 2021-02-18 ASSESSMENT — PAIN DESCRIPTION - ORIENTATION
ORIENTATION: LOWER
ORIENTATION: MID

## 2021-02-18 ASSESSMENT — PAIN DESCRIPTION - FREQUENCY: FREQUENCY: INTERMITTENT

## 2021-02-18 NOTE — CARE COORDINATION
Chart reviewed plan for OR today. New ostomy. CM will follow post op course. [facility] following as well.  Shea Lombardi RN

## 2021-02-18 NOTE — PLAN OF CARE
Problem: Pain:  Goal: Control of acute pain  Description: Control of acute pain  Outcome: Ongoing  Note: Pt will gain control of acute pain throughout this shift. Will continue to monitor.

## 2021-02-18 NOTE — ANESTHESIA PRE PROCEDURE
Department of Anesthesiology  Preprocedure Note       Name:  Alexy Callaway   Age:  72 y.o.  :  1950                                          MRN:  [id number]         Date:  2021      Surgeon: Simeon Samayoa):  Meaghan Alvarado MD    Procedure: Procedure(s):  EXPLORATORY LAPAROTOMY WITH COLON RESECTION, POSSIBLE COLOSTOMY    Medications prior to admission:   Prior to Admission medications    Medication Sig Start Date End Date Taking?  Authorizing Provider   cephALEXin (KEFLEX) 500 MG capsule Take 1 capsule by mouth 4 times daily for 10 days 21  Eber Aguilar MD   metroNIDAZOLE (FLAGYL) 500 MG tablet Take 1 tablet by mouth 3 times daily for 10 days 2/10/21 2/20/21  Eber Aguilar MD       Current medications:    Current Facility-Administered Medications   Medication Dose Route Frequency Provider Last Rate Last Admin    meperidine (DEMEROL) injection 12.5 mg  12.5 mg Intravenous Q5 Min PRN Monique Ruggiero MD        HYDROmorphone (DILAUDID) injection 0.5 mg  0.5 mg Intravenous Q10 Min PRN Monique Ruggiero MD        HYDROmorphone (DILAUDID) injection 0.5 mg  0.5 mg Intravenous Q5 Min PRN Monique Ruggiero MD        oxyCODONE-acetaminophen (PERCOCET) 5-325 MG per tablet 1 tablet  1 tablet Oral PRN Monique Ruggiero MD        Or    oxyCODONE-acetaminophen (PERCOCET) 5-325 MG per tablet 2 tablet  2 tablet Oral PRN Monique Ruggiero MD        ondansetron [facility]) injection 4 mg  4 mg Intravenous Q30 Min PRN Monique Ruggiero MD        diphenhydrAMINE (BENADRYL) injection 6.25 mg  6.25 mg Intravenous Once PRN Monique Ruggiero MD        labetalol (NORMODYNE;TRANDATE) injection 5 mg  5 mg Intravenous Q15 Min PRN Monique Ruggiero MD        hydrALAZINE (APRESOLINE) injection 5 mg  5 mg Intravenous Q30 Min PRN Monique Ruggiero MD        [facility]) chewable tablet 80 mg  80 mg Oral Q6H PRN Gallito Distance, APRN - CNP   80 mg at 21 3842  promethazine (PHENERGAN) injection 6.25 mg  6.25 mg Intramuscular Q6H PRN EMA Goldberg - CNP        0.9 % sodium chloride infusion  1,000 mL Intravenous Continuous Jules Hassan MD   Stopped at 02/18/21 0604    sodium chloride flush 0.9 % injection 10 mL  10 mL Intravenous 2 times per day Iglesia Santos ADAL Benitez, DO   10 mL at 02/18/21 0815    sodium chloride flush 0.9 % injection 10 mL  10 mL Intravenous PRN Tika Benitez, DO        magnesium sulfate 2000 mg in 50 mL IVPB premix  2,000 mg Intravenous PRN Tika Benitez, DO        ondansetron (ZOFRAN) injection 4 mg  4 mg Intravenous Q6H PRN Kirby Benitez, DO   4 mg at 02/16/21 1838    famotidine (PEPCID) tablet 20 mg  20 mg Oral Daily PRN Dariel Arguello, DO        acetaminophen (TYLENOL) tablet 650 mg  650 mg Oral Q6H PRN Kirby Benitez, DO   650 mg at 02/18/21 0815    Or    acetaminophen (TYLENOL) suppository 650 mg  650 mg Rectal Q6H PRN Kirby Santozi, DO        piperacillin-tazobactam (ZOSYN) 4,500 mg in dextrose 5 % 100 mL IVPB (mini-bag)  4,500 mg Intravenous Q8H Ahmad ADAL Santozi, DO   Stopped at 02/18/21 0845       Allergies:     Allergies   Allergen Reactions    Codeine Other (See Comments)     Ears ringing    Morphine Other (See Comments)     hallucinations       Problem List:    Patient Active Problem List   Diagnosis Code    Primary osteoarthritis of both hips M16.0    Lung nodule seen on imaging study R91.1    Neck pain M54.2    Mixed hyperlipidemia E78.2    White coat syndrome without diagnosis of hypertension R03.0    Osteopenia of multiple sites M85.89    Family history of breast cancer in mother Z80.2    Asymptomatic varicose veins of right lower extremity I83.91    Stress reaction F43.0    Perforation of sigmoid colon ([address] K63.1    Melanotic stools K92.1    Unintentional weight loss R63.4    Diverticulosis K57.90    Transaminitis R74.01    Abscess of sigmoid colon K63.0       Past Medical History: Diagnosis Date    Acute hemorrhoid     Depression     Elevated blood pressure reading in office with white coat syndrome, without diagnosis of hypertension     Osteoarthritis        Past Surgical History:        Procedure Laterality Date    BREAST ENHANCEMENT SURGERY  3836    silicon injected    CT RETROPERITONEAL PERC DRAIN  2021    CT RETROPERITONEAL PERC DRAIN 2021 MD Rosie Guzmán CT SCAN    TUBAL LIGATION         Social History:    Social History     Tobacco Use    Smoking status: Former Smoker     Packs/day: 1.00     Years: 10.00     Pack years: 10.00     Types: Cigarettes     Quit date:      Years since quittin.1    Smokeless tobacco: Never Used   Substance Use Topics    Alcohol use: Not Currently                                Counseling given: Not Answered      Vital Signs (Current):   Vitals:    21 0142 21 0438 21 0830 21 1330   BP: 130/69  131/68 (!) 156/70   Pulse: 69  70 88   Resp: 18  16 16   Temp: 97.7 °F (36.5 °C)  97.7 °F (36.5 °C) 98.2 °F (36.8 °C)   TempSrc: Axillary  Axillary Temporal   SpO2: 98%  98% 99%   Weight:  127 lb 12.8 oz (58 kg)     Height:                                                  BP Readings from Last 3 Encounters:   21 (!) 156/70   21 119/67   19 (!) 140/80       NPO Status:                                                                                 BMI:   Wt Readings from Last 3 Encounters:   21 127 lb 12.8 oz (58 kg)   21 128 lb 9.6 oz (58.3 kg)   19 155 lb (70.3 kg)     Body mass index is 21.27 kg/m².     CBC:   Lab Results   Component Value Date    WBC 9.9 2021    RBC 4.12 2021    HGB 12.1 2021    HCT 36.3 2021    MCV 88.2 2021    RDW 15.2 2021     2021       CMP:   Lab Results   Component Value Date     2021    K 3.9 2021     2021    CO2 24 2021    BUN 6 2021 CREATININE <0.5 02/17/2021    GFRAA >60 02/17/2021    AGRATIO 1.1 02/17/2021    LABGLOM >60 02/17/2021    GLUCOSE 90 02/17/2021    PROT 5.9 02/17/2021    CALCIUM 8.8 02/17/2021    BILITOT 0.8 02/17/2021    ALKPHOS 170 02/17/2021    AST 23 02/17/2021    ALT 16 02/17/2021       POC Tests:   Recent Labs     02/18/21  1329   POCGLU 76       Coags:   Lab Results   Component Value Date    PROTIME 15.7 02/02/2021    INR 1.35 02/02/2021       HCG (If Applicable): No results found for: PREGTESTUR, PREGSERUM, HCG, HCGQUANT     ABGs: No results found for: PHART, PO2ART, IGT4UUU, OTP5WGC, BEART, A0FDQBWM     Type & Screen (If Applicable):  No results found for: LABABO, LABRH    Drug/Infectious Status (If Applicable):  No results found for: HIV, HEPCAB    COVID-19 Screening (If Applicable):   Lab Results   Component Value Date    COVID19 Not Detected 02/18/2021         Anesthesia Evaluation  Patient summary reviewed and Nursing notes reviewed no history of anesthetic complications:   Airway: Mallampati: II     Neck ROM: full   Dental:          Pulmonary:                              Cardiovascular:    (+) hypertension:,                   Neuro/Psych:   (+) psychiatric history:            GI/Hepatic/Renal:             Endo/Other:                     Abdominal:           Vascular:                                        Anesthesia Plan      general     ASA 2     (Medications & allergies reviewed  All available lab & EKG data reviewed)  Induction: intravenous. Anesthetic plan and risks discussed with patient. Plan discussed with CRNA.                   Shantal Bergeron MD   2/18/2021

## 2021-02-18 NOTE — ANESTHESIA PROCEDURE NOTES
Peripheral Block    Patient location during procedure: OR  Start time: 2/18/2021 4:39 PM  End time: 2/18/2021 4:45 PM  Staffing  Performed: anesthesiologist   Anesthesiologist: Saskia Calderón MD  Preanesthetic Checklist  Completed: patient identified, IV checked, site marked, risks and benefits discussed, surgical consent, monitors and equipment checked, pre-op evaluation, timeout performed, anesthesia consent given, oxygen available and patient being monitored  Peripheral Block  Patient position: supine  Prep: ChloraPrep  Patient monitoring: continuous pulse ox, IV access, cardiac monitor, continuous capnometry and frequent blood pressure checks  Block type: TAP  Laterality: bilateral  Injection technique: single-shot  Guidance: ultrasound guided  Provider prep: sterile gloves  Needle  Needle type: combined needle/nerve stimulator   Needle gauge: 21 G  Needle length: 10 cm  Needle localization: ultrasound guidance  Test dose: negative  Assessment  Injection assessment: negative aspiration for heme, no paresthesia on injection and local visualized surrounding nerve on ultrasound  Paresthesia pain: none  Slow fractionated injection: yes  Hemodynamics: stable  Additional Notes  BPV 0.25% 20cc in divided doses at each site  Pt under GA  Reason for block: post-op pain management and at surgeon's request
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
## 2021-02-18 NOTE — PROGRESS NOTES
Patient arrived in PACU at this time and placed on monitor in stable condition. Report received from [facility] and [address]. Will continue to monitor.

## 2021-02-18 NOTE — PROGRESS NOTES
Care assumed from [facility] at [id number], agree with assessment. Will continue to monitor.

## 2021-02-18 NOTE — BRIEF OP NOTE
Brief Postoperative Note      Patient: Tori Ruelas  YOB: 1950  MRN: [id number]    Date of Procedure: 2/18/2021    Pre-Op Diagnosis: Duverticulitis w/ abscess    Post-Op Diagnosis: Same       Procedure(s):  EXPLORATORY LAPAROTOMY, SIGMOIDECTOMY WITH ANASTOMOSIS    Surgeon(s):  Merced Smith MD    Assistant:  Surgical Assistant: Bradford Postal    Anesthesia: General    Estimated Blood Loss (mL): less than 50     Complications: None    Specimens:   ID Type Source Tests Collected by Time Destination   A : SIGMOID COLON, OPEN END PROXIMAL MARGIN Tissue Tissue SURGICAL PATHOLOGY Merced Smith MD 2/18/2021 1524    B : DISTAL ANASTOMOTIC DONUT Tissue Tissue SURGICAL PATHOLOGY Merced Smith MD 2/18/2021 1600    C : PROXIMAL ANASTOMOTIC DONUT Tissue Tissue SURGICAL PATHOLOGY Merced Smith MD 2/18/2021 1601        Implants:  * No implants in log *      Drains:   Closed/Suction Drain Midline Other (Comment) Bulb 10 Brazilian (Active)   Site Description Unable to view 02/17/21 1754   Dressing Status Clean;Dry; Intact 02/17/21 1754   Drainage Appearance Brown 02/17/21 1754   Status To bulb suction 02/17/21 1754   Output (ml) 10 ml 02/18/21 0142       Closed/Suction Drain Right Abdomen Bulb 19 Brazilian (Active)   Dressing Status Clean;Dry; Intact 02/18/21 1717   Drainage Appearance Bloody 02/18/21 1717   Status To bulb suction 02/18/21 1717   Output (ml) 55 ml 02/18/21 1717       Urethral Catheter 16 fr (Active)   $ Urethral catheter insertion Inserted for procedure 02/18/21 1715   Catheter Indications Perioperative use in selected surgeries including but not limited to urologic, pelvic or need for intraoperative monitoring of urinary output due to prolonged surgery, large volume infusion or need for diuretic therapy in surgery 02/18/21 1715   Securement Device Date Changed 02/18/21 02/18/21 1715   Site Assessment Pink 02/18/21 1715   Urine Color Yellow 02/18/21 1715 Urine Appearance Clear 02/18/21 1715       [REMOVED] Closed/Suction Drain Left; Anterior Hip Bulb (Removed)       [REMOVED] Urethral Catheter Non-latex; Double-lumen 16 fr (Removed)   Catheter Indications Perioperative use in selected surgeries including but not limited to urologic, pelvic or need for intraoperative monitoring of urinary output due to prolonged surgery, large volume infusion or need for diuretic therapy in surgery 02/02/21 1254   Site Assessment Pink; No urethral drainage 02/02/21 1254   Urine Color Yellow 02/02/21 1254   Urine Appearance Clear 02/02/21 1254   Output (mL) 275 mL 02/02/21 0504       Findings: 1) focal area of severe inflammation of mid-sigmoid, adherent anterolaterally to pelvic wall w/ small walled-off abscess.   Benign colon proximally and rectum distally             2) sigmoidectomy w/ stapled 29mm EEA colorectal anastomosis, no leak w/ insufflation    Job#: [id number]    Electronically signed by Zainab Zapata MD on 2/20/2021 at 10:18 AM

## 2021-02-19 LAB
ANION GAP SERPL CALCULATED.3IONS-SCNC: 8 MMOL/L (ref 3–16)
BASOPHILS ABSOLUTE: 0 K/UL (ref 0–0.2)
BASOPHILS RELATIVE PERCENT: 0.2 %
BLOOD CULTURE, ROUTINE: NORMAL
BUN BLDV-MCNC: 3 MG/DL (ref 7–20)
CALCIUM SERPL-MCNC: 8.4 MG/DL (ref 8.3–10.6)
CHLORIDE BLD-SCNC: 106 MMOL/L (ref 99–110)
CO2: 23 MMOL/L (ref 21–32)
CREAT SERPL-MCNC: <0.5 MG/DL (ref 0.6–1.2)
CULTURE, BLOOD 2: NORMAL
EOSINOPHILS ABSOLUTE: 0 K/UL (ref 0–0.6)
EOSINOPHILS RELATIVE PERCENT: 0 %
GFR AFRICAN AMERICAN: >60
GFR NON-AFRICAN AMERICAN: >60
GLUCOSE BLD-MCNC: 100 MG/DL (ref 70–99)
GLUCOSE BLD-MCNC: 104 MG/DL (ref 70–99)
GLUCOSE BLD-MCNC: 117 MG/DL (ref 70–99)
GLUCOSE BLD-MCNC: 121 MG/DL (ref 70–99)
GLUCOSE BLD-MCNC: 128 MG/DL (ref 70–99)
GLUCOSE BLD-MCNC: 132 MG/DL (ref 70–99)
HCT VFR BLD CALC: 33.4 % (ref 36–48)
HEMOGLOBIN: 11 G/DL (ref 12–16)
LYMPHOCYTES ABSOLUTE: 0.6 K/UL (ref 1–5.1)
LYMPHOCYTES RELATIVE PERCENT: 6.6 %
MCH RBC QN AUTO: 29.1 PG (ref 26–34)
MCHC RBC AUTO-ENTMCNC: 33 G/DL (ref 31–36)
MCV RBC AUTO: 88.1 FL (ref 80–100)
MONOCYTES ABSOLUTE: 0.3 K/UL (ref 0–1.3)
MONOCYTES RELATIVE PERCENT: 3.7 %
NEUTROPHILS ABSOLUTE: 7.6 K/UL (ref 1.7–7.7)
NEUTROPHILS RELATIVE PERCENT: 89.5 %
PDW BLD-RTO: 15.4 % (ref 12.4–15.4)
PERFORMED ON: ABNORMAL
PLATELET # BLD: 226 K/UL (ref 135–450)
PMV BLD AUTO: 7.4 FL (ref 5–10.5)
POTASSIUM SERPL-SCNC: 3.4 MMOL/L (ref 3.5–5.1)
RBC # BLD: 3.79 M/UL (ref 4–5.2)
SODIUM BLD-SCNC: 137 MMOL/L (ref 136–145)
WBC # BLD: 8.5 K/UL (ref 4–11)

## 2021-02-19 PROCEDURE — 94761 N-INVAS EAR/PLS OXIMETRY MLT: CPT

## 2021-02-19 PROCEDURE — 2700000000 HC OXYGEN THERAPY PER DAY

## 2021-02-19 PROCEDURE — 2580000003 HC RX 258: Performed by: SURGERY

## 2021-02-19 PROCEDURE — 85025 COMPLETE CBC W/AUTO DIFF WBC: CPT

## 2021-02-19 PROCEDURE — 6360000002 HC RX W HCPCS: Performed by: SURGERY

## 2021-02-19 PROCEDURE — 99024 POSTOP FOLLOW-UP VISIT: CPT | Performed by: SURGERY

## 2021-02-19 PROCEDURE — 6360000002 HC RX W HCPCS: Performed by: INTERNAL MEDICINE

## 2021-02-19 PROCEDURE — 36415 COLL VENOUS BLD VENIPUNCTURE: CPT

## 2021-02-19 PROCEDURE — APPNB45 APP NON BILLABLE 31-45 MINUTES: Performed by: CLINICAL NURSE SPECIALIST

## 2021-02-19 PROCEDURE — 1200000000 HC SEMI PRIVATE

## 2021-02-19 PROCEDURE — 2580000003 HC RX 258: Performed by: INTERNAL MEDICINE

## 2021-02-19 PROCEDURE — APPSS45 APP SPLIT SHARED TIME 31-45 MINUTES: Performed by: CLINICAL NURSE SPECIALIST

## 2021-02-19 PROCEDURE — 2500000003 HC RX 250 WO HCPCS: Performed by: CLINICAL NURSE SPECIALIST

## 2021-02-19 PROCEDURE — 6360000002 HC RX W HCPCS: Performed by: CLINICAL NURSE SPECIALIST

## 2021-02-19 PROCEDURE — 99232 SBSQ HOSP IP/OBS MODERATE 35: CPT | Performed by: INTERNAL MEDICINE

## 2021-02-19 PROCEDURE — 80048 BASIC METABOLIC PNL TOTAL CA: CPT

## 2021-02-19 RX ORDER — DIPHENHYDRAMINE HYDROCHLORIDE 50 MG/ML
6.25 INJECTION INTRAMUSCULAR; INTRAVENOUS EVERY 6 HOURS PRN
Status: DISCONTINUED | OUTPATIENT
Start: 2021-02-19 | End: 2021-02-27 | Stop reason: HOSPADM

## 2021-02-19 RX ORDER — KETOROLAC TROMETHAMINE 30 MG/ML
15 INJECTION, SOLUTION INTRAMUSCULAR; INTRAVENOUS EVERY 6 HOURS
Status: COMPLETED | OUTPATIENT
Start: 2021-02-19 | End: 2021-02-22

## 2021-02-19 RX ADMIN — PIPERACILLIN AND TAZOBACTAM 3375 MG: 3; .375 INJECTION, POWDER, FOR SOLUTION INTRAVENOUS at 18:46

## 2021-02-19 RX ADMIN — SODIUM CHLORIDE 1000 ML: 9 INJECTION, SOLUTION INTRAVENOUS at 03:31

## 2021-02-19 RX ADMIN — KETOROLAC TROMETHAMINE 15 MG: 30 INJECTION, SOLUTION INTRAMUSCULAR at 18:24

## 2021-02-19 RX ADMIN — KETOROLAC TROMETHAMINE 15 MG: 30 INJECTION, SOLUTION INTRAMUSCULAR at 23:35

## 2021-02-19 RX ADMIN — FAMOTIDINE 20 MG: 10 INJECTION, SOLUTION INTRAVENOUS at 20:26

## 2021-02-19 RX ADMIN — SODIUM CHLORIDE 1000 ML: 9 INJECTION, SOLUTION INTRAVENOUS at 16:17

## 2021-02-19 RX ADMIN — PIPERACILLIN SODIUM,TAZOBACTAM SODIUM 4500 MG: 4; .5 INJECTION, POWDER, FOR SOLUTION INTRAVENOUS at 03:29

## 2021-02-19 RX ADMIN — DIPHENHYDRAMINE HYDROCHLORIDE 6.25 MG: 50 INJECTION, SOLUTION INTRAMUSCULAR; INTRAVENOUS at 16:46

## 2021-02-19 RX ADMIN — PIPERACILLIN SODIUM,TAZOBACTAM SODIUM 4500 MG: 4; .5 INJECTION, POWDER, FOR SOLUTION INTRAVENOUS at 10:40

## 2021-02-19 RX ADMIN — FAMOTIDINE 20 MG: 10 INJECTION, SOLUTION INTRAVENOUS at 13:56

## 2021-02-19 RX ADMIN — HYDROMORPHONE HYDROCHLORIDE: 10 INJECTION, SOLUTION INTRAMUSCULAR; INTRAVENOUS; SUBCUTANEOUS at 14:32

## 2021-02-19 RX ADMIN — KETOROLAC TROMETHAMINE 15 MG: 30 INJECTION, SOLUTION INTRAMUSCULAR at 13:52

## 2021-02-19 ASSESSMENT — PAIN SCALES - GENERAL
PAINLEVEL_OUTOF10: 7
PAINLEVEL_OUTOF10: 5
PAINLEVEL_OUTOF10: 4

## 2021-02-19 ASSESSMENT — PAIN DESCRIPTION - LOCATION: LOCATION: ABDOMEN

## 2021-02-19 ASSESSMENT — PAIN DESCRIPTION - PAIN TYPE: TYPE: SURGICAL PAIN
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
## 2021-02-19 NOTE — FLOWSHEET NOTE
Consult for Emotional Distress. Patient distressed over \"sporadic experiences of her emotions, I'm fine one minute, next I'm tearful. \"    Patient stated tears were of sadness, all other emotions.  assisted patient with life review as requested, discussing her anxieties and frustrations. Patient concerned about having particular things within arms' reach;  asked staff for assistance. Will follow. Ambrocio Pepper  [phone]       02/19/21 1342   Encounter Summary   Services provided to: Patient   Referral/Consult From: Physician   [id number] None at this time   Continue Visiting   (2/19: emotional \"spikes/rollercoaster\" patient stated)   Complexity of Encounter High   Length of Encounter 1 hour;15 minutes   Spiritual Assessment Completed Yes   Spiritual/Islam   Type Spiritual support   Assessment Angry; Anxious; Tearful  (\"Roller coaster of emotions, crying\")   Intervention Nurtured hope; Active listening;Explored feelings, thoughts, concerns   Outcome Connection/belonging; Tearful;Less anxious, less agitated;Coping
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
## 2021-02-19 NOTE — PROGRESS NOTES
Notified Dr Shilo Hall that patient is refusing to get up out of bed or have bowling catheter removed (spoke with Nelly Navarro NP), ok to leave bowling catheter in, see new orders, remains stable, will continue to monitor and treat. Mariano Abrams

## 2021-02-19 NOTE — PROGRESS NOTES
Assessment completed, see doc flow sheet, in bed resting, call light within reach, without distress, PCA pump infusing without difficulty, tolerating well, vital signs stable, denies needs at present time, will continue to monitor and treat. Kenny Verdugo

## 2021-02-19 NOTE — PROGRESS NOTES
Attempted to have patient dangle on side of the bed. Pt was reluctant. Educated pt on importance of activity and benefits of dangling. Pt became very angry and refused activity.

## 2021-02-19 NOTE — PROGRESS NOTES
Dr. Maonlo Mc notified via [employer] at [phone], \"70 y.o. F. s/p exp. lap with sigmoidectomy. Pt does not have insulin orders per colon bundle protocol. Orders? Thank you. \" Awaiting response.
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
## 2021-02-19 NOTE — PROGRESS NOTES
Infectious Disease Follow up Notes    CC :  Perforated colon     Antibiotics:   Zosyn 4.5 q8    Admit Date:   2/15/2021  Hospital Day: 5    Subjective:   She remains afebrile. Pain controlled with PCA. She feels overwhelmed today. Objective:     Patient Vitals for the past 8 hrs:   BP Temp Temp src Pulse Resp   02/19/21 1539     16   02/19/21 1500 (!) 138/93 98.1 °F (36.7 °C) Oral 95 18   02/19/21 1143     13       EXAM:  Alert, oriented, NAD  Skin warm, dry. No focal rash  Abd tender.   Drains in place     LINE: IV site ok       Scheduled Meds:   ketorolac  15 mg Intravenous Q6H    famotidine (PEPCID) injection  20 mg Intravenous BID    sodium chloride flush  10 mL Intravenous 2 times per day    piperacillin-tazobactam  4,500 mg Intravenous Q8H       Continuous Infusions:   insulin      dextrose      HYDROmorphone      sodium chloride 1,000 mL (02/19/21 1617)          Data Review:    Lab Results   Component Value Date    WBC 8.5 02/19/2021    HGB 11.0 (L) 02/19/2021    HCT 33.4 (L) 02/19/2021    MCV 88.1 02/19/2021     02/19/2021     Lab Results   Component Value Date    CREATININE <0.5 (L) 02/19/2021    BUN 3 (L) 02/19/2021     02/19/2021    K 3.4 (L) 02/19/2021     02/19/2021    CO2 23 02/19/2021       Hepatic Function Panel:   Lab Results   Component Value Date    ALKPHOS 170 02/17/2021    ALT 16 02/17/2021    AST 23 02/17/2021    PROT 5.9 02/17/2021    BILITOT 0.8 02/17/2021    BILIDIR 0.4 02/03/2021    IBILI 0.5 02/03/2021    LABALBU 3.1 02/17/2021       Cultures:   2/1       BC x2 NGTD              UA neg  2/2      Abscess culture light growth mixed enteric marisol and Gp F strep; GS 1+ GPC   2/15 BC x2 NGTD  2/18 COVID NAAT neg         Radiology Review:  All pertinent images / reports were reviewed as a part of this visit.    CT a/p 2/1/21  Impression Perforation of diverticulitis, colitis or malignancy involving the mid   sigmoid colon with adjacent 4.6 x 3.3 x 2.7 cm abscess.  Small amount air in   the abscess cavity may either be due to gas forming infection or fistulous   connection to the sigmoid.      CT a/p 2/15/21   Impression   3.5 x 3.9 x 2.0 cm fluid collection with peripheral enhancement, contacting   the dome of the bladder and sigmoid colon, likely related to abscess or   fistula, increased in size since the prior study.       A pigtail drainage catheter with its tip in the lumen of the fluid   collection.  Mild infiltration of fat and few air bubbles along the track of   the catheter.       Wall thickening at the dome of bladder, may be reactive to the adjacent   abscess or fistula.       Wall thickening of the sigmoid colon with mild surrounding infiltration of   fat, likely related to acute diverticulitis, grossly stable.  Follow-up is   recommended to exclude neoplasm.       Mild dilatation of the appendix, nonspecific, without associated inflammatory   changes.        Assessment:     Patient Active Problem List    Diagnosis Date Noted    Abscess of sigmoid colon     Perforation of sigmoid colon ([facility] [address] 02/01/2021    Melanotic stools 02/01/2021    Unintentional weight loss 02/01/2021    Diverticulosis 02/01/2021    Transaminitis 02/01/2021    Asymptomatic varicose veins of right lower extremity 09/18/2019    Stress reaction 09/18/2019    Lung nodule seen on imaging study 08/06/2018    Neck pain 08/06/2018    Mixed hyperlipidemia 08/06/2018    White coat syndrome without diagnosis of hypertension 08/06/2018    Osteopenia of multiple sites 08/06/2018    Family history of breast cancer in mother 08/06/2018    Primary osteoarthritis of both hips 09/18/2017

## 2021-02-19 NOTE — ANESTHESIA POSTPROCEDURE EVALUATION
Department of Anesthesiology  Postprocedure Note    Patient: Kasi Blanco  MRN: [id number]  YOB: 1950  Date of evaluation: 2/18/2021  Time:  8:29 PM     Procedure Summary     Date: 02/18/21 Room / Location: [address] [place] [address]    Anesthesia Start: 8077 Anesthesia Stop: 6703    Procedure: EXPLORATORY LAPAROTOMY, SIGMOIDECTOMY WITH ANASTOMOSIS (N/A Abdomen) Diagnosis: (-)    Surgeons: Merrill Plaza MD Responsible Provider: Jj Lucia MD    Anesthesia Type: general ASA Status: 2          Anesthesia Type: general    Andressa Phase I: Andressa Score: 10    Andressa Phase II:      Last vitals: Reviewed and per EMR flowsheets.        Anesthesia Post Evaluation    Comments: Postoperative Anesthesia Note    Name:    Kasi Blanco  MRN:      [id number]    Patient Vitals in the past 12 hrs:  02/18/21 1802, BP:114/73, Temp:98.1 °F (36.7 °C), Temp src:Axillary, Pulse:88, Resp:16, SpO2:95 %  02/18/21 1730, BP:118/63, Pulse:82, SpO2:94 %  02/18/21 1715, BP:128/71, Pulse:83, Resp:15, SpO2:96 %  02/18/21 1710, BP:126/82, Pulse:88, Resp:14, SpO2:96 %  02/18/21 1705, BP:130/68, Pulse:82, Resp:(!) 38, SpO2:95 %  02/18/21 1700, BP:130/71, Pulse:83, Resp:16, SpO2:96 %  02/18/21 1655, BP:(!) 147/70, Temp:98.8 °F (37.1 °C), Temp src:Temporal, Pulse:82, Resp:10, SpO2:97 %  02/18/21 1330, BP:(!) 156/70, Temp:98.2 °F (36.8 °C), Temp src:Temporal, Pulse:88, Resp:16, SpO2:99 %  02/18/21 0830, BP:131/68, Temp:97.7 °F (36.5 °C), Temp src:Axillary, Pulse:70, Resp:16, SpO2:98 %     LABS:    CBC  Lab Results       Component                Value               Date/Time                  WBC                      9.9                 02/17/2021 05:44 AM        HGB                      12.1                02/17/2021 05:44 AM        HCT                      36.3                02/17/2021 05:44 AM        PLT                      207                 02/17/2021 05:44 AM   RENAL  Lab Results Component                Value               Date/Time                  NA                       140                 02/17/2021 05:44 AM        K                        3.9                 02/17/2021 05:44 AM        CL                       107                 02/17/2021 05:44 AM        CO2                      24                  02/17/2021 05:44 AM        BUN                      6 (L)               02/17/2021 05:44 AM        CREATININE               <0.5 (L)            02/17/2021 05:44 AM        GLUCOSE                  90                  02/17/2021 05:44 AM   COAGS  Lab Results       Component                Value               Date/Time                  PROTIME                  15.7 (H)            02/02/2021 09:44 AM        INR                      1.35 (H)            02/02/2021 09:44 AM     Intake & Output: In: 2971 (I.V.:1023)  Out: 1915 (Urine:1700; Drains:65)    Nausea & Vomiting:  No    Level of Consciousness:  Awake    Pain Assessment:  Adequate analgesia    Anesthesia Complications:  No apparent anesthetic complications    SUMMARY      Vital signs stable  OK to discharge from Stage I post anesthesia care.   Care transferred from Anesthesiology department on discharge from perioperative area

## 2021-02-19 NOTE — PROGRESS NOTES
[facility] GENERAL SURGERY    Surgery Progress Note           POD # 1    PATIENT NAME: Martine Howell     TODAY'S DATE: 2/19/2021    INTERVAL HISTORY:    Pt  Anxious and tearful at times this AM - doesn't want to get out of bed and feels that she is being \"rushed\" by the staff to do things. OBJECTIVE:   VITALS:  /73   Pulse 85   Temp 97.4 °F (36.3 °C) (Oral)   Resp 13   Ht 5' 5\" (1.651 m)   Wt 127 lb 3.3 oz (57.7 kg)   SpO2 96%   BMI 21.17 kg/m²     INTAKE/OUTPUT:    I/O last 3 completed shifts: In: 2173.5 [I.V.:2173.5]  Out: 1910 [Urine:1625; Drains:135; Blood:150]  No intake/output data recorded. CONSTITUTIONAL:  awake and alert  LUNGS:  no crackles or wheezing  ABDOMEN:    soft, non-distended, tenderness noted appropriate, keegan in place serosanguinous   INCISION: clean, dry    Data:  CBC:   Recent Labs     02/17/21  0544 02/19/21  1116   WBC 9.9 8.5   HGB 12.1 11.0*   HCT 36.3 33.4*    226     BMP:    Recent Labs     02/17/21  0544 02/19/21  1116    137   K 3.9 3.4*    106   CO2 24 23   BUN 6* 3*   CREATININE <0.5* <0.5*   GLUCOSE 90 121*     Hepatic:   Recent Labs     02/17/21  0544   AST 23   ALT 16   BILITOT 0.8   ALKPHOS 170*         ASSESSMENT AND PLAN:  72 y.o. female status post EXPLORATORY LAPAROTOMY, SIGMOIDECTOMY WITH ANASTOMOSIS    GI: NPO for now, await return of GI function  : continue with bowling to monitor I/O  Pulm: encourage IS, discussed at length reasoning behind getting pt out of bed  ID: continue with IV antibiotics. Pain: will add toradol - daily BMP  Anxiety:  service to see pt to provide emotional support - greatly appreciate their assistance. Activity: OOB to chair, discussed at length with pt - PT/OT     Spoke with the pt's daughter this AM via telephone to update her on plan of care etc. She was very appreciative.      Electronically signed by EMA Golden - BUCK     Surgery Staff I have examined this patient and read and agree with the note by Alicja Reynolds CNP from today.     Krillion RAY

## 2021-02-19 NOTE — DISCHARGE INSTR - COC
Continuity of Care Form    Patient Name: Clement Nicole   :  1950  MRN:  [id number]    Admit date:  2/15/2021  Discharge date:  2021    Code Status Order: DNR-CCA   Advance Directives:   [facility] Documentation       Date/Time Healthcare Directive Type of Healthcare Directive Copy in [facility] [address] Agent's Name Healthcare Agent's Phone Number    21 1337  No, patient does not have an advance directive for healthcare treatment -- -- -- -- --    21 0002  No, patient does not have an advance directive for healthcare treatment -- -- -- -- --            Admitting Physician:  Nina Osorio DO  PCP: Claudine Smith MD    Discharging Nurse: [address] [facility]/Room#: 4064/9826-54  Discharging Unit Phone Number: [phone]    Emergency Contact:   Extended Emergency Contact Information  Primary Emergency Contact:   Address: [address] [place] [address] Phone: [phone]  Mobile Phone: [phone]  Relation: Child    Past Surgical History:  Past Surgical History:   Procedure Laterality Date    BREAST ENHANCEMENT SURGERY  7059    silicon injected    CT RETROPERITONEAL PERC DRAIN  2021    CT RETROPERITONEAL PERC DRAIN 2021 Bandar Aviles [facility] SCAN    TUBAL LIGATION         Immunization History:   Immunization History   Administered Date(s) Administered    Pneumococcal Conjugate 13-valent ([id number]) 08/10/2017    Pneumococcal Polysaccharide ([id number]) 2020    Tdap (Boostrix, Adacel) 08/10/2017       Active Problems:  Patient Active Problem List   Diagnosis Code    Primary osteoarthritis of both hips M16.0    Lung nodule seen on imaging study R91.1    Neck pain M54.2    Mixed hyperlipidemia E78.2    White coat syndrome without diagnosis of hypertension R03.0    Osteopenia of multiple sites M85.89  Family history of breast cancer in mother Z80.2    Asymptomatic varicose veins of right lower extremity I83.91    Stress reaction F43.0    Perforation of sigmoid colon (HCC) K63.1    Melanotic stools K92.1    Unintentional weight loss R63.4    Diverticulosis K57.90    Transaminitis R74.01    Abscess of sigmoid colon K63.0       Isolation/Infection:   Isolation            No Isolation          Patient Infection Status       Infection Onset Added Last Indicated Last Indicated By Review Planned Expiration Resolved Resolved By    None active    Resolved    COVID-19 Rule Out 02/18/21 02/18/21 02/18/21 COVID-19, Rapid (Ordered)   02/18/21 Rule-Out Test Resulted            Nurse Assessment:  Last Vital Signs: /73   Pulse 85   Temp 97.4 °F (36.3 °C) (Oral)   Resp 18   Ht 5' 5\" (1.651 m)   Wt 127 lb 3.3 oz (57.7 kg)   SpO2 96%   BMI 21.17 kg/m²     Last documented pain score (0-10 scale): Pain Level: 5  Last Weight:   Wt Readings from Last 1 Encounters:   02/19/21 127 lb 3.3 oz (57.7 kg)     Mental Status:  oriented    IV Access:  - None    Nursing Mobility/ADLs:  Walking   Assisted  Transfer  Assisted  Bathing  Assisted  Dressing  Assisted  Toileting  Assisted  Feeding  Assisted  Med Admin  Assisted  Med Delivery   crushed    Wound Care Documentation and Therapy:        Elimination:  Continence:   · Bowel: Yes  · Bladder: Yes  Urinary Catheter: None   Colostomy/Ileostomy/Ileal Conduit: No       Date of Last BM: 2/26/2021    Intake/Output Summary (Last 24 hours) at 2/19/2021 1108  Last data filed at 2/19/2021 0635  Gross per 24 hour   Intake 2173.5 ml   Output 1910 ml   Net 263.5 ml     I/O last 3 completed shifts: In: 2173.5 [I.V.:2173.5]  Out: 1910 [Urine:1625; Drains:135; Blood:150]    Safety Concerns: At Risk for Falls    Impairments/Disabilities:      None    Nutrition Therapy:  Current Nutrition Therapy:   - Oral Diet:  Low Fiber    Routes of Feeding: Oral  Liquids:  Thin Liquids Daily Fluid Restriction: no  Last Modified Barium Swallow with Video (Video Swallowing Test): not done    Treatments at the Time of Hospital Discharge:   Respiratory Treatments: ***  Oxygen Therapy:  is not on home oxygen therapy. Ventilator:    - No ventilator support    Rehab Therapies: Physical Therapy and Occupational Therapy  Weight Bearing Status/Restrictions: No weight bearing restirctions  Other Medical Equipment (for information only, NOT a DME order):  walker  Other Treatments: ***    Patient's personal belongings (please select all that are sent with patient):  Glasses    RN SIGNATURE:  Electronically signed by Shiloh Alegria RN on 2/27/21 at 2:43 PM EST    CASE MANAGEMENT/SOCIAL WORK SECTION    Inpatient Status Date: 2/19/21    Readmission Risk Assessment Score:  Readmission Risk              Risk of Unplanned Readmission:        10           Discharging to Facility/ Agency   · Name: [facility]  · Address:  · Phone:[phone]  · XSN:[phone]        / signature: Electronically signed by Dimitri Salazar RN on 2/27/21 at 2:12 PM EST    PHYSICIAN SECTION    Prognosis: Good    Condition at Discharge: Stable    Rehab Potential (if transferring to Rehab): Good    Recommended Labs or Other Treatments After Discharge: See below  Recommended Follow-up, Labs or Other Treatments After Discharge:    PT/OT  Follow up with Dr. Josemanuel Stearns in 1-2 weeks             Physician Certification: I certify the above information and transfer of Martine Howell  is necessary for the continuing treatment of the diagnosis listed and that she requires [place] for less 30 days.      Update Admission H&P: No change in H&P    PHYSICIAN SIGNATURE:  Electronically signed by Gerber Chua MD on 2/27/21 at 12:55 PM EST

## 2021-02-19 NOTE — CARE COORDINATION
Chart reviewed day 4. POD 1. Sigmoidectomy with anastomosis. Did not get ostomy. Care managed per surgery and IM. spoke with Piero Olivas NP. Likely here thru weekend for return of GI function. [facility] following for potential DCP needs.  Susanna Fajardo RN

## 2021-02-20 LAB
ANION GAP SERPL CALCULATED.3IONS-SCNC: 5 MMOL/L (ref 3–16)
BUN BLDV-MCNC: 4 MG/DL (ref 7–20)
CALCIUM SERPL-MCNC: 8.7 MG/DL (ref 8.3–10.6)
CHLORIDE BLD-SCNC: 107 MMOL/L (ref 99–110)
CO2: 28 MMOL/L (ref 21–32)
CREAT SERPL-MCNC: <0.5 MG/DL (ref 0.6–1.2)
GFR AFRICAN AMERICAN: >60
GFR NON-AFRICAN AMERICAN: >60
GLUCOSE BLD-MCNC: 100 MG/DL (ref 70–99)
GLUCOSE BLD-MCNC: 74 MG/DL (ref 70–99)
GLUCOSE BLD-MCNC: 80 MG/DL (ref 70–99)
GLUCOSE BLD-MCNC: 81 MG/DL (ref 70–99)
GLUCOSE BLD-MCNC: 81 MG/DL (ref 70–99)
GLUCOSE BLD-MCNC: 84 MG/DL (ref 70–99)
GLUCOSE BLD-MCNC: 84 MG/DL (ref 70–99)
GLUCOSE BLD-MCNC: 91 MG/DL (ref 70–99)
PERFORMED ON: ABNORMAL
PERFORMED ON: NORMAL
POTASSIUM SERPL-SCNC: 2.9 MMOL/L (ref 3.5–5.1)
POTASSIUM SERPL-SCNC: 3.1 MMOL/L (ref 3.5–5.1)
SODIUM BLD-SCNC: 140 MMOL/L (ref 136–145)

## 2021-02-20 PROCEDURE — 94761 N-INVAS EAR/PLS OXIMETRY MLT: CPT

## 2021-02-20 PROCEDURE — 6360000002 HC RX W HCPCS: Performed by: SURGERY

## 2021-02-20 PROCEDURE — 99024 POSTOP FOLLOW-UP VISIT: CPT | Performed by: SURGERY

## 2021-02-20 PROCEDURE — 2700000000 HC OXYGEN THERAPY PER DAY

## 2021-02-20 PROCEDURE — 36415 COLL VENOUS BLD VENIPUNCTURE: CPT

## 2021-02-20 PROCEDURE — 6360000002 HC RX W HCPCS: Performed by: INTERNAL MEDICINE

## 2021-02-20 PROCEDURE — 2580000003 HC RX 258: Performed by: SURGERY

## 2021-02-20 PROCEDURE — 2580000003 HC RX 258: Performed by: INTERNAL MEDICINE

## 2021-02-20 PROCEDURE — 2580000003 HC RX 258: Performed by: CLINICAL NURSE SPECIALIST

## 2021-02-20 PROCEDURE — 2500000003 HC RX 250 WO HCPCS: Performed by: CLINICAL NURSE SPECIALIST

## 2021-02-20 PROCEDURE — 1200000000 HC SEMI PRIVATE

## 2021-02-20 PROCEDURE — 6360000002 HC RX W HCPCS: Performed by: CLINICAL NURSE SPECIALIST

## 2021-02-20 PROCEDURE — 80048 BASIC METABOLIC PNL TOTAL CA: CPT

## 2021-02-20 PROCEDURE — 6360000002 HC RX W HCPCS: Performed by: NURSE PRACTITIONER

## 2021-02-20 PROCEDURE — 84132 ASSAY OF SERUM POTASSIUM: CPT

## 2021-02-20 PROCEDURE — APPSS45 APP SPLIT SHARED TIME 31-45 MINUTES: Performed by: CLINICAL NURSE SPECIALIST

## 2021-02-20 RX ORDER — POTASSIUM CHLORIDE 7.45 MG/ML
10 INJECTION INTRAVENOUS
Status: COMPLETED | OUTPATIENT
Start: 2021-02-20 | End: 2021-02-21

## 2021-02-20 RX ORDER — SODIUM CHLORIDE 9 MG/ML
INJECTION, SOLUTION INTRAVENOUS CONTINUOUS
Status: DISCONTINUED | OUTPATIENT
Start: 2021-02-20 | End: 2021-02-27 | Stop reason: HOSPADM

## 2021-02-20 RX ORDER — HYDROXYZINE HYDROCHLORIDE 10 MG/1
25 TABLET, FILM COATED ORAL 4 TIMES DAILY PRN
Status: DISCONTINUED | OUTPATIENT
Start: 2021-02-20 | End: 2021-02-20

## 2021-02-20 RX ORDER — POTASSIUM CHLORIDE 7.45 MG/ML
10 INJECTION INTRAVENOUS
Status: COMPLETED | OUTPATIENT
Start: 2021-02-20 | End: 2021-02-20

## 2021-02-20 RX ORDER — SODIUM CHLORIDE 9 MG/ML
1000 INJECTION, SOLUTION INTRAVENOUS CONTINUOUS
Status: DISCONTINUED | OUTPATIENT
Start: 2021-02-20 | End: 2021-02-20

## 2021-02-20 RX ORDER — HYDROXYZINE HYDROCHLORIDE 10 MG/1
25 TABLET, FILM COATED ORAL 4 TIMES DAILY PRN
Status: DISCONTINUED | OUTPATIENT
Start: 2021-02-20 | End: 2021-02-27 | Stop reason: HOSPADM

## 2021-02-20 RX ADMIN — POTASSIUM CHLORIDE 10 MEQ: 7.46 INJECTION, SOLUTION INTRAVENOUS at 22:11

## 2021-02-20 RX ADMIN — SODIUM CHLORIDE, PRESERVATIVE FREE 10 ML: 5 INJECTION INTRAVENOUS at 08:42

## 2021-02-20 RX ADMIN — POTASSIUM CHLORIDE 10 MEQ: 7.46 INJECTION, SOLUTION INTRAVENOUS at 14:15

## 2021-02-20 RX ADMIN — PIPERACILLIN AND TAZOBACTAM 3375 MG: 3; .375 INJECTION, POWDER, FOR SOLUTION INTRAVENOUS at 02:39

## 2021-02-20 RX ADMIN — PIPERACILLIN AND TAZOBACTAM 3375 MG: 3; .375 INJECTION, POWDER, FOR SOLUTION INTRAVENOUS at 17:33

## 2021-02-20 RX ADMIN — KETOROLAC TROMETHAMINE 15 MG: 30 INJECTION, SOLUTION INTRAMUSCULAR at 17:37

## 2021-02-20 RX ADMIN — HYDROMORPHONE HYDROCHLORIDE 0.2 MG: 10 INJECTION, SOLUTION INTRAMUSCULAR; INTRAVENOUS; SUBCUTANEOUS at 19:24

## 2021-02-20 RX ADMIN — FAMOTIDINE 20 MG: 10 INJECTION, SOLUTION INTRAVENOUS at 20:07

## 2021-02-20 RX ADMIN — PIPERACILLIN AND TAZOBACTAM 3375 MG: 3; .375 INJECTION, POWDER, FOR SOLUTION INTRAVENOUS at 08:48

## 2021-02-20 RX ADMIN — SODIUM CHLORIDE: 9 INJECTION, SOLUTION INTRAVENOUS at 20:07

## 2021-02-20 RX ADMIN — POTASSIUM CHLORIDE 10 MEQ: 7.46 INJECTION, SOLUTION INTRAVENOUS at 11:47

## 2021-02-20 RX ADMIN — KETOROLAC TROMETHAMINE 15 MG: 30 INJECTION, SOLUTION INTRAMUSCULAR at 06:26

## 2021-02-20 RX ADMIN — FAMOTIDINE 20 MG: 10 INJECTION, SOLUTION INTRAVENOUS at 08:41

## 2021-02-20 RX ADMIN — SODIUM CHLORIDE 1000 ML: 9 INJECTION, SOLUTION INTRAVENOUS at 10:50

## 2021-02-20 RX ADMIN — ONDANSETRON 4 MG: 2 INJECTION INTRAMUSCULAR; INTRAVENOUS at 12:34

## 2021-02-20 RX ADMIN — KETOROLAC TROMETHAMINE 15 MG: 30 INJECTION, SOLUTION INTRAMUSCULAR at 12:38

## 2021-02-20 RX ADMIN — POTASSIUM CHLORIDE 10 MEQ: 7.46 INJECTION, SOLUTION INTRAVENOUS at 11:42

## 2021-02-20 ASSESSMENT — PAIN SCALES - GENERAL
PAINLEVEL_OUTOF10: 5
PAINLEVEL_OUTOF10: 6
PAINLEVEL_OUTOF10: 6

## 2021-02-20 NOTE — PROGRESS NOTES
Assessment completed and documented. VSS. A/ox4. C/o 5/10, pca pump demand only. Patient educated on use. x1 assist with walker/cane. bedalarm on for safety. scds in place. Cristobal cath in place, patent. Bed locked and in lowest position. Bedside table and call light within reach. Denies further needs at this time. Patient up and dangled legs during shift for 10 minutes. Was able to move beds x3 assist due to bed malfunction. Repositioned. Patient comfortable at this time.

## 2021-02-20 NOTE — OP NOTE
[facility]                                OPERATIVE REPORT    PATIENT NAME: Alyssa Gutiérrez                   :        1950  MED REC NO:   [id number]                          ROOM:       3001  ACCOUNT NO:   [de-identified]                           ADMIT DATE: 02/15/2021  PROVIDER:     Layla Man MD    DATE OF PROCEDURE:  2021    PREOPERATIVE DIAGNOSES:  Sigmoid diverticulitis with an abscess. POSTOPERATIVE DIAGNOSES:  Sigmoid diverticulitis with an abscess. PROCEDURES PERFORMED:  Exploratory laparotomy with sigmoidectomy and  stapled colorectal anastomosis. SURGEON:  Layla Man MD    ANESTHESIA:  General.    ESTIMATED BLOOD LOSS:  Less than 50 mL. SPECIMENS:  1. Sigmoid colon, open end equals proximal margin. 2.  Distal anastomotic donut. 3.  Proximal anastomotic donut. COUNTS:  Sponge and needle counts were correct. INDICATIONS:  The patient is a 80-year-old female who had presented  previously with apparent severe episode of sigmoid diverticulitis with  an associated pericolonic abscess. Percutaneous drain had been placed  into the abscess. The patient had been discharged to home on  antibiotics to try to allow the abscess to fully subside. However, she  re-presented back to the hospital with the recurring and increasing pain  in the lower abdomen and as well as feculent drainage within the drain  tubing and around the drain onto the skin. We therefore recommended  proceeding with surgical intervention. FINDINGS:  1. Focal area of severe inflammation of the mid sigmoid, adherent  anterolaterally to the pelvic wall with a small walled-off abscess. Benign-appearing colon proximally and rectum distally. 2.  Sigmoidectomy with stapled 29 mm EEA colorectal anastomosis with no  leak, with insufflation.     DESCRIPTION OF THE PROCEDURE:  After informed consent was obtained, the patient was taken to the operating room and placed in the supine  position. Preoperative antibiotics had been initiated in the holding  area. Athrombic pumps were placed in the legs. General anesthesia was  administered without difficulty. A Cristobal catheter was placed to gravity  in a sterile fashion. The abdomen and perineum were prepped and draped  in a sterile fashion. A low midline incision was created sharply and carried down to the  abdominal wall layers. The abdominal cavity was explored. The omentum  was drawn down and adherent over the distal sigmoid colon. The sigmoid  colon was focally adherent to the dome of the bladder and to the  anterolateral sidewall of the pelvis. We used a vessel sealing device  to transect the omentum to release it and rotate it upwards. The  sigmoid and descending colon proximal to the inflamed area was grossly  unremarkable. We now began to carefully incise the lateral attachments  of the sigmoid colon and work towards the area where it was [place]. The combination of blunt and sharp dissection was used to break the  colon away from the sidewall, revealing the small residual abscess that  was aspirated clear. When the colon was fully released, we could see  the rectum down below with the benign in appearance as well. I thought  that this focal area and believed inflamed colon could be resected with  an immediate reanastomosis. A window was created to the mesentery at the junction of the sigmoid  colon and rectum. A stapler was used to transect the colon at this  point and a vessel sealing device was used to mobilize and release the  mesentery underneath the diseased portion upto the point of planned  transection in the proximal sigmoid colon. The colon was then  transected sharply at this point and passed off. A pursestring device  was placed around the open end of the proximal colon.   The colon was sized to a 29 mm stapler and the anvil of the stapler inserted into the  open end of the colon. The pursestring device was secured around the  anvil and excessive epiploic fat was cleared off the end of the colon. The assistant now went below to the perineum. The anal sphincter was  gently dilated and the sizers were passed up easily to the end of the  rectum. The stapler was now inserted, but it had difficulty passing to  the mid proximal portion of the rectum due to a very small degree of  chronic inflammation causing a mild stricture. Continued efforts to try  to introduce the stapler at this point was starting to cause a tear on  the wall of the rectum. I therefore, rotated the end of the stapler  upwards to efface the anterior wall of the rectum. We then opened the  stapler through the anterior wall of the rectum on the spot. The small  tear that has been created in the rectum was positioned inside of the  staple line. The proximal colon was brought down and attached to the  stapler. The stapler was closed down and fired. Two intact donuts of  tissue were noted inside the stapler upon removal.    A standard leak test was performed with the assistant using the  proctoscope from below. With the insufflation of the rectum across the  anastomosis, I did not see any evidence of leaking. However, as a  precaution because of the tear that had been seen in the area prior to  firing the stapler, I placed several silk sutures on the right side of  the anastomosis over the area, where the tear had been located  previously. At this point, I was satisfied with the repair. A  19-Korean round drain was placed through the right lower quadrant stab  incision and positioned into the pelvis adjacent to the anastomosis. The omentum was pulled back down over the bowels. The midline incision  was closed with two running looped 0 PDS sutures.   The wound was copiously irrigated. The skin was closed with staples. The drain was  secured with a silk stitch. The patient was then extubated and taken to  the recovery room in stable condition.         Kimberley Milligan MD    D: 02/20/2021 10:18:05       T: 02/20/2021 14:08:33     [id number]  Job#: [id number]     Doc#: [id number]    CC:

## 2021-02-20 NOTE — PLAN OF CARE
Problem: Falls - Risk of:  Goal: Will remain free from falls  Description: Will remain free from falls  Outcome: Ongoing  Goal: Absence of physical injury  Description: Absence of physical injury  Outcome: Ongoing     Problem: Falls - Risk of: Intervention: Assess risk factors for falls  Note: Pt is high fall risk  Intervention: Postfall assessment  Note: No falls sustained thus far this shift  Intervention: Manage a safe environment  Note: Call light within reach. Bed side table within reach. Wheels locked. Bed in lowest position. Bed check in place. Pt instructed to call out for assistance. Pt expressed understanding & calls out appropriately. Intervention: Toileting assistance  Note: Cristobal cath in place.

## 2021-02-20 NOTE — PROGRESS NOTES
[facility] GENERAL SURGERY    Surgery Progress Note           POD # 2    PATIENT NAME: Giovana Bee     TODAY'S DATE: 2/20/2021    INTERVAL HISTORY:    Pt doing much better this AM - happy, smiling. Reports that she had a better night and was able to get up out of bed. She is getting up to bedside commode this AM as she feels like she may have to have a BM. Pain is improved. Itching improved as well. OBJECTIVE:   VITALS:  /71   Pulse 79   Temp 97.7 °F (36.5 °C) (Axillary)   Resp 12   Ht 5' 5\" (1.651 m)   Wt 133 lb 2.5 oz (60.4 kg)   SpO2 99%   BMI 22.16 kg/m²     INTAKE/OUTPUT:    I/O last 3 completed shifts: In: 1053.3 [P.O.:120; I.V.:933.3]  Out: 650 [Urine:650]  I/O this shift:  In: 10.4 [I.V.:10.4]  Out: -               CONSTITUTIONAL:  awake and alert  LUNGS:  no crackles or wheezing  ABDOMEN:    soft, non-distended, tenderness noted appropriate, keegan in place serosanguinous   INCISION: clean, dry    Data:  CBC:   Recent Labs     02/19/21  1116   WBC 8.5   HGB 11.0*   HCT 33.4*        BMP:    Recent Labs     02/19/21  1116 02/20/21  0616    140   K 3.4* 2.9*    107   CO2 23 28   BUN 3* 4*   CREATININE <0.5* <0.5*   GLUCOSE 121* 91     Hepatic:   No results for input(s): AST, ALT, ALB, BILITOT, ALKPHOS in the last 72 hours. ASSESSMENT AND PLAN:  72 y.o. female status post EXPLORATORY LAPAROTOMY, SIGMOIDECTOMY WITH ANASTOMOSIS    GI: NPO for now, await return of GI function  : continue with bowling to monitor I/O  Pulm: encourage IS, increase time up in chair  ID: continue with IV antibiotics. Pain: controlled   Anxiety: better today, greatly appreciate spiritual care seeing patient.     Activity: OOB to chair,  - PT/OT   Hypokalemia: replace today - internal medicine paged by nursing staff this AM - not yet replaced, spoke with nurse and will write for replacements this AM      Electronically signed by EMA Garcia - CNP I agree with the progress note and care plan of Jaswant Shin CNP.     DESMOND DAVID

## 2021-02-20 NOTE — PROGRESS NOTES
Pt raised herself to edge of bed from lying position with coaching only. Pt sat on edge of bed and did gentle exercises and stretches of her arms and legs for twenty mins. Pt was encouraged by how well she did and is looking forward to getting up again tomorrow. Pt needed assistance lifting her legs back into bed, but she was able to fully reposition herself the rest of the way. Pt utilized PCA pump after returning to bed. Very pleasant and chatty.    Electronically signed by Quique Deshpande RN on 2/20/2021 at 6:45 PM

## 2021-02-20 NOTE — PLAN OF CARE
Pt remained free of falls. Call light within reach. Bed alarm on. Non skid footwear in place. Bed locked and in lowest position. A/ox4, calls out appropriately.
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
## 2021-02-20 NOTE — PROGRESS NOTES
Potassium chloride started; running @ 50/hr per pt request. Rate currently tolerated.  Electronically signed by Sera Noble RN on 2/20/2021 at 11:48 AM

## 2021-02-20 NOTE — PROGRESS NOTES
Pt A&O x4. VSS. Denies dyspnea. Utilizing PCA pump for surgical pain. BS hypoactive x4; denies flatus. Small amt old drainage under midline mepilex. RUPAL to R abd with sanguinous drainage; bulb compressed. Cristobal cath draining clear, yellow urine. 1L O2 for comfort. Call light within reach. Bed side table within reach. Wheels locked. Bed in lowest position. Bed check in place. Pt instructed to call out for assistance. Pt expressed understanding & calls out appropriately. Shift assessment complete. All care cont per orders.  Electronically signed by Allen Neal RN on 2/20/2021 at 4:35 PM

## 2021-02-21 LAB
GLUCOSE BLD-MCNC: 81 MG/DL (ref 70–99)
GLUCOSE BLD-MCNC: 81 MG/DL (ref 70–99)
GLUCOSE BLD-MCNC: 96 MG/DL (ref 70–99)
PERFORMED ON: NORMAL
POTASSIUM SERPL-SCNC: 3.3 MMOL/L (ref 3.5–5.1)

## 2021-02-21 PROCEDURE — APPSS45 APP SPLIT SHARED TIME 31-45 MINUTES: Performed by: CLINICAL NURSE SPECIALIST

## 2021-02-21 PROCEDURE — 97535 SELF CARE MNGMENT TRAINING: CPT

## 2021-02-21 PROCEDURE — 97166 OT EVAL MOD COMPLEX 45 MIN: CPT

## 2021-02-21 PROCEDURE — 84132 ASSAY OF SERUM POTASSIUM: CPT

## 2021-02-21 PROCEDURE — 97530 THERAPEUTIC ACTIVITIES: CPT

## 2021-02-21 PROCEDURE — 99024 POSTOP FOLLOW-UP VISIT: CPT | Performed by: SURGERY

## 2021-02-21 PROCEDURE — 1200000000 HC SEMI PRIVATE

## 2021-02-21 PROCEDURE — 2580000003 HC RX 258: Performed by: INTERNAL MEDICINE

## 2021-02-21 PROCEDURE — 2580000003 HC RX 258: Performed by: SURGERY

## 2021-02-21 PROCEDURE — 36415 COLL VENOUS BLD VENIPUNCTURE: CPT

## 2021-02-21 PROCEDURE — 6370000000 HC RX 637 (ALT 250 FOR IP): Performed by: NURSE PRACTITIONER

## 2021-02-21 PROCEDURE — 6360000002 HC RX W HCPCS: Performed by: NURSE PRACTITIONER

## 2021-02-21 PROCEDURE — 6360000002 HC RX W HCPCS: Performed by: CLINICAL NURSE SPECIALIST

## 2021-02-21 PROCEDURE — 6360000002 HC RX W HCPCS: Performed by: INTERNAL MEDICINE

## 2021-02-21 PROCEDURE — 2500000003 HC RX 250 WO HCPCS: Performed by: CLINICAL NURSE SPECIALIST

## 2021-02-21 PROCEDURE — 97162 PT EVAL MOD COMPLEX 30 MIN: CPT

## 2021-02-21 RX ORDER — POTASSIUM CHLORIDE 7.45 MG/ML
10 INJECTION INTRAVENOUS
Status: COMPLETED | OUTPATIENT
Start: 2021-02-21 | End: 2021-02-21

## 2021-02-21 RX ADMIN — HYDROXYZINE HYDROCHLORIDE 25 MG: 10 TABLET, FILM COATED ORAL at 22:15

## 2021-02-21 RX ADMIN — PIPERACILLIN AND TAZOBACTAM 3375 MG: 3; .375 INJECTION, POWDER, FOR SOLUTION INTRAVENOUS at 02:07

## 2021-02-21 RX ADMIN — POTASSIUM CHLORIDE 10 MEQ: 7.46 INJECTION, SOLUTION INTRAVENOUS at 00:15

## 2021-02-21 RX ADMIN — POTASSIUM CHLORIDE 10 MEQ: 7.46 INJECTION, SOLUTION INTRAVENOUS at 10:05

## 2021-02-21 RX ADMIN — FAMOTIDINE 20 MG: 10 INJECTION, SOLUTION INTRAVENOUS at 21:29

## 2021-02-21 RX ADMIN — SODIUM CHLORIDE: 9 INJECTION, SOLUTION INTRAVENOUS at 06:32

## 2021-02-21 RX ADMIN — PIPERACILLIN AND TAZOBACTAM 3375 MG: 3; .375 INJECTION, POWDER, FOR SOLUTION INTRAVENOUS at 10:08

## 2021-02-21 RX ADMIN — FAMOTIDINE 20 MG: 10 INJECTION, SOLUTION INTRAVENOUS at 10:04

## 2021-02-21 RX ADMIN — KETOROLAC TROMETHAMINE 15 MG: 30 INJECTION, SOLUTION INTRAMUSCULAR at 17:56

## 2021-02-21 RX ADMIN — PIPERACILLIN AND TAZOBACTAM 3375 MG: 3; .375 INJECTION, POWDER, FOR SOLUTION INTRAVENOUS at 17:43

## 2021-02-21 RX ADMIN — SODIUM CHLORIDE: 9 INJECTION, SOLUTION INTRAVENOUS at 15:00

## 2021-02-21 RX ADMIN — KETOROLAC TROMETHAMINE 15 MG: 30 INJECTION, SOLUTION INTRAMUSCULAR at 00:15

## 2021-02-21 RX ADMIN — KETOROLAC TROMETHAMINE 15 MG: 30 INJECTION, SOLUTION INTRAMUSCULAR at 11:36

## 2021-02-21 RX ADMIN — POTASSIUM CHLORIDE 10 MEQ: 7.46 INJECTION, SOLUTION INTRAVENOUS at 11:13

## 2021-02-21 RX ADMIN — POTASSIUM CHLORIDE 10 MEQ: 7.46 INJECTION, SOLUTION INTRAVENOUS at 01:56

## 2021-02-21 RX ADMIN — POTASSIUM CHLORIDE 10 MEQ: 7.46 INJECTION, SOLUTION INTRAVENOUS at 12:42

## 2021-02-21 RX ADMIN — KETOROLAC TROMETHAMINE 15 MG: 30 INJECTION, SOLUTION INTRAMUSCULAR at 06:31

## 2021-02-21 ASSESSMENT — PAIN DESCRIPTION - PAIN TYPE
TYPE: ACUTE PAIN;SURGICAL PAIN
TYPE: ACUTE PAIN;SURGICAL PAIN

## 2021-02-21 ASSESSMENT — PAIN SCALES - GENERAL
PAINLEVEL_OUTOF10: 6
PAINLEVEL_OUTOF10: 6
PAINLEVEL_OUTOF10: 9
PAINLEVEL_OUTOF10: 8

## 2021-02-21 ASSESSMENT — PAIN DESCRIPTION - ORIENTATION: ORIENTATION: MID

## 2021-02-21 ASSESSMENT — PAIN DESCRIPTION - LOCATION: LOCATION: ABDOMEN

## 2021-02-21 ASSESSMENT — PAIN DESCRIPTION - DESCRIPTORS: DESCRIPTORS: SHARP

## 2021-02-21 NOTE — PROGRESS NOTES
Physical Therapy    Facility/Department: [facility] C3 TELE/MED SURG/ONC  Initial Assessment/Treatment    NAME: Hassie Schaumann  : 1950  MRN: [id number]    Date of Service: 2021    Discharge Recommendations:  Subacute/Skilled Nursing Facility   PT Equipment Recommendations  Equipment Needed: No  Other: defer to next level of care    Assessment   Body structures, Functions, Activity limitations: Decreased functional mobility ; Increased pain;Decreased balance;Decreased ROM; Decreased strength;Decreased safe awareness;Decreased endurance  Assessment: Pt is a 72 y.o. female admitted to [facility] secondary to abscess of sigmoid colon s/p ex lap with sigmoidectomy with anastamisis . Pt report she lives alone in one level apartment with level entry and is typically MI with functional mobility/gait with SPC. On arrival once PT introduced self pt immediately tearful and resistant reporting she wants to wait until 7 pm to do therapy with RN, pt agreeable to sit EOB with maximal encouragement but once again tearful with set up of chair. \"I can't get up to the chair, that is inhumane, I can only do baby steps, the doctor knows I'm behind\". Pt extensively educated regarding role of PT importance of mobility and attempting OOB mobility, pt resistant to all education, argumentative and insistent on controlling session despite PT recommendations for improved safety with mobility. Pt frequently telling PT/OT to slow down and not pull on her even with PT/OT informing pt of every step of process or not even touching pt. Pt requires maxA x 2 to get to EOB and modA to attempt to stand, pt refuses use of SW, wants to complete 180 degree pivot to [facility] but is unable to stand to pivot. Pt will benefit from continued skilled PT in acute care setting to address above deficits. D/t pt current deficits and no 24hr assist at d/c recommend SNF.   Treatment Diagnosis: Impaired functional mobility and gait Specific instructions for Next Treatment: Progress mobility as tolerated  Prognosis: Fair  Decision Making: Medium Complexity  PT Education: Goals; General Safety;PT Role;Disease Specific Education;Plan of Care; Functional Mobility Training;Home Exercise Program;Precautions; Injury Prevention;Transfer Training  Patient Education: Pt educated regarding importance of OOB mobility and sitting in chair, safe techniques with use of AD. Pt resistant to education, demonstrates no evidence of learning, will require reinforcement. Barriers to Learning: Resistant to all education  REQUIRES PT FOLLOW UP: Yes  Activity Tolerance  Activity Tolerance: Patient limited by pain; Patient limited by endurance  Activity Tolerance: Pt very self limiting, resistant to all education, argumentative, tearful and insistent on controlling session despite education from PT/OT and education regarding safety. Wanting PT take BP on arrival d/t being worked up vs once calmed down. Patient Diagnosis(es): The primary encounter diagnosis was Intra-abdominal abscess ([facility] [address] Diagnoses of Acute diverticulitis, Generalized weakness, and Noncompliance with medication regimen were also pertinent to this visit. has a past medical history of Acute hemorrhoid, Depression, Elevated blood pressure reading in office with white coat syndrome, without diagnosis of hypertension, and Osteoarthritis. has a past surgical history that includes Tubal ligation; Breast enhancement surgery (1968); CT PERITONEAL/RETROPERITONEAL PERC DRAIN (2/2/2021); and Small intestine surgery (N/A, 2/18/2021). Restrictions  Restrictions/Precautions  Restrictions/Precautions: Fall Risk, Up as Tolerated  Required Braces or Orthoses?: No  Position Activity Restriction  Other position/activity restrictions:  Cristobal, RUPAL, IV, PCA pump, abdominal binder  Vision/Hearing  Vision: Impaired  Vision Exceptions: Wears glasses for reading  Hearing: Within functional limits     Subjective General  Chart Reviewed: Yes  Patient assessed for rehabilitation services?: Yes  Additional Pertinent Hx: Hx: sepsis, sigmoid absecess  Response To Previous Treatment: Not applicable  Family / Caregiver Present: No  Referring Practitioner: EMA العراقي CNP  Referral Date : 02/21/21  Diagnosis: Sigmoid abscess S/p Ex lap, sigmoidectomy, anastamosis 2/18  Follows Commands: Within Functional Limits  General Comment  Comments: RN cleared pt for session  Subjective  Subjective: Pt resting in bed on approach, immediately tearful once educated regarding PT/OT, very argumentative and resistant to all education regarding role of PT and importance of mobility  Pain Screening  Patient Currently in Pain: Yes  Pain Assessment  Pain Assessment: 0-10  Pain Level: 6  Pain Type: Acute pain;Surgical pain  Pain Location: Abdomen  Non-Pharmaceutical Pain Intervention(s): Ambulation/Increased Activity;Repositioned; Therapeutic presence;Distraction  Vital Signs  Pulse: 89  BP: (!) 158/75  BP Location: Left upper arm  MAP (mmHg): 103  Patient Position: Semi fowlers  Patient Currently in Pain: Yes  Oxygen Therapy  SpO2: 98 %  Pulse Oximeter Device Mode: Intermittent  O2 Device: None (Room air)  Pre Treatment Pain Screening  Intervention List: Patient able to continue with treatment(pt utilizing PCA pump during session)    Orientation  Orientation  Overall Orientation Status: Within Normal Limits  Social/Functional History  Social/Functional History  Lives With: Alone  Type of Home: Apartment  Home Layout: One level  Home Access: Level entry  Bathroom Shower/Tub: Tub/Shower unit  Bathroom Toilet: Handicap height(Pt reports she stands to urinate and have BM)  Bathroom Equipment: Grab bars in shower, Grab bars around toilet  Home Equipment: Cane, Standard walker  ADL Assistance: [address]: Independent  Homemaking Responsibilities: Yes  Meal Prep Responsibility: Primary  Laundry Responsibility: Primary Current Treatment Recommendations: Strengthening, Home Exercise Program, Safety Education & Training, Balance Training, Endurance Training, Patient/Caregiver Education & Training, Functional Mobility Training, Transfer Training, Gait Training, Positioning  Safety Devices  Type of devices: All fall risk precautions in place, Bed alarm in place, Call light within reach, Nurse notified, Gait belt, Patient at risk for falls, Left in bed  Restraints  Initially in place: No      AM-PAC Score  AM-PAC Inpatient Mobility Raw Score : 8 (02/21/21 1120)  AM-PAC Inpatient T-Scale Score : 28.52 (02/21/21 1120)  Mobility Inpatient CMS 0-100% Score: 86.62 (02/21/21 1120)  Mobility Inpatient CMS G-Code Modifier : CM (02/21/21 1120)          Goals  Short term goals  Time Frame for Short term goals: 1 week 2/28/21 (unless otherwise specified)  Short term goal 1: Pt will complete supine to/from sit with modA  Short term goal 2: Pt will complete sit to/from stand and bed <> chair t/f with LRAD with modA x 1  Short term goal 3: Pt will tolerate gait assessment and appropriate goal to be set  Short term goal 4: 2/25/21: Pt will participate in 12-15 reps BLE exercises to increase strength and increase I with functional mobility  Patient Goals   Patient goals : \"I want to take baby steps, sit up on the edge of the bed only\"       Therapy Time   Individual Concurrent Group Co-treatment   Time In 0858         Time Out 0934         Minutes 36         Timed Code Treatment Minutes: 26 Minutes(10 minutes for eval)     If pt is unable to be seen after this session, please let this note serve as discharge summary. Please see case management note for discharge disposition. Thank you.     Ana Patel, PT, DPT

## 2021-02-21 NOTE — PROGRESS NOTES
[facility] GENERAL SURGERY    Surgery Progress Note           POD # 3    PATIENT NAME: Jan Potter     TODAY'S DATE: 2/21/2021    INTERVAL HISTORY:    Pt doing well this AM - states her pain is controlled and she is using PCA prior to moving around and getting up. Denies nausea, no flatus yet, but \"feels like she may soon\"     OBJECTIVE:   VITALS:  /69   Pulse 88   Temp 97.6 °F (36.4 °C) (Axillary)   Resp 14   Ht 5' 5\" (1.651 m)   Wt 136 lb 12.8 oz (62.1 kg)   SpO2 97%   BMI 22.76 kg/m²     INTAKE/OUTPUT:    I/O last 3 completed shifts: In: 2918.8 [P.O.:200; I.V.:1909.5; IV Piggyback:809.4]  Out: 1050 [Urine:1000; Drains:50]  No intake/output data recorded. CONSTITUTIONAL:  awake and alert  LUNGS:  no crackles or wheezing  ABDOMEN:    soft, non-distended, tenderness noted appropriate, keegan in place serosanguinous   INCISION: scant amt drainage on lower portion    Data:  CBC:   Recent Labs     02/19/21  1116   WBC 8.5   HGB 11.0*   HCT 33.4*        BMP:    Recent Labs     02/19/21  1116 02/20/21  0616 02/20/21  2054 02/21/21  0440    140  --   --    K 3.4* 2.9* 3.1* 3.3*    107  --   --    CO2 23 28  --   --    BUN 3* 4*  --   --    CREATININE <0.5* <0.5*  --   --    GLUCOSE 121* 91  --   --      Hepatic:   No results for input(s): AST, ALT, ALB, BILITOT, ALKPHOS in the last 72 hours. ASSESSMENT AND PLAN:  72 y.o. female status post EXPLORATORY LAPAROTOMY, SIGMOIDECTOMY WITH ANASTOMOSIS    GI: NPO for now, await return of GI function - clear liquids soon, popsicles today. : continue with bowling to monitor I/O  Pulm: encourage IS, increase time up in chair  ID: continue with IV antibiotics. Pain: controlled   Anxiety: better today, greatly appreciate spiritual care seeing patient.     Activity: OOB to chair,  - PT/OT   Hypokalemia: replace today       Electronically signed by EMA Norris - CNP I agree with the progress note and care plan of Isabel Marvin CNP.      DESMOND DAVID

## 2021-02-21 NOTE — PROGRESS NOTES
Assessment completed and documented. VSS. A/ox4. C/o 5/10 abd pain, PCA pump syringe replaced at shift change. NPO with ice. Cristobal cath in place. Refusing SCDs at this time. Potassium replaced. midline incision with old drainage noted. Right RUPAL drain stripped and measured, see flowsheet. Denies passing gas. Bed locked and in lowest position. Bedside table and call light within reach. Denies further needs at this time.

## 2021-02-21 NOTE — PROGRESS NOTES
Occupational Therapy   Occupational Therapy Initial Assessment and Treatment Note  Date: 2021   Patient Name: Jeferson Haynes  MRN: [id number]     : 1950    Date of Service: 2021    Discharge Recommendations:  [address]  OT Equipment Recommendations  Equipment Needed: No  Other: defer to next level of care    Assessment   Assessment: OT eval complete. Pt presents with the above deficits impacting independence with ADLs, t/fs and mobility. Pt extensively educated regarding role of OT and importance of attempting OOB, pt resistant to all education, argumentative and insistent on controlling session despite OT/PT recommendations for improved safety with ADLs, t/fs and mobility. Pt frequently telling PT/OT to slow down and not pull on her even with PT/OT had no hands on pt. Pt requires maxA x 2 to get to EOB and modA to attempt to stand, pt refuses use of SW, wants to complete 180 degree pivot to [facility] but is unable to stand to pivot. Recommend continued skilled OT to ensure safe return to OF. Prognosis: Fair  Decision Making: Medium Complexity  OT Education: OT Role;Plan of Care;Home Exercise Program;ADL Adaptive Strategies;Precautions;Transfer Training;Energy Conservation;Equipment  Patient Education: benefits of OOB and participation in therapy  Barriers to Learning: increased behaviors, resistance to therapy and education  REQUIRES OT FOLLOW UP: Yes  Activity Tolerance  Activity Tolerance: Patient limited by pain;Treatment limited secondary to agitation  Activity Tolerance: resistance to therapy, max increased time required for all movements  Safety Devices  Safety Devices in place: Yes  Type of devices: All fall risk precautions in place;Call light within reach; Bed alarm in place;Gait belt;Patient at risk for falls;Nurse notified; Left in bed  Restraints  Initially in place: No Patient Diagnosis(es): The primary encounter diagnosis was Intra-abdominal abscess ([address] Diagnoses of Acute diverticulitis, Generalized weakness, and Noncompliance with medication regimen were also pertinent to this visit. has a past medical history of Acute hemorrhoid, Depression, Elevated blood pressure reading in office with white coat syndrome, without diagnosis of hypertension, and Osteoarthritis. has a past surgical history that includes Tubal ligation; Breast enhancement surgery (1968); CT PERITONEAL/RETROPERITONEAL PERC DRAIN (2/2/2021); and Small intestine surgery (N/A, 2/18/2021). Restrictions  Restrictions/Precautions  Restrictions/Precautions: Fall Risk, Up as Tolerated  Required Braces or Orthoses?: No  Position Activity Restriction  Other position/activity restrictions:  Cristobal, RUPAL, IV, PCA pump, abdominal binder    Subjective   General  Chart Reviewed: Yes  Patient assessed for rehabilitation services?: Yes  Family / Caregiver Present: No  Diagnosis: abscess of sigmoid colon  Subjective  Subjective: Pt supine in bed upon arrival, pt hesitant and tearful upon therapy introduction, pt agreeable to therapy upon max encouragement  General Comment  Comments: RN approval prior to session  Patient Currently in Pain: Yes  Pain Assessment  Pain Assessment: 0-10  Pain Level: 6  Pain Type: Acute pain;Surgical pain  Pain Location: Abdomen  Pain Orientation: Mid  Pain Descriptors: Sharp  Non-Pharmaceutical Pain Intervention(s): Ambulation/Increased Activity;Repositioned;Relaxation techniques  Response to Pain Intervention: Patient Satisfied  Vital Signs  Level of Consciousness: Alert (0)  Patient Currently in Pain: Yes  Social/Functional History  Social/Functional History  Lives With: Alone  Type of Home: Apartment  Home Layout: One level  Home Access: Level entry  Bathroom Shower/Tub: Tub/Shower unit  Bathroom Toilet: Handicap height(Pt reports she stands to urinate and have BM) Bathroom Equipment: Grab bars in shower, Grab bars around toilet  Home Equipment: [employer], Standard walker  ADL Assistance: Independent  Homemaking Assistance: Independent  Homemaking Responsibilities: Yes  Meal Prep Responsibility: Primary  Laundry Responsibility: Primary  Cleaning Responsibility: Primary  Shopping Responsibility: Primary  Ambulation Assistance: Independent(With SPC)  Transfer Assistance: Independent  Active : Yes  Additional Comments: Pt denies hx of falls in past year, has hx of previous falls       Objective        Orientation  Overall Orientation Status: Within Functional Limits  Observation/Palpation  Posture: Fair  Balance  Sitting Balance: Stand by assistance  Standing Balance: Moderate assistance(squat stand EOB)  ADL  Feeding: Setup; Beverage management  LE Dressing: Dependent/Total(socks)  Toileting: Dependent/Total(bowling)  Tone RUE  RUE Tone: Normotonic  Tone LUE  LUE Tone: Normotonic  Coordination  Movements Are Fluid And Coordinated: Yes     Bed mobility  Rolling to Right: Maximum assistance;2 Person assistance  Supine to Sit: Maximum assistance;2 Person assistance  Comment: max increased time        Cognition  Overall Cognitive Status: Exceptions  Arousal/Alertness: Delayed responses to stimuli  Following Commands:  Follows one step commands with increased time  Attention Span: Attends with cues to redirect  Memory: Appears intact  Safety Judgement: Decreased awareness of need for assistance  Problem Solving: Assistance required to generate solutions  Insights: Decreased awareness of deficits  Initiation: Requires cues for some  Sequencing: Requires cues for some  Cognition Comment: pt resistant to therapy recommendations and safety education        Sensation  Overall Sensation Status: WFL        LUE AROM (degrees)  LUE AROM : WFL  RUE AROM (degrees)  RUE AROM : WFL  LUE Strength  Gross LUE Strength: Exceptions to WFL(functionally weak)  RUE Strength

## 2021-02-21 NOTE — PROGRESS NOTES
Assumed care of patient from [address]. Patient a/ox4. VSS. RM air. Pain controlled. Patient resting at this time, denies needs. Will monitor.

## 2021-02-22 LAB
ANION GAP SERPL CALCULATED.3IONS-SCNC: 9 MMOL/L (ref 3–16)
BUN BLDV-MCNC: <2 MG/DL (ref 7–20)
CALCIUM SERPL-MCNC: 8 MG/DL (ref 8.3–10.6)
CHLORIDE BLD-SCNC: 106 MMOL/L (ref 99–110)
CO2: 27 MMOL/L (ref 21–32)
CREAT SERPL-MCNC: <0.5 MG/DL (ref 0.6–1.2)
GFR AFRICAN AMERICAN: >60
GFR NON-AFRICAN AMERICAN: >60
GLUCOSE BLD-MCNC: 102 MG/DL (ref 70–99)
GLUCOSE BLD-MCNC: 76 MG/DL (ref 70–99)
GLUCOSE BLD-MCNC: 83 MG/DL (ref 70–99)
GLUCOSE BLD-MCNC: 84 MG/DL (ref 70–99)
GLUCOSE BLD-MCNC: 86 MG/DL (ref 70–99)
GLUCOSE BLD-MCNC: 86 MG/DL (ref 70–99)
GLUCOSE BLD-MCNC: 99 MG/DL (ref 70–99)
HCT VFR BLD CALC: 30 % (ref 36–48)
HEMOGLOBIN: 10.1 G/DL (ref 12–16)
MCH RBC QN AUTO: 29.8 PG (ref 26–34)
MCHC RBC AUTO-ENTMCNC: 33.9 G/DL (ref 31–36)
MCV RBC AUTO: 88.1 FL (ref 80–100)
PDW BLD-RTO: 15.8 % (ref 12.4–15.4)
PERFORMED ON: ABNORMAL
PERFORMED ON: NORMAL
PLATELET # BLD: 191 K/UL (ref 135–450)
PMV BLD AUTO: 7.4 FL (ref 5–10.5)
POTASSIUM SERPL-SCNC: 3 MMOL/L (ref 3.5–5.1)
RBC # BLD: 3.4 M/UL (ref 4–5.2)
SODIUM BLD-SCNC: 142 MMOL/L (ref 136–145)
WBC # BLD: 5.9 K/UL (ref 4–11)

## 2021-02-22 PROCEDURE — 6360000002 HC RX W HCPCS: Performed by: CLINICAL NURSE SPECIALIST

## 2021-02-22 PROCEDURE — 6360000002 HC RX W HCPCS: Performed by: INTERNAL MEDICINE

## 2021-02-22 PROCEDURE — 2500000003 HC RX 250 WO HCPCS: Performed by: CLINICAL NURSE SPECIALIST

## 2021-02-22 PROCEDURE — 2580000003 HC RX 258: Performed by: SURGERY

## 2021-02-22 PROCEDURE — APPSS45 APP SPLIT SHARED TIME 31-45 MINUTES: Performed by: CLINICAL NURSE SPECIALIST

## 2021-02-22 PROCEDURE — 97530 THERAPEUTIC ACTIVITIES: CPT

## 2021-02-22 PROCEDURE — 6360000002 HC RX W HCPCS: Performed by: SURGERY

## 2021-02-22 PROCEDURE — 1200000000 HC SEMI PRIVATE

## 2021-02-22 PROCEDURE — 36415 COLL VENOUS BLD VENIPUNCTURE: CPT

## 2021-02-22 PROCEDURE — 85027 COMPLETE CBC AUTOMATED: CPT

## 2021-02-22 PROCEDURE — 80048 BASIC METABOLIC PNL TOTAL CA: CPT

## 2021-02-22 PROCEDURE — 99024 POSTOP FOLLOW-UP VISIT: CPT | Performed by: SURGERY

## 2021-02-22 PROCEDURE — 6370000000 HC RX 637 (ALT 250 FOR IP): Performed by: NURSE PRACTITIONER

## 2021-02-22 PROCEDURE — 2580000003 HC RX 258: Performed by: INTERNAL MEDICINE

## 2021-02-22 RX ORDER — POTASSIUM CHLORIDE 7.45 MG/ML
10 INJECTION INTRAVENOUS
Status: COMPLETED | OUTPATIENT
Start: 2021-02-22 | End: 2021-02-22

## 2021-02-22 RX ADMIN — HYDROXYZINE HYDROCHLORIDE 25 MG: 10 TABLET, FILM COATED ORAL at 21:18

## 2021-02-22 RX ADMIN — PIPERACILLIN AND TAZOBACTAM 3375 MG: 3; .375 INJECTION, POWDER, FOR SOLUTION INTRAVENOUS at 18:25

## 2021-02-22 RX ADMIN — SODIUM CHLORIDE: 9 INJECTION, SOLUTION INTRAVENOUS at 00:05

## 2021-02-22 RX ADMIN — ONDANSETRON 4 MG: 2 INJECTION INTRAMUSCULAR; INTRAVENOUS at 05:46

## 2021-02-22 RX ADMIN — SODIUM CHLORIDE, PRESERVATIVE FREE 10 ML: 5 INJECTION INTRAVENOUS at 21:19

## 2021-02-22 RX ADMIN — PIPERACILLIN AND TAZOBACTAM 3375 MG: 3; .375 INJECTION, POWDER, FOR SOLUTION INTRAVENOUS at 09:48

## 2021-02-22 RX ADMIN — POTASSIUM CHLORIDE 10 MEQ: 7.46 INJECTION, SOLUTION INTRAVENOUS at 12:21

## 2021-02-22 RX ADMIN — SODIUM CHLORIDE: 9 INJECTION, SOLUTION INTRAVENOUS at 07:03

## 2021-02-22 RX ADMIN — SODIUM CHLORIDE, PRESERVATIVE FREE 10 ML: 5 INJECTION INTRAVENOUS at 09:47

## 2021-02-22 RX ADMIN — PIPERACILLIN AND TAZOBACTAM 3375 MG: 3; .375 INJECTION, POWDER, FOR SOLUTION INTRAVENOUS at 01:30

## 2021-02-22 RX ADMIN — KETOROLAC TROMETHAMINE 15 MG: 30 INJECTION, SOLUTION INTRAMUSCULAR at 05:43

## 2021-02-22 RX ADMIN — POTASSIUM CHLORIDE 10 MEQ: 7.46 INJECTION, SOLUTION INTRAVENOUS at 15:46

## 2021-02-22 RX ADMIN — FAMOTIDINE 20 MG: 10 INJECTION, SOLUTION INTRAVENOUS at 21:19

## 2021-02-22 RX ADMIN — POTASSIUM CHLORIDE 10 MEQ: 7.46 INJECTION, SOLUTION INTRAVENOUS at 12:17

## 2021-02-22 RX ADMIN — Medication 10 ML: at 13:24

## 2021-02-22 RX ADMIN — FAMOTIDINE 20 MG: 10 INJECTION, SOLUTION INTRAVENOUS at 09:48

## 2021-02-22 RX ADMIN — SODIUM CHLORIDE: 9 INJECTION, SOLUTION INTRAVENOUS at 09:57

## 2021-02-22 RX ADMIN — HYDROMORPHONE HYDROCHLORIDE 0.2 MG: 10 INJECTION, SOLUTION INTRAMUSCULAR; INTRAVENOUS; SUBCUTANEOUS at 06:53

## 2021-02-22 RX ADMIN — POTASSIUM CHLORIDE 10 MEQ: 7.46 INJECTION, SOLUTION INTRAVENOUS at 21:24

## 2021-02-22 RX ADMIN — SODIUM CHLORIDE: 9 INJECTION, SOLUTION INTRAVENOUS at 19:39

## 2021-02-22 RX ADMIN — ONDANSETRON 4 MG: 2 INJECTION INTRAMUSCULAR; INTRAVENOUS at 13:23

## 2021-02-22 RX ADMIN — KETOROLAC TROMETHAMINE 15 MG: 30 INJECTION, SOLUTION INTRAMUSCULAR at 00:04

## 2021-02-22 ASSESSMENT — PAIN DESCRIPTION - DESCRIPTORS: DESCRIPTORS: BURNING

## 2021-02-22 ASSESSMENT — PAIN DESCRIPTION - FREQUENCY
FREQUENCY: INTERMITTENT
FREQUENCY: CONTINUOUS

## 2021-02-22 ASSESSMENT — PAIN DESCRIPTION - ORIENTATION: ORIENTATION: LOWER

## 2021-02-22 ASSESSMENT — PAIN SCALES - GENERAL
PAINLEVEL_OUTOF10: 8
PAINLEVEL_OUTOF10: 7
PAINLEVEL_OUTOF10: 7

## 2021-02-22 ASSESSMENT — PAIN DESCRIPTION - ONSET
ONSET: ON-GOING
ONSET: ON-GOING

## 2021-02-22 ASSESSMENT — PAIN DESCRIPTION - PAIN TYPE
TYPE: ACUTE PAIN
TYPE: ACUTE PAIN

## 2021-02-22 ASSESSMENT — PAIN DESCRIPTION - LOCATION
LOCATION: ABDOMEN

## 2021-02-22 NOTE — PROGRESS NOTES
Assessment completed and documented. VSS. A/ox4. C/o 7/10 resting in bed, PCA pump- demand. Midline incision, dry drainage. RUPAL drain stripped and emptied, dressing with dry drainage. Ice applied to abd for comfort. Anxious often, offered anxiety/ itching medication. This RN gave patient instruction on \"grounding\" herself. Instructions written down for patient. Cristobal cath in place, patent. Encouraging patient to turn while in bed, resistant for help on this. Educated the need to turn while in bed, patient states she understands. Bed locked and in lowest position. Bedside table and call light within reach. Denies further needs at this time. Patient stated that \"when shes upset, when just needs to cry and then feels better\"    Patient agreed to be up at 0530 tomorrow morning and get up out of bed for exercise, stating \"I know it will be really hard, but I need to do it. \"

## 2021-02-22 NOTE — PROGRESS NOTES
Whitman-Baker Pain Rating: Hurts whole lot  Pain Type: Acute pain;Surgical pain  Pain Location: Abdomen  Pain Orientation: Mid;Lower  Pain Descriptors: Sore  Pain Frequency: Continuous  Non-Pharmaceutical Pain Intervention(s): Ambulation/Increased Activity; Emotional support;Repositioned;Distraction       Orientation  Orientation  Overall Orientation Status: Within Normal Limits     Objective   Bed mobility  Supine to Sit: Stand by assistance  Sit to Supine: Contact guard assistance(for LE's)  Scootin Person assistance(modA x 2 to scoot up in bed)  Comment: Increased time needed to complete bed mobility tasks (pt took ~5 minutes to transfer supine>sit, refusing physical help from therapists when offered). Transfers  Sit to Stand: 2 Person Assistance(modA x 2 from EOB to RW, max encouragement and coaxing over period of ~20 minutes needed to attempt to stand)  Stand to sit: 2 Person Assistance(modA x 2 from RW to EOB)  Bed to Chair: Unable to assess(pt refused to attempt despite encouragement)     Ambulation  Ambulation?: No(pt refused to attempt despite encouragement)     Balance  Posture: Fair  Sitting - Static: Fair  Sitting - Dynamic: Poor;+  Standing - Static: Poor;+  Comments: Pt able to sit EOB x 30 minutes with SBA/supervision. ModA x 2 needed to maintain static standing at RW (stood for ~30 seconds with time self-limiting by pain, fatigue, and anxiety). Exercises  Comments: Pt refused to attempt despite encouragement.      AM-PAC Score  AM-PAC Inpatient Mobility Raw Score : 9 (21)  AM-PAC Inpatient T-Scale Score : 30.55 (21)  Mobility Inpatient CMS 0-100% Score: 81.38 (21)  Mobility Inpatient CMS G-Code Modifier : CM (21)    Goals  Short term goals  Time Frame for Short term goals: 1 week 21 (unless otherwise specified)

## 2021-02-22 NOTE — CARE COORDINATION
Chart reviewed day 7. POD 4. Clear liquid diet. PCA to d/c tomorrow, encouraging out of bed. Spoke with patient. Discussed therapy recs for SNF. Agreed she is too weak to go home. Would like me to discuss with her Daughter April. Call placed unable to leave message. Patient provided CM contact info for patient to give to daughter.  Tracy Horowitz RN

## 2021-02-22 NOTE — PROGRESS NOTES
[facility] GENERAL SURGERY    Surgery Progress Note           POD # 4    PATIENT NAME: Yumiko Apple     TODAY'S DATE: 2/22/2021    INTERVAL HISTORY:    Pt reports she is passing flatus. Remains reluctant to move and resistant to therapy and nursing suggestions. OBJECTIVE:   VITALS:  /70   Pulse 88   Temp 98.4 °F (36.9 °C) (Axillary)   Resp 14   Ht 5' 5\" (1.651 m)   Wt 132 lb 0.9 oz (59.9 kg)   SpO2 97%   BMI 21.98 kg/m²     INTAKE/OUTPUT:    I/O last 3 completed shifts: In: 2715.4 [P.O.:60; I.V.:2655.4]  Out: 1038 [Urine:4225; Drains:108]  I/O this shift:  In: 640 [I.V.:640]  Out: -               CONSTITUTIONAL:  awake and alert  LUNGS:  no crackles or wheezing  ABDOMEN:    soft, non-distended, tenderness noted appropriate, keegan in place serosanguinous   INCISION: scant amt drainage from prior drain site, staples intact no erythema    Data:  CBC:   Recent Labs     02/22/21  0524   WBC 5.9   HGB 10.1*   HCT 30.0*        BMP:    Recent Labs     02/20/21  0616 02/20/21  2054 02/21/21  0440 02/22/21  0524     --   --  142   K 2.9* 3.1* 3.3* 3.0*     --   --  106   CO2 28  --   --  27   BUN 4*  --   --  <2*   CREATININE <0.5*  --   --  <0.5*   GLUCOSE 91  --   --  86     Hepatic:   No results for input(s): AST, ALT, ALB, BILITOT, ALKPHOS in the last 72 hours. ASSESSMENT AND PLAN:  72 y.o. female status post EXPLORATORY LAPAROTOMY, SIGMOIDECTOMY WITH ANASTOMOSIS    GI: clear liquid diet   : continue with bowling to monitor I/O - will anticipate removing tomorrow  Pulm: encourage IS, increase time up in chair  ID: continue with IV antibiotics. Pain: controlled - will continue with PCA today, stop tomorrow   Activity: OOB to chair,  - PT/OT   Hypokalemia: replace today       Electronically signed by EMA Little - CNP     Patient seen and agree with above.     Amaury Silva MD

## 2021-02-22 NOTE — PROGRESS NOTES
02/22/21 1544   Oxygen Therapy/Pulse Ox   O2 Therapy Room air   Resp 17   SpO2 97 %   End Tidal CO2 36

## 2021-02-22 NOTE — PLAN OF CARE
Problem: Falls - Risk of:  Goal: Will remain free from falls  Description: Will remain free from falls  Outcome: Ongoing  Goal: Absence of physical injury  Description: Absence of physical injury  Outcome: Ongoing     Problem: Falls - Risk of: Intervention: Assess risk factors for falls  Note: Pt will remain free from falls this shift  Intervention: Postfall assessment  Note: No falls sustained thus far this shift  Intervention: Manage a safe environment  Note: Call light within reach. Bed side table within reach. Wheels locked. Bed in lowest position. Bed check in place. Pt instructed to call out for assistance. Pt expressed understanding & calls out appropriately. Intervention: Toileting assistance  Note: Cristobal cath in place.

## 2021-02-22 NOTE — PROGRESS NOTES
Comprehensive Nutrition Assessment    Type and Reason for Visit:  Initial(LOS)    Nutrition Recommendations/Plan:   1. Advance as tolerated per general surgery  2. Add nutrition supplements as diet progresses to prevent further nutritional deficits  3. If unable to advance oral diet in the next 24 hours recommend Clinimix 5/20 E start at 42 ml/hr first bag, increase to goal rate of 70 ml/hr providing 84 grams of protein, 1492 calories, GIR 3.9 mg/kg/min, + bi-weekly lipids providing an additional 143 average daily calories from lipids  4. Will monitor nutritional adequacy, nutrition-related labs, weights, BMs, and clinical progress     Nutrition Assessment:  Patient admitted with abscess of sigmoid colon status post exploratory laparotomy with sigmoidectomy andstapled colorectal anastomosis on 2/18/21. Currently NPO. Normal saline at 100 ml/hr. Malnutrition Assessment:  Malnutrition Status:   At risk for malnutrition (Comment)      Estimated Daily Nutrient Needs:  Energy (kcal):  2924-7919; Weight Used for Energy Requirements:  Current(59.9 kg)     Protein (g):  78-90; Weight Used for Protein Requirements:  Current(1.3-1.5)        Fluid (ml/day):   ; Method Used for Fluid Requirements:  1 ml/kcal      Nutrition Related Findings:  BM x 2 on 2/18; K 3.0, Ca 8.0 on 2/22; blood sugars within normal limits; weight has trended up this admission likely fluid since NPO for 7 days      Wounds:  (redness and abd incision)       Current Nutrition Therapies:    Diet NPO Effective Now Exceptions are: Ice Chips, Sips with Meds, Popsicles  · Goal PN Orders Provides: If unable to advance oral diet in the next 24 hours recommend Clinimix 5/20 E start at 42 ml/hr first bag, increase to goal rate of 70 ml/hr providing 84 grams of protein, 1492 calories, GIR 3.9 mg/kg/min, + bi-weekly lipids providing an additional 143 average daily calories from lipids    Anthropometric Measures:  · Height: 5' 5\" (165.1 cm) · Current Body Weight: 132 lb (59.9 kg)   · Admission Body Weight: 126 lb 14 oz (57.6 kg)     · Ideal Body Weight: 125 lbs; % Ideal Body Weight     · BMI: 22   · BMI Categories: Underweight (BMI less than 22) age over 72       Nutrition Diagnosis:   · Inadequate energy intake related to increase demand for energy/nutrients, altered GI function as evidenced by NPO or clear liquid status due to medical condition      Nutrition Interventions:   Food and/or Nutrient Delivery:  Start Oral Diet, Start Oral Nutrition Supplement, Start Parenteral Nutrititon(if unable to advance diet with adequate intake in the next 1-2 days)  Nutrition Education/Counseling:  No recommendation at this time   Coordination of Nutrition Care:  Continue to monitor while inpatient    Goals:  Patient will tolerate oral diet progression without worsening GI distress or signs of aspiration. Nutrition Monitoring and Evaluation:   Behavioral-Environmental Outcomes:      Food/Nutrient Intake Outcomes:  Food and Nutrient Intake, Supplement Intake  Physical Signs/Symptoms Outcomes:  Diarrhea     Discharge Planning:     Too soon to determine     Electronically signed by Katherine Sheppard, [address],  on 2/22/21 at 10:19 AM EST    Contact: Office: [phone]; [address]: [phone]

## 2021-02-22 NOTE — PROGRESS NOTES
Pt A&O x4. VSS. Denies dyspnea. Utilizing PCA pump for surgical pain. BS hypoactive x4; passing flatus. Small amt old drainage under midline mepilex. RUPAL to R abd with serosanguinous drainage; bulb compressed. Cristobal cath draining clear, yellow urine. Call light within reach. Bed side table within reach. Wheels locked. Bed in lowest position. Bed check in place. Pt instructed to call out for assistance. Pt expressed understanding & calls out appropriately. Shift assessment complete. All care cont per orders.   Electronically signed by Ricardo Saxena RN on 2/22/2021 at 12:09 PM

## 2021-02-22 NOTE — PLAN OF CARE
Patient states she is in 8/10 pain, ice applied to abd, abd binder in place for comfort and PCA pump with patient education applied.  Patient states the PCA pump helps her pain by \"2 points\"

## 2021-02-22 NOTE — PROGRESS NOTES
x1 assist to the [facility] with standard walker. Patient tearful when getting up with anxiety and was angry with using the standard walker. However stated that   \" I could see how it could be helpful later\"    Attempted to explain procedure on how to get up with walker and onto [facility]. Patient did not want to hear any instructions and instead cried before standing up because \"it's too many steps\" this RN explained the need for safety for not only her but staff as well. Patient got on [facility] and sat for 10 minutes. Before trying to get up, patient cries again because \"it's too many steps, I want to get from point A to point B instead of point A to point C\"   Attempted to direct patient several times, but patient kept saying [facility]! \" when attempting to motivate patient and tell her she is \"doing awesome\" she said \"I HATE THAT! DON'T TELL ME THAT! \"     After sitting for 10 minutes, patient attempted to get up and quit and refused to try it with the walker. Patient screamed for help because she didn't want to use the standard walker. Pushed the standard walker out of the way and used one side of it to push up with. Patient very weak, barely made it to the bed from the [facility] that was right next to her bed. FRANCOIS Small and this RN helped patient back into bed. Also stated \"if they want me to get up later they have another thing coming\"  This RN explained to patient that \"if she kept refusing to move that's her right, but she might end up needing extra help at home or a SNF. It's too much work for her dtr to do for her at home. \"    This RN spent 1 hr in room with patient helping patient ambulate to [facility] and back into her bed.

## 2021-02-22 NOTE — PROGRESS NOTES
Occupational Therapy  Facility/Department: [facility] C3 TELE/MED SURG/ONC  Daily Treatment Note  NAME: Racheal May  : 1950  MRN: [id number]    Date of Service: 2021    Discharge Recommendations:  Subacute/Skilled Nursing Facility       Assessment   Performance deficits / Impairments: Decreased functional mobility ; Decreased safe awareness;Decreased balance;Decreased ADL status; Decreased cognition;Decreased endurance;Decreased strength;Decreased high-level IADLs  Assessment: Patient continues to resist education from therapist on importance of OOB, taking patient 20 mins sitting EOB before she would attempt to stand with therapist. Wilder Ambrociohs of 2 from both OT/PT to come to stand with posterior lean and cures required for safe hand placement. Co-tx collaboration this date to safely meet goals and will have better occupational performance outcomes with in a co-treament than 1:1 session. OT Education: OT Role;Plan of Care;Home Exercise Program;ADL Adaptive Strategies;Precautions;Transfer Training;Energy Conservation;Equipment  Patient Education: benefits of OOB and participation in therapy  Pt educated on benefits of OOB activity to decrease risks associated with prolonged bedrest while in hospital. Pt verbalized understanding, though states she feels rushed with everything. Barriers to Learning: resistance to therapy and education, self limiting  REQUIRES OT FOLLOW UP: Yes  Safety Devices  Safety Devices in place: Yes  Type of devices: All fall risk precautions in place;Call light within reach; Bed alarm in place;Gait belt;Patient at risk for falls;Nurse notified; Left in bed         Patient Diagnosis(es): The primary encounter diagnosis was Intra-abdominal abscess ([facility] [address] Diagnoses of Acute diverticulitis, Generalized weakness, and Noncompliance with medication regimen were also pertinent to this visit. has a past medical history of Acute hemorrhoid, Depression, Elevated blood pressure reading in office with white coat syndrome, without diagnosis of hypertension, and Osteoarthritis. has a past surgical history that includes Tubal ligation; Breast enhancement surgery (1968); CT PERITONEAL/RETROPERITONEAL PERC DRAIN (2/2/2021); and Small intestine surgery (N/A, 2/18/2021). Restrictions  Restrictions/Precautions  Restrictions/Precautions: Fall Risk, Up as Tolerated, General Precautions  Required Braces or Orthoses?: No  Position Activity Restriction  Other position/activity restrictions: Bowling, RUPAL, multiple IV lines, PCA pump, abdominal binder  Subjective   General  Chart Reviewed: Yes  Patient assessed for rehabilitation services?: Yes  Family / Caregiver Present: No  Diagnosis: abscess of sigmoid colon  Subjective  Subjective: Pt in bed upon entry, pleasant and wanting to get to [facility] upon entry. Pt becoming tearful when asked to ambulate to bathroom vs using BSC this session. General Comment  Comments: RN approval prior to session   Vitals: Pt refused vitals d/t pain and anxiety. Pain Screening  Patient Currently in Pain: Yes  Pain Assessment  Pain Assessment: Faces  Whitman-Baker Pain Rating: Hurts whole lot  Pain Type: Acute pain;Surgical pain  Pain Location: Abdomen  Pain Orientation: Mid;Lower  Pain Descriptors: Sore  Pain Frequency: Continuous  Non-Pharmaceutical Pain Intervention(s): Ambulation/Increased Activity; Emotional support;Repositioned;Distraction      Orientation  Orientation  Overall Orientation Status: Within Functional Limits  Objective    ADL  Feeding: Setup; Beverage management(enoucouraged patient to reach and hold cup herself)  LE Dressing: Dependent/Total  Toileting: Dependent/Total(bowling)        Balance  Sitting Balance: Supervision  Standing Balance: Dependent/Total(modA of 2 with RW for UE support)  Bed mobility Short term goal 1: Pt will complete toilet t/f with min A by 2/25.-ongoing  Short term goal 2: Pt will complete toileting with min A..-ongoing  Short term goal 3: Pt will complete LB dressing using AE with min A..-ongoing  Short term goal 4: Pt will tolerate standing x1 min to increase participation in ADLs. .-ongoing  Patient Goals   Patient goals : decrease pain       Therapy Time   Individual Concurrent Group Co-treatment   Time In       1640   Time Out       1718   Minutes       38   Timed Code Treatment Minutes: 38 Minutes       DHAVAL Be/L  If pt is unable to be seen after this session, please let this note serve as discharge summary. Please see case management note for discharge disposition. Thank you.

## 2021-02-23 LAB
ANION GAP SERPL CALCULATED.3IONS-SCNC: 6 MMOL/L (ref 3–16)
BUN BLDV-MCNC: <2 MG/DL (ref 7–20)
CALCIUM SERPL-MCNC: 8.1 MG/DL (ref 8.3–10.6)
CHLORIDE BLD-SCNC: 105 MMOL/L (ref 99–110)
CO2: 29 MMOL/L (ref 21–32)
CREAT SERPL-MCNC: <0.5 MG/DL (ref 0.6–1.2)
GFR AFRICAN AMERICAN: >60
GFR NON-AFRICAN AMERICAN: >60
GLUCOSE BLD-MCNC: 101 MG/DL (ref 70–99)
GLUCOSE BLD-MCNC: 105 MG/DL (ref 70–99)
GLUCOSE BLD-MCNC: 120 MG/DL (ref 70–99)
GLUCOSE BLD-MCNC: 87 MG/DL (ref 70–99)
GLUCOSE BLD-MCNC: 90 MG/DL (ref 70–99)
GLUCOSE BLD-MCNC: 93 MG/DL (ref 70–99)
PERFORMED ON: ABNORMAL
PERFORMED ON: NORMAL
PERFORMED ON: NORMAL
POTASSIUM SERPL-SCNC: 3 MMOL/L (ref 3.5–5.1)
POTASSIUM SERPL-SCNC: 3.2 MMOL/L (ref 3.5–5.1)
POTASSIUM SERPL-SCNC: 3.3 MMOL/L (ref 3.5–5.1)
SODIUM BLD-SCNC: 140 MMOL/L (ref 136–145)

## 2021-02-23 PROCEDURE — 99024 POSTOP FOLLOW-UP VISIT: CPT | Performed by: SURGERY

## 2021-02-23 PROCEDURE — 2580000003 HC RX 258: Performed by: INTERNAL MEDICINE

## 2021-02-23 PROCEDURE — 6360000002 HC RX W HCPCS: Performed by: INTERNAL MEDICINE

## 2021-02-23 PROCEDURE — 2580000003 HC RX 258: Performed by: SURGERY

## 2021-02-23 PROCEDURE — 36415 COLL VENOUS BLD VENIPUNCTURE: CPT

## 2021-02-23 PROCEDURE — 6370000000 HC RX 637 (ALT 250 FOR IP): Performed by: NURSE PRACTITIONER

## 2021-02-23 PROCEDURE — APPSS60 APP SPLIT SHARED TIME 46-60 MINUTES: Performed by: CLINICAL NURSE SPECIALIST

## 2021-02-23 PROCEDURE — 2500000003 HC RX 250 WO HCPCS: Performed by: CLINICAL NURSE SPECIALIST

## 2021-02-23 PROCEDURE — 1200000000 HC SEMI PRIVATE

## 2021-02-23 PROCEDURE — 6360000002 HC RX W HCPCS: Performed by: NURSE PRACTITIONER

## 2021-02-23 PROCEDURE — 6360000002 HC RX W HCPCS: Performed by: SURGERY

## 2021-02-23 PROCEDURE — 84132 ASSAY OF SERUM POTASSIUM: CPT

## 2021-02-23 PROCEDURE — 80048 BASIC METABOLIC PNL TOTAL CA: CPT

## 2021-02-23 RX ORDER — POTASSIUM CHLORIDE 7.45 MG/ML
10 INJECTION INTRAVENOUS
Status: DISPENSED | OUTPATIENT
Start: 2021-02-23 | End: 2021-02-23

## 2021-02-23 RX ADMIN — POTASSIUM CHLORIDE 10 MEQ: 7.46 INJECTION, SOLUTION INTRAVENOUS at 03:57

## 2021-02-23 RX ADMIN — SODIUM CHLORIDE, PRESERVATIVE FREE 10 ML: 5 INJECTION INTRAVENOUS at 20:04

## 2021-02-23 RX ADMIN — SODIUM CHLORIDE: 9 INJECTION, SOLUTION INTRAVENOUS at 05:37

## 2021-02-23 RX ADMIN — PIPERACILLIN AND TAZOBACTAM 3375 MG: 3; .375 INJECTION, POWDER, FOR SOLUTION INTRAVENOUS at 09:43

## 2021-02-23 RX ADMIN — HYDROMORPHONE HYDROCHLORIDE: 10 INJECTION, SOLUTION INTRAMUSCULAR; INTRAVENOUS; SUBCUTANEOUS at 14:58

## 2021-02-23 RX ADMIN — POTASSIUM CHLORIDE 10 MEQ: 7.46 INJECTION, SOLUTION INTRAVENOUS at 02:09

## 2021-02-23 RX ADMIN — POTASSIUM CHLORIDE 10 MEQ: 7.46 INJECTION, SOLUTION INTRAVENOUS at 06:25

## 2021-02-23 RX ADMIN — HYDROXYZINE HYDROCHLORIDE 25 MG: 10 TABLET, FILM COATED ORAL at 09:55

## 2021-02-23 RX ADMIN — PIPERACILLIN AND TAZOBACTAM 3375 MG: 3; .375 INJECTION, POWDER, FOR SOLUTION INTRAVENOUS at 02:10

## 2021-02-23 RX ADMIN — FAMOTIDINE 20 MG: 10 INJECTION, SOLUTION INTRAVENOUS at 09:42

## 2021-02-23 RX ADMIN — PIPERACILLIN AND TAZOBACTAM 3375 MG: 3; .375 INJECTION, POWDER, FOR SOLUTION INTRAVENOUS at 18:12

## 2021-02-23 RX ADMIN — FAMOTIDINE 20 MG: 10 INJECTION, SOLUTION INTRAVENOUS at 20:04

## 2021-02-23 ASSESSMENT — PAIN SCALES - GENERAL: PAINLEVEL_OUTOF10: 4

## 2021-02-23 NOTE — PLAN OF CARE
Problem: Falls - Risk of:  Goal: Will remain free from falls  Description: Will remain free from falls  2/23/2021 1319 by Kavitha De La Rosa RN  Outcome: Ongoing  Note: Pt will remain free from falls throughout hospital stay. Fall precautions in place, bed alarm on, bed in lowest position with wheels locked and side rails 2/4 up. Room door open and hourly rounding completed. Will continue to monitor throughout shift. Problem: Pain:  Goal: Control of acute pain  Description: Control of acute pain  Outcome: Ongoing  Note: Pt will be satisfied with pain control. Pt uses numeric pain rating scale while using PCA pump. Will continue to monitor progression throughout shift.

## 2021-02-23 NOTE — PROGRESS NOTES
02/23/21 0800   Oxygen Therapy/Pulse Ox   O2 Therapy Room air   O2 Device None (Room air)   Resp 11   SpO2 96 %   End Tidal CO2 30

## 2021-02-23 NOTE — PROGRESS NOTES
02/23/21 1545   Oxygen Therapy/Pulse Ox   O2 Device None (Room air)   Resp 14   SpO2 92 %   End Tidal CO2 37

## 2021-02-23 NOTE — PROGRESS NOTES
02/22/21 1916   Oxygen Therapy/Pulse Ox   O2 Therapy Room air   Resp 12   SpO2 96 %   End Tidal CO2 30

## 2021-02-23 NOTE — PROGRESS NOTES
02/23/21 1223   Oxygen Therapy/Pulse Ox   O2 Therapy Room air   Resp 10   SpO2 95 %   End Tidal CO2 39

## 2021-02-23 NOTE — PROGRESS NOTES
Perfect serve message sent to Boyd Shah NP, \"FYI recheck of potassium level after 4 bags of replacement is 3.0\", awaiting response.

## 2021-02-23 NOTE — FLOWSHEET NOTE
02/23/21 0925   Encounter Summary   Services provided to: Patient   Referral/Consult From: Other    Continue Visiting   (2-23 followup, frustrated, prayer, follow)   Complexity of Encounter Moderate   Length of Encounter 45 minutes   Spiritual/Taoism   Type Spiritual support   Assessment Calm; Angry; Resentful;Concerns with suffering   Intervention Active listening;Explored feelings, thoughts, concerns;Explored coping resources;Nurtured hope;Prayer;Discussed illness/injury and it's impact   Outcome Engaged in conversation;Expressed feelings/needs/concerns;Receptive   Patient continues to verbalize frustration. She does not feel like she can reach the goals set for her by rehab. She states she continues to have up and down emotions. When asked about strength and coping, she stated, \"Robbin. \"  I provided a listening presence and prayer. She does state that she feels she is getting better from a surgical perspective. Continue prn  support.

## 2021-02-23 NOTE — PROGRESS NOTES
Cristobal cath removed, pt tolerated well. Pt educated on calling out when needing to void. Pt due to void 22:30.

## 2021-02-23 NOTE — PROGRESS NOTES
Pt assessment completed and charted. VSS. Pt a/o. Pt states that PCA pump adequately controls pain. Cristobal catheter in place and draining appropriately. RUPAL drain to bulb suction. Bowel sounds are hypoactive x4 quadrants. Bed in lowest position and wheels locked. Call light within reach. Bedside table within reach. Non-skid footwear in place. Pt denies any other needs at this time. Bed alarm in place for safety. Pt calls out appropriately. Will continue to monitor.

## 2021-02-23 NOTE — CARE COORDINATION
Chart reviewed. Spoke with daughter April. Discussed therapy recs for SNF. Would like referral to [facility]. Done. Waiting determination. Received repeat call from daughter April. Stated she would prefer [facility]. Will discuss with her mom to see if she is agreeable to further location. Cal Lamb RN      Spoke with patient agreeable to referral to [facility]. Done. Message left for intake Enma. Waiting return call with determination. Cal Lamb RN      No return call from [facility]. Left message again.  Cal Lamb RN

## 2021-02-23 NOTE — PROGRESS NOTES
[facility] GENERAL SURGERY    Surgery Progress Note           POD # 5    PATIENT NAME: Danielito Ferrell     TODAY'S DATE: 2/23/2021    INTERVAL HISTORY:    Pt reports she is still passing flatus, she remains reluctant to move and resistant to therapy and nursing suggestions. Tearful at times during discussion. OBJECTIVE:   VITALS:  BP (!) 148/77   Pulse 91   Temp 97.5 °F (36.4 °C) (Oral)   Resp 12   Ht 5' 5\" (1.651 m)   Wt 133 lb 9.6 oz (60.6 kg)   SpO2 93%   BMI 22.23 kg/m²     INTAKE/OUTPUT:    I/O last 3 completed shifts: In: 5851 [P.O.:480; I.V.:2657]  Out: 8254 [Urine:3225; Drains:65]  No intake/output data recorded. CONSTITUTIONAL:  awake and alert  LUNGS:  no crackles or wheezing  ABDOMEN:    soft, non-distended, tenderness noted appropriate, keegan in place serosanguinous   INCISION:  staples intact no erythema, scant amt drainage from old drain site    Data:  CBC:   Recent Labs     02/22/21  0524   WBC 5.9   HGB 10.1*   HCT 30.0*        BMP:    Recent Labs     02/22/21  0524 02/23/21  0013 02/23/21  0543     --  140   K 3.0* 3.0* 3.3*     --  105   CO2 27  --  29   BUN <2*  --  <2*   CREATININE <0.5*  --  <0.5*   GLUCOSE 86  --  90     Hepatic:   No results for input(s): AST, ALT, ALB, BILITOT, ALKPHOS in the last 72 hours. ASSESSMENT AND PLAN:  72 y.o. female status post EXPLORATORY LAPAROTOMY, SIGMOIDECTOMY WITH ANASTOMOSIS    GI: advance to full liquid diet  : remove bowling today  Pulm: encourage IS, increase time up in chair - again have discussed at length with pt  ID: continue with IV antibiotics. Pain: discussed with pt that we will plan on stopping PCA tomorrow. Activity: OOB to chair,  - PT/OT   Hypokalemia: replace today     Dispo: SNF recommended - looking at [facility], case management has discussed with pt's daughter.      Electronically signed by EMA Bush - CNP     Surgery Staff I have examined this patient and read and agree with the note by Mahad Condon CNP from today.       [employer] RAY

## 2021-02-23 NOTE — PROGRESS NOTES
Shift assessment completed and documented in chart. Pt currently finishing bag 4/4 of potassium. Orders for bowling cath to be removed, pt requesting this be done early this evening after lunch. Pt's VSS, no further needs at this time.

## 2021-02-23 NOTE — PLAN OF CARE
Pt remains free from falls during this shift. Pt a/o and calls out appropriately. Bed in lowest position and wheels locked. Call light within reach. Bedside table within reach.     Problem: Falls - Risk of:  Goal: Will remain free from falls  Description: Will remain free from falls  Outcome: Ongoing     Problem: Falls - Risk of:  Goal: Absence of physical injury  Description: Absence of physical injury  Outcome: Ongoing

## 2021-02-24 LAB
ANION GAP SERPL CALCULATED.3IONS-SCNC: 12 MMOL/L (ref 3–16)
BUN BLDV-MCNC: <2 MG/DL (ref 7–20)
CALCIUM SERPL-MCNC: 8.6 MG/DL (ref 8.3–10.6)
CHLORIDE BLD-SCNC: 104 MMOL/L (ref 99–110)
CO2: 24 MMOL/L (ref 21–32)
CREAT SERPL-MCNC: <0.5 MG/DL (ref 0.6–1.2)
GFR AFRICAN AMERICAN: >60
GFR NON-AFRICAN AMERICAN: >60
GLUCOSE BLD-MCNC: 105 MG/DL (ref 70–99)
GLUCOSE BLD-MCNC: 106 MG/DL (ref 70–99)
GLUCOSE BLD-MCNC: 74 MG/DL (ref 70–99)
GLUCOSE BLD-MCNC: 76 MG/DL (ref 70–99)
GLUCOSE BLD-MCNC: 82 MG/DL (ref 70–99)
GLUCOSE BLD-MCNC: 95 MG/DL (ref 70–99)
GLUCOSE BLD-MCNC: 99 MG/DL (ref 70–99)
HCT VFR BLD CALC: 33.5 % (ref 36–48)
HEMOGLOBIN: 11.1 G/DL (ref 12–16)
MAGNESIUM: 1.7 MG/DL (ref 1.8–2.4)
MCH RBC QN AUTO: 29.3 PG (ref 26–34)
MCHC RBC AUTO-ENTMCNC: 33.2 G/DL (ref 31–36)
MCV RBC AUTO: 88.4 FL (ref 80–100)
PDW BLD-RTO: 16.2 % (ref 12.4–15.4)
PERFORMED ON: ABNORMAL
PERFORMED ON: NORMAL
PLATELET # BLD: 227 K/UL (ref 135–450)
PMV BLD AUTO: 7.6 FL (ref 5–10.5)
POTASSIUM REFLEX MAGNESIUM: 2.9 MMOL/L (ref 3.5–5.1)
RBC # BLD: 3.8 M/UL (ref 4–5.2)
SODIUM BLD-SCNC: 140 MMOL/L (ref 136–145)
WBC # BLD: 6.3 K/UL (ref 4–11)

## 2021-02-24 PROCEDURE — 2580000003 HC RX 258: Performed by: SURGERY

## 2021-02-24 PROCEDURE — 6360000002 HC RX W HCPCS: Performed by: INTERNAL MEDICINE

## 2021-02-24 PROCEDURE — 1200000000 HC SEMI PRIVATE

## 2021-02-24 PROCEDURE — 97530 THERAPEUTIC ACTIVITIES: CPT

## 2021-02-24 PROCEDURE — 83735 ASSAY OF MAGNESIUM: CPT

## 2021-02-24 PROCEDURE — 80048 BASIC METABOLIC PNL TOTAL CA: CPT

## 2021-02-24 PROCEDURE — 36415 COLL VENOUS BLD VENIPUNCTURE: CPT

## 2021-02-24 PROCEDURE — 6370000000 HC RX 637 (ALT 250 FOR IP): Performed by: CLINICAL NURSE SPECIALIST

## 2021-02-24 PROCEDURE — 99024 POSTOP FOLLOW-UP VISIT: CPT | Performed by: SURGERY

## 2021-02-24 PROCEDURE — 2700000000 HC OXYGEN THERAPY PER DAY

## 2021-02-24 PROCEDURE — 2500000003 HC RX 250 WO HCPCS: Performed by: CLINICAL NURSE SPECIALIST

## 2021-02-24 PROCEDURE — APPSS45 APP SPLIT SHARED TIME 31-45 MINUTES: Performed by: CLINICAL NURSE SPECIALIST

## 2021-02-24 PROCEDURE — 6360000002 HC RX W HCPCS: Performed by: CLINICAL NURSE SPECIALIST

## 2021-02-24 PROCEDURE — 2580000003 HC RX 258: Performed by: INTERNAL MEDICINE

## 2021-02-24 PROCEDURE — 85027 COMPLETE CBC AUTOMATED: CPT

## 2021-02-24 PROCEDURE — 97535 SELF CARE MNGMENT TRAINING: CPT

## 2021-02-24 PROCEDURE — 94761 N-INVAS EAR/PLS OXIMETRY MLT: CPT

## 2021-02-24 RX ORDER — MAGNESIUM SULFATE IN WATER 40 MG/ML
2000 INJECTION, SOLUTION INTRAVENOUS ONCE
Status: COMPLETED | OUTPATIENT
Start: 2021-02-24 | End: 2021-02-24

## 2021-02-24 RX ORDER — HYDROMORPHONE HCL 110MG/55ML
0.5 PATIENT CONTROLLED ANALGESIA SYRINGE INTRAVENOUS EVERY 4 HOURS PRN
Status: DISCONTINUED | OUTPATIENT
Start: 2021-02-24 | End: 2021-02-27 | Stop reason: HOSPADM

## 2021-02-24 RX ORDER — POTASSIUM CHLORIDE 7.45 MG/ML
10 INJECTION INTRAVENOUS
Status: DISPENSED | OUTPATIENT
Start: 2021-02-24 | End: 2021-02-24

## 2021-02-24 RX ORDER — OXYCODONE HYDROCHLORIDE 5 MG/1
5 TABLET ORAL EVERY 4 HOURS PRN
Status: DISCONTINUED | OUTPATIENT
Start: 2021-02-24 | End: 2021-02-27

## 2021-02-24 RX ADMIN — POTASSIUM CHLORIDE 10 MEQ: 7.46 INJECTION, SOLUTION INTRAVENOUS at 16:37

## 2021-02-24 RX ADMIN — OXYCODONE 5 MG: 5 TABLET ORAL at 21:30

## 2021-02-24 RX ADMIN — OXYCODONE 5 MG: 5 TABLET ORAL at 12:15

## 2021-02-24 RX ADMIN — PIPERACILLIN AND TAZOBACTAM 3375 MG: 3; .375 INJECTION, POWDER, FOR SOLUTION INTRAVENOUS at 18:06

## 2021-02-24 RX ADMIN — PIPERACILLIN AND TAZOBACTAM 3375 MG: 3; .375 INJECTION, POWDER, FOR SOLUTION INTRAVENOUS at 09:31

## 2021-02-24 RX ADMIN — OXYCODONE 5 MG: 5 TABLET ORAL at 16:37

## 2021-02-24 RX ADMIN — HYDROMORPHONE HYDROCHLORIDE 0.5 MG: 2 INJECTION, SOLUTION INTRAMUSCULAR; INTRAVENOUS; SUBCUTANEOUS at 20:26

## 2021-02-24 RX ADMIN — POTASSIUM CHLORIDE 10 MEQ: 7.46 INJECTION, SOLUTION INTRAVENOUS at 20:41

## 2021-02-24 RX ADMIN — PIPERACILLIN AND TAZOBACTAM 3375 MG: 3; .375 INJECTION, POWDER, FOR SOLUTION INTRAVENOUS at 02:32

## 2021-02-24 RX ADMIN — MAGNESIUM SULFATE HEPTAHYDRATE 2000 MG: 40 INJECTION, SOLUTION INTRAVENOUS at 14:28

## 2021-02-24 RX ADMIN — FAMOTIDINE 20 MG: 10 INJECTION, SOLUTION INTRAVENOUS at 09:31

## 2021-02-24 RX ADMIN — SODIUM CHLORIDE, PRESERVATIVE FREE 10 ML: 5 INJECTION INTRAVENOUS at 09:31

## 2021-02-24 RX ADMIN — FAMOTIDINE 20 MG: 10 INJECTION, SOLUTION INTRAVENOUS at 20:26

## 2021-02-24 RX ADMIN — SODIUM CHLORIDE, PRESERVATIVE FREE 10 ML: 5 INJECTION INTRAVENOUS at 20:15

## 2021-02-24 RX ADMIN — POTASSIUM CHLORIDE 10 MEQ: 7.46 INJECTION, SOLUTION INTRAVENOUS at 14:28

## 2021-02-24 ASSESSMENT — PAIN SCALES - GENERAL
PAINLEVEL_OUTOF10: 7
PAINLEVEL_OUTOF10: 8
PAINLEVEL_OUTOF10: 3
PAINLEVEL_OUTOF10: 7
PAINLEVEL_OUTOF10: 6
PAINLEVEL_OUTOF10: 6

## 2021-02-24 ASSESSMENT — PAIN SCALES - WONG BAKER
WONGBAKER_NUMERICALRESPONSE: 8
WONGBAKER_NUMERICALRESPONSE: 8

## 2021-02-24 ASSESSMENT — PAIN DESCRIPTION - PAIN TYPE
TYPE: SURGICAL PAIN

## 2021-02-24 ASSESSMENT — PAIN DESCRIPTION - LOCATION
LOCATION: ABDOMEN
LOCATION: ABDOMEN

## 2021-02-24 ASSESSMENT — PAIN - FUNCTIONAL ASSESSMENT: PAIN_FUNCTIONAL_ASSESSMENT: PREVENTS OR INTERFERES SOME ACTIVE ACTIVITIES AND ADLS

## 2021-02-24 NOTE — PROGRESS NOTES
Pt assessment completed and charted. VSS. Pt a/o. Pt voided 300 ml after bowling removal. RUPAL drain in place and to bulb suction. PCA dilaudid pump in place, pt utilizing. Bed in lowest position and wheels locked. Call light within reach. Bedside table within reach. Non-skid footwear in place. Pt denies any other needs at this time. Pt calls out appropriately. Will continue to monitor.

## 2021-02-24 NOTE — PROGRESS NOTES
Pt A&Ox4. VSS. Shift assessment completed. Pt is heavy assist to commode x 2. Needs a lot of time transferring. Encouraged to use IS. Pain controlled on PCA pump. Call light within reach, bed in lowest position, wheels locked. Bed alarm on and audible.

## 2021-02-24 NOTE — PROGRESS NOTES
Occupational Therapy  Facility/Department: [facility] C3 TELE/MED SURG/ONC  Daily Treatment Note  NAME: Alexy Callaway  : 1950  MRN: [id number]    Date of Service: 2021    Discharge Recommendations:  Subacute/Skilled Nursing Facility     Assessment   Performance deficits / Impairments: Decreased functional mobility ; Decreased safe awareness;Decreased balance;Decreased ADL status; Decreased cognition;Decreased endurance;Decreased strength;Decreased high-level IADLs  Assessment: Pt seen for OT/PT co-tx to safely progress mobility and self-care skills d/t extent of deficits. During session, pt's participation was limited by what appeared to be anxious behavior with pt attempting to direct how therapists were to get her up which was out of compliance with therapy standards. Education provided at length to pt regarding safe mobility (including logrolling in bed, use of walker and gait belt, etc). Pt resistive to therapy education and was preoccupied on set up of her environment including IV lines, placement of chair and commode near her, etc  She has potential to meet OT goals physically but needs continued education on safe technique. Cont skilled OT in acute care. Recommend SNF at d/c. Prognosis: Fair  OT Education: OT Role;Plan of Care;ADL Adaptive Strategies;Precautions;Transfer Training;Energy Conservation;Equipment  Patient Education: disease specific ed:  role of OT, logrolling in bed, use of safety equipment, best practices for safe OOB mobility-pt needs continued education. Barriers to Learning: anxious behavior  REQUIRES OT FOLLOW UP: Yes  Activity Tolerance  Activity Tolerance: Treatment limited secondary to agitation;Treatment limited secondary to decreased cognition  Activity Tolerance: /72, HR 91, O2 sats 98% RA  Safety Devices  Type of devices: Call light within reach; Bed alarm in place;Gait belt;Nurse notified; Left in bed Patient Diagnosis(es): The primary encounter diagnosis was Intra-abdominal abscess ([address] Diagnoses of Acute diverticulitis, Generalized weakness, and Noncompliance with medication regimen were also pertinent to this visit. has a past medical history of Acute hemorrhoid, Depression, Elevated blood pressure reading in office with white coat syndrome, without diagnosis of hypertension, and Osteoarthritis. has a past surgical history that includes Tubal ligation; Breast enhancement surgery (1968); CT PERITONEAL/RETROPERITONEAL PERC DRAIN (2/2/2021); and Small intestine surgery (N/A, 2/18/2021). Restrictions  Restrictions/Precautions  Restrictions/Precautions: Fall Risk, Up as Tolerated, General Precautions  Required Braces or Orthoses?: No  Position Activity Restriction  Other position/activity restrictions: Cristobal, RUPAL, multiple IV lines, PCA pump, abdominal binder  Subjective   General  Chart Reviewed: Yes  Patient assessed for rehabilitation services?: Yes  Family / Caregiver Present: No  Referring Practitioner: D John  Diagnosis: abscess of sigmoid colon  Subjective  Subjective: Pt resting in bed, agreeable to OT  General Comment  Comments: RN approved therapy  Pain Assessment  Pain Assessment: 0-10  Pain Level: 6  Pain Type: Surgical pain  Pain Location: Abdomen  Non-Pharmaceutical Pain Intervention(s): Repositioned; Therapeutic presence  Response to Pain Intervention: Patient Satisfied  Vital Signs  Patient Currently in Pain: Yes   Orientation  Orientation  Overall Orientation Status: Within Functional Limits  Objective    ADL  Feeding: Setup; Beverage management  UE Dressing: Minimal assistance(adjust and tie gown)  LE Dressing: Maximum assistance;Dependent/Total(socks)     Balance  Sitting Balance: Supervision  Standing Balance: (see note below)  Standing Balance Current Treatment Recommendations: Strengthening, Balance Training, Functional Mobility Training, Endurance Training, Safety Education & Training, Self-Care / ADL, Pain Management, Cognitive/Perceptual Training, Positioning, Equipment Evaluation, Education, & procurement, Patient/Caregiver Education & Training  AM-PAC Score   AM-PAC Inpatient Daily Activity Raw Score: 13 (02/24/21 1419)  AM-PAC Inpatient ADL T-Scale Score : 32.03 (02/24/21 1419)  ADL Inpatient CMS 0-100% Score: 63.03 (02/24/21 1419)  ADL Inpatient CMS G-Code Modifier : CL (02/24/21 1419)  Goals  Short term goals  Time Frame for Short term goals: 1-2 weeks 3/8  Short term goal 1: Pt will complete toilet t/f with min A by 2/25. -CHRISTINE 2/24  Short term goal 2: Pt will complete toileting with min A. -CHRISTINE 2/24  Short term goal 3: Pt will complete LB dressing using AE with min A..-ongoing, max/D 2/24  Short term goal 4: Pt will tolerate standing x1 min to increase participation in ADLs. -CHRISTINE 2/24 d/t lack of pt participation  Patient Goals   Patient goals : decrease pain     Therapy Time   Individual Concurrent Group Co-treatment   Time In       0957   Time Out       1040   Minutes       43   Timed Code Treatment Minutes: 37 Minutes    If pt is discharged prior to next OT session, this note will serve as the discharge summary.   Janette Romo OT

## 2021-02-24 NOTE — PROGRESS NOTES
Activity Tolerance: Pt very self-limiting and resistant to majority of pt education. Pt resistant to therapists assisting her when she struggles with performing mobility tasks, also complains about \"being rushed. \"  Pt refused vitals assessment due to anxiety. Patient Diagnosis(es): The primary encounter diagnosis was Intra-abdominal abscess ([address] Diagnoses of Acute diverticulitis, Generalized weakness, and Noncompliance with medication regimen were also pertinent to this visit. has a past medical history of Acute hemorrhoid, Depression, Elevated blood pressure reading in office with white coat syndrome, without diagnosis of hypertension, and Osteoarthritis. has a past surgical history that includes Tubal ligation; Breast enhancement surgery (1968); CT PERITONEAL/RETROPERITONEAL PERC DRAIN (2/2/2021); and Small intestine surgery (N/A, 2/18/2021). Restrictions  Restrictions/Precautions  Restrictions/Precautions: Fall Risk, Up as Tolerated, General Precautions  Required Braces or Orthoses?: No  Position Activity Restriction  Other position/activity restrictions: Cristobal, RUPAL, multiple IV lines, PCA pump, abdominal binder     Subjective   General  Chart Reviewed: Yes  Additional Pertinent Hx: Hx: sepsis, sigmoid absecess  Response To Previous Treatment: Patient with no complaints from previous session. Family / Caregiver Present: No  Referring Practitioner: EMA Bush CNP  Subjective  Subjective: agreeable to PT  General Comment  Comments: Pt resting in bed upon entry of PT, RN cleared pt for therapy  Pain Screening  Patient Currently in Pain: Yes  Pain Assessment  Pain Assessment: 0-10  Pain Level: 6  Pain Type: Acute pain;Surgical pain  Pain Location: Abdomen  Non-Pharmaceutical Pain Intervention(s): Repositioned; Emotional support; Other (Comment)(pt pushes PCA pump)  Vital Signs  Pulse: 93  BP: 121/72  BP Location: Left upper arm  Patient Currently in Pain: Yes  Oxygen Therapy  SpO2: 98 % Orientation  Orientation  Orientation Level: Oriented to time;Oriented to place;Oriented to person;Oriented to situation  Cognition      Objective   Bed mobility  Supine to Sit: Minimal assistance(log roll technique, pt resists education and prefers to do it her way)  Sit to Supine: Minimal assistance  Transfers  Sit to Stand: Unable to assess(Pt sat EOb 30 + minutes with several attemtps/ request to have pt stand but she does not stand. Each time a request is made pt asks for rest and states \"I can't do it this way\" or \" I can't wrap my head around this\" Pt's way is unsafe, she wants to pull on staff to stand and not use walker. Explained to pt purpose of PT/ OT but she cont to insist we allow her to pull up on us pt ultimately refused to stand and went back to supine Elodia)       AM-PAC Score  AM-PAC Inpatient Mobility Raw Score : 8 (02/24/21 1520)  AM-PAC Inpatient T-Scale Score : 28.52 (02/24/21 1520)  Mobility Inpatient CMS 0-100% Score: 86.62 (02/24/21 1520)  Mobility Inpatient CMS G-Code Modifier : CM (02/24/21 1520)          Goals  Short term goals  Time Frame for Short term goals: 1 week 2/28/21 (unless otherwise specified)  Short term goal 1: Pt will complete supine to/from sit with modA - MET 2/22/21 (performed bed mobility with CGA x 1). Goal upgraded: Pt will transfer supine <-> sit with SBA/supervision. ; 2/24 min A to R  Short term goal 2: Pt will complete sit to/from stand and bed <> chair t/f with LRAD with modA x 1; 2/24 CHRISTINE see above  Short term goal 3: Pt will tolerate gait assessment and appropriate goal to be set; 2/24 CHRISTINE  Short term goal 4: 2/25/21: Pt will participate in 12-15 reps BLE exercises to increase strength and increase I with functional mobility; 2/24 CHRISTINE  Patient Goals   Patient goals : \"I want to take baby steps, sit up on the edge of the bed only\"    Plan    Plan  Times per week: 3-5x/week in acute care  Times per day: Daily Specific instructions for Next Treatment: Progress mobility as tolerated  Current Treatment Recommendations: Strengthening, Home Exercise Program, Safety Education & Training, Balance Training, Endurance Training, Patient/Caregiver Education & Training, Functional Mobility Training, Transfer Training, Gait Training, Positioning  Safety Devices  Type of devices:  All fall risk precautions in place, Call light within reach, Bed alarm in place, Gait belt, Left in bed, Nurse notified  Restraints  Initially in place: No     Therapy Time   Individual Concurrent Group Co-treatment   Time In 0957         Time Out 1040         Minutes [address], [place] [id number]

## 2021-02-24 NOTE — PROGRESS NOTES
[facility] GENERAL SURGERY    Surgery Progress Note           POD # 6    PATIENT NAME: Clement Nicole     TODAY'S DATE: 2/24/2021    INTERVAL HISTORY:    Pt reports she is passing flatus, she remains reluctant to move and resistant to therapy and nursing suggestions but is getting up to bedside commode. Tearful at times during discussion. OBJECTIVE:   VITALS:  /72   Pulse 93   Temp 97.7 °F (36.5 °C) (Axillary)   Resp 18   Ht 5' 5\" (1.651 m)   Wt 133 lb 9.6 oz (60.6 kg)   SpO2 98%   BMI 22.23 kg/m²     INTAKE/OUTPUT:    I/O last 3 completed shifts: In: 2340.3 [P.O.:120; I.V.:1734; IV Piggyback:486.3]  Out: 6900 [Urine:2700; Drains:65]  I/O this shift: In: 720 [I.V.:720]  Out: 200 [Urine:200]              CONSTITUTIONAL:  awake and alert  LUNGS:  no crackles or wheezing  ABDOMEN:    soft, non-distended, tenderness noted appropriate, keegan in place serosanguinous   INCISION:  staples intact no erythema, old drain site dry    Data:  CBC:   Recent Labs     02/22/21  0524   WBC 5.9   HGB 10.1*   HCT 30.0*        BMP:    Recent Labs     02/22/21  0524 02/23/21  0013 02/23/21  0543 02/23/21  1752     --  140  --    K 3.0* 3.0* 3.3* 3.2*     --  105  --    CO2 27  --  29  --    BUN <2*  --  <2*  --    CREATININE <0.5*  --  <0.5*  --    GLUCOSE 86  --  90  --      Hepatic:   No results for input(s): AST, ALT, ALB, BILITOT, ALKPHOS in the last 72 hours. ASSESSMENT AND PLAN:  72 y.o. female status post EXPLORATORY LAPAROTOMY, SIGMOIDECTOMY WITH ANASTOMOSIS    GI:  Continue with full liquid diet  ID: continue with IV antibiotics. Pain: stop PCA, PO pain medicine, IV for breakthrough  Activity: increase activity  - PT/OT   Hypokalemia: replace today     Dispo: SNF recommended [facility]    Electronically signed by EMA Banks CNP       Patient seen and agree with above.     Gin Herron MD

## 2021-02-24 NOTE — PROGRESS NOTES
Perfect serve message sent to Irma Lopez MD, \"FYI pt potassium level was 3.2 on recheck after replacement. \", awaiting response.     Provider aware, no new orders

## 2021-02-24 NOTE — PROGRESS NOTES
Pt called stating: \" I need the wound . She needs to clean my wound\". Asked pt show me her wound  And she pointed to her midline staples. Pt stated that she does not want this RN to touch her and he will wait for the wound .

## 2021-02-24 NOTE — CARE COORDINATION
3rd call placed to admissions at [facility] to determine status of referral waiting return call. Kelly Warren RN      Spoke with [facility] to accept patient wean weaned off PCA and pain managed with po meds. Daughter, April updated on plan of care.  Will need covid testing prior to d/c. Kelly Warren RN

## 2021-02-25 LAB
ANION GAP SERPL CALCULATED.3IONS-SCNC: 7 MMOL/L (ref 3–16)
BUN BLDV-MCNC: <2 MG/DL (ref 7–20)
CALCIUM SERPL-MCNC: 8 MG/DL (ref 8.3–10.6)
CHLORIDE BLD-SCNC: 108 MMOL/L (ref 99–110)
CO2: 28 MMOL/L (ref 21–32)
CREAT SERPL-MCNC: <0.5 MG/DL (ref 0.6–1.2)
GFR AFRICAN AMERICAN: >60
GFR NON-AFRICAN AMERICAN: >60
GLUCOSE BLD-MCNC: 115 MG/DL (ref 70–99)
GLUCOSE BLD-MCNC: 132 MG/DL (ref 70–99)
GLUCOSE BLD-MCNC: 79 MG/DL (ref 70–99)
GLUCOSE BLD-MCNC: 86 MG/DL (ref 70–99)
GLUCOSE BLD-MCNC: 90 MG/DL (ref 70–99)
GLUCOSE BLD-MCNC: 97 MG/DL (ref 70–99)
GLUCOSE BLD-MCNC: 98 MG/DL (ref 70–99)
MAGNESIUM: 2.2 MG/DL (ref 1.8–2.4)
PERFORMED ON: ABNORMAL
PERFORMED ON: ABNORMAL
PERFORMED ON: NORMAL
POTASSIUM SERPL-SCNC: 3.1 MMOL/L (ref 3.5–5.1)
SODIUM BLD-SCNC: 143 MMOL/L (ref 136–145)

## 2021-02-25 PROCEDURE — 1200000000 HC SEMI PRIVATE

## 2021-02-25 PROCEDURE — 6360000002 HC RX W HCPCS: Performed by: INTERNAL MEDICINE

## 2021-02-25 PROCEDURE — 6370000000 HC RX 637 (ALT 250 FOR IP): Performed by: SURGERY

## 2021-02-25 PROCEDURE — 6370000000 HC RX 637 (ALT 250 FOR IP): Performed by: CLINICAL NURSE SPECIALIST

## 2021-02-25 PROCEDURE — 97530 THERAPEUTIC ACTIVITIES: CPT

## 2021-02-25 PROCEDURE — APPSS45 APP SPLIT SHARED TIME 31-45 MINUTES: Performed by: CLINICAL NURSE SPECIALIST

## 2021-02-25 PROCEDURE — 99024 POSTOP FOLLOW-UP VISIT: CPT | Performed by: SURGERY

## 2021-02-25 PROCEDURE — 2580000003 HC RX 258: Performed by: SURGERY

## 2021-02-25 PROCEDURE — 83735 ASSAY OF MAGNESIUM: CPT

## 2021-02-25 PROCEDURE — 6360000002 HC RX W HCPCS: Performed by: CLINICAL NURSE SPECIALIST

## 2021-02-25 PROCEDURE — 80048 BASIC METABOLIC PNL TOTAL CA: CPT

## 2021-02-25 PROCEDURE — 6360000002 HC RX W HCPCS: Performed by: SURGERY

## 2021-02-25 PROCEDURE — 2580000003 HC RX 258: Performed by: INTERNAL MEDICINE

## 2021-02-25 PROCEDURE — 36415 COLL VENOUS BLD VENIPUNCTURE: CPT

## 2021-02-25 PROCEDURE — 2500000003 HC RX 250 WO HCPCS: Performed by: CLINICAL NURSE SPECIALIST

## 2021-02-25 RX ORDER — POTASSIUM CHLORIDE 750 MG/1
10 TABLET, EXTENDED RELEASE ORAL 3 TIMES DAILY
Status: DISCONTINUED | OUTPATIENT
Start: 2021-02-25 | End: 2021-02-27 | Stop reason: HOSPADM

## 2021-02-25 RX ORDER — LIDOCAINE 4 G/G
1 PATCH TOPICAL DAILY
Status: DISCONTINUED | OUTPATIENT
Start: 2021-02-25 | End: 2021-02-27 | Stop reason: HOSPADM

## 2021-02-25 RX ADMIN — OXYCODONE 5 MG: 5 TABLET ORAL at 13:08

## 2021-02-25 RX ADMIN — SODIUM CHLORIDE: 9 INJECTION, SOLUTION INTRAVENOUS at 09:15

## 2021-02-25 RX ADMIN — POTASSIUM CHLORIDE 10 MEQ: 750 TABLET, EXTENDED RELEASE ORAL at 21:39

## 2021-02-25 RX ADMIN — ONDANSETRON 4 MG: 2 INJECTION INTRAMUSCULAR; INTRAVENOUS at 11:40

## 2021-02-25 RX ADMIN — OXYCODONE 5 MG: 5 TABLET ORAL at 09:10

## 2021-02-25 RX ADMIN — OXYCODONE 5 MG: 5 TABLET ORAL at 21:39

## 2021-02-25 RX ADMIN — HYDROMORPHONE HYDROCHLORIDE 0.5 MG: 2 INJECTION, SOLUTION INTRAMUSCULAR; INTRAVENOUS; SUBCUTANEOUS at 06:52

## 2021-02-25 RX ADMIN — PIPERACILLIN AND TAZOBACTAM 3375 MG: 3; .375 INJECTION, POWDER, FOR SOLUTION INTRAVENOUS at 17:30

## 2021-02-25 RX ADMIN — HYDROMORPHONE HYDROCHLORIDE 0.5 MG: 2 INJECTION, SOLUTION INTRAMUSCULAR; INTRAVENOUS; SUBCUTANEOUS at 11:45

## 2021-02-25 RX ADMIN — HYDROMORPHONE HYDROCHLORIDE 0.5 MG: 2 INJECTION, SOLUTION INTRAMUSCULAR; INTRAVENOUS; SUBCUTANEOUS at 15:44

## 2021-02-25 RX ADMIN — OXYCODONE 5 MG: 5 TABLET ORAL at 17:29

## 2021-02-25 RX ADMIN — PIPERACILLIN AND TAZOBACTAM 3375 MG: 3; .375 INJECTION, POWDER, FOR SOLUTION INTRAVENOUS at 09:10

## 2021-02-25 RX ADMIN — ONDANSETRON 4 MG: 2 INJECTION INTRAMUSCULAR; INTRAVENOUS at 04:42

## 2021-02-25 RX ADMIN — FAMOTIDINE 20 MG: 10 INJECTION, SOLUTION INTRAVENOUS at 20:08

## 2021-02-25 RX ADMIN — FAMOTIDINE 20 MG: 10 INJECTION, SOLUTION INTRAVENOUS at 09:10

## 2021-02-25 RX ADMIN — HYDROMORPHONE HYDROCHLORIDE 0.5 MG: 2 INJECTION, SOLUTION INTRAMUSCULAR; INTRAVENOUS; SUBCUTANEOUS at 00:50

## 2021-02-25 RX ADMIN — OXYCODONE 5 MG: 5 TABLET ORAL at 04:29

## 2021-02-25 RX ADMIN — HYDROMORPHONE HYDROCHLORIDE 0.5 MG: 2 INJECTION, SOLUTION INTRAMUSCULAR; INTRAVENOUS; SUBCUTANEOUS at 20:08

## 2021-02-25 RX ADMIN — PIPERACILLIN AND TAZOBACTAM 3375 MG: 3; .375 INJECTION, POWDER, FOR SOLUTION INTRAVENOUS at 02:02

## 2021-02-25 RX ADMIN — ONDANSETRON 4 MG: 2 INJECTION INTRAMUSCULAR; INTRAVENOUS at 17:30

## 2021-02-25 ASSESSMENT — PAIN SCALES - GENERAL
PAINLEVEL_OUTOF10: 7
PAINLEVEL_OUTOF10: 8
PAINLEVEL_OUTOF10: 7
PAINLEVEL_OUTOF10: 8
PAINLEVEL_OUTOF10: 8
PAINLEVEL_OUTOF10: 7
PAINLEVEL_OUTOF10: 7
PAINLEVEL_OUTOF10: 8

## 2021-02-25 ASSESSMENT — PAIN DESCRIPTION - ORIENTATION: ORIENTATION: MID;LOWER

## 2021-02-25 ASSESSMENT — PAIN - FUNCTIONAL ASSESSMENT: PAIN_FUNCTIONAL_ASSESSMENT: PREVENTS OR INTERFERES SOME ACTIVE ACTIVITIES AND ADLS

## 2021-02-25 NOTE — PROGRESS NOTES
Shift assessment completed. Pt A&Ox3, VSS. Pt C/O 8/10 abdominal pain, PRN pain medication given per MAR. Pt has a midline incision with staples, open to air, and a right RUPAL drain. Pt requires a lot of encouragement to stand on her own, but is able with assistance. Bed locked and in lowest position. Call light & bedside table are within reach. Bed alarm in place for safety.

## 2021-02-25 NOTE — PROGRESS NOTES
Pt alert and oriented, VSS. Shift assessment completed and documented. Pt denies any needs at this time. Midline incision C/D/I, staples in place, LOVE. Vinod drain in place. Pt sat in chair for 30 minutes, back in bed now. Bed locked and in lowest position. Call light within reach.

## 2021-02-25 NOTE — PROGRESS NOTES
Physical Therapy  Facility/Department: [facility] C3 TELE/MED SURG/ONC  Daily Treatment Note  NAME: Bernardo Lester  : 1950  MRN: [id number]    Date of Service: 2021    Discharge Recommendations:  Subacute/Skilled Nursing Facility   PT Equipment Recommendations  Equipment Needed: No  Other: defer to next level of care    Assessment   Body structures, Functions, Activity limitations: Decreased functional mobility ; Increased pain;Decreased balance;Decreased ROM; Decreased strength;Decreased safe awareness;Decreased endurance;Decreased posture  Assessment: PT tx session focused on bed mobility, functional transfers and extensive education. Pt can be self-limiting, anxious, and resistant to majority of education limiting her progress in therapy. She was able to perform stand pivot transfer bed<>BSC with min A, however is very particular in how she wants to setup/perform the transfer despite max cues/education for safety. Pt becomes anxious if you attempt to assist her in any way other than what she requests. Pt was educated regarding protecting abdominal incision via bed mobility technique, safe transfer and guarding techniques, and next steps in her POC which include taking steps with an AD. Pt ultimately stated, \"I will think about what we've talked about. \" but will require reinforcement. Continue to recommend SNF at d/c due to pt's current functional mobility status and high fall/readmission risk due to her unsafe and noncompliant behaviors. Treatment Diagnosis: Impaired functional mobility and gait  Specific instructions for Next Treatment: Progress mobility as tolerated  Prognosis: Fair  Decision Making: Medium Complexity  PT Education: Goals; General Safety;PT Role;Disease Specific Education;Plan of Care; Functional Mobility Training;Precautions; Injury Prevention;Transfer Training;Energy Conservation;Equipment Patient Education: Pt educated regarding importance of OOB mobility and sitting in chair, safe techniques with use of AD. Pt resistant to education, demonstrates very minimal evidence of learning, will require reinforcement. Barriers to Learning: Resistant to all education, self-limiting, anxious  REQUIRES PT FOLLOW UP: Yes  Activity Tolerance  Activity Tolerance: Patient limited by pain; Patient limited by endurance  Activity Tolerance: C/o feeling \"weak\" post transfer, VSS throughout. Patient Diagnosis(es): The primary encounter diagnosis was Intra-abdominal abscess ([address] Diagnoses of Acute diverticulitis, Generalized weakness, and Noncompliance with medication regimen were also pertinent to this visit. has a past medical history of Acute hemorrhoid, Depression, Elevated blood pressure reading in office with white coat syndrome, without diagnosis of hypertension, and Osteoarthritis. has a past surgical history that includes Tubal ligation; Breast enhancement surgery (1968); CT PERITONEAL/RETROPERITONEAL PERC DRAIN (2/2/2021); and Small intestine surgery (N/A, 2/18/2021). Restrictions  Restrictions/Precautions  Restrictions/Precautions: Fall Risk, Up as Tolerated, General Precautions  Required Braces or Orthoses?: No  Position Activity Restriction  Other position/activity restrictions: RUPAL, multiple IV lines, PCA pump, abdominal binder  Subjective   General  Chart Reviewed: Yes  Additional Pertinent Hx: Hx: sepsis, sigmoid absecess  Response To Previous Treatment: Patient with no complaints from previous session. Family / Caregiver Present: No  Referring Practitioner: EMA Luis CNP  Subjective  Subjective: Agreeable to PT, c/o fatigue as she sat in chair earlier.   General Comment  Comments: Pt resting in bed upon entry of PT, RN cleared pt for therapy  Pain Screening  Patient Currently in Pain: Yes  Pain Assessment  Pain Assessment: 0-10  Pain Level: 7 Comment: Pt sets up [facility] directly in front of her in order to perform a full turn and sit. Educated pt regarding safer technique of setting up BSC at near 90 degree angle to minimize fall risk but pt not receptive to education and refusing to perform this way. Pt relies on bed rail and BSC armrest for UE support. Requests that PT assist via pulling under arms but was educated that this was not safe and she did allow a gait belt to be used. Pt ultimately completed with min A for balance. Ambulation  Ambulation?: No(Pt reports she is too fatigued to trial. Pt was educated that right now she requires UE support to perform transfers and therefore will require walker to be utilized during first ambulation attempt. Pt agreeable to trial with RW next session.)  Stairs/Curb  Stairs?: No     Balance  Posture: Fair  Sitting - Static: Good;-  Sitting - Dynamic: Fair  Standing - Static: Fair  Standing - Dynamic: Fair;-  Exercises  Comments: Pt declined secondary to fatigue post transfer. Comment: Instead of sitting fully on BSC, pt maintained a \"W/C pushup\" position while urinating, able to perform pericare while in partial squat position with setup assist and CGA. AM-PAC Score  AM-PAC Inpatient Mobility Raw Score : 15 (02/25/21 1625)  AM-PAC Inpatient T-Scale Score : 39.45 (02/25/21 1625)  Mobility Inpatient CMS 0-100% Score: 57.7 (02/25/21 1625)  Mobility Inpatient CMS G-Code Modifier : CK (02/25/21 1625)          Goals  Short term goals  Time Frame for Short term goals: 1 week 2/28/21 (unless otherwise specified)  Short term goal 1: Pt will complete supine to/from sit with modA - MET 2/22/21 (performed bed mobility with CGA x 1). Goal upgraded: Pt will transfer supine <-> sit with SBA/supervision. ; 2/25 SBA but unsafe technique given abdominal incision. Short term goal 2: Pt will complete sit to/from stand and bed <> chair t/f with LRAD with modA x 1; 2/25 MET min A. New goal: Pt will complete sit<>stand and bed<>chair transfer with SBA. Short term goal 3: Pt will tolerate gait assessment and appropriate goal to be set; 2/25 unable to assess  Short term goal 4: 2/25/21: Pt will participate in 12-15 reps BLE exercises to increase strength and increase I with functional mobility; 2/25 unable to assess d/t fatigue. Patient Goals   Patient goals : \"I want to take baby steps, sit up on the edge of the bed only\"    Plan    Plan  Times per week: 3-5x/week in acute care  Times per day: Daily  Specific instructions for Next Treatment: Progress mobility as tolerated  Current Treatment Recommendations: Strengthening, Home Exercise Program, Safety Education & Training, Balance Training, Endurance Training, Patient/Caregiver Education & Training, Functional Mobility Training, Transfer Training, Gait Training, Positioning  Safety Devices  Type of devices: All fall risk precautions in place, Bed alarm in place, Call light within reach, Gait belt, Nurse notified, Left in bed  Restraints  Initially in place: No     Therapy Time   Individual Concurrent Group Co-treatment   Time In 1446         Time Out 1510         Minutes 24         Timed Code Treatment Minutes: 24 Minutes       Earlene Garcia PT     If pt is unable to be seen after this session, please let this note serve as discharge summary. Please see case management note for discharge disposition. Thank you.

## 2021-02-25 NOTE — CARE COORDINATION
Chart reviewed day 10. Spoke with Saeid Zeng NP with surgery. PCA off needs pain control Plan for [facility] when medically ready.  Will need rapid covid at d/c. Pj Hogue RN

## 2021-02-25 NOTE — PROGRESS NOTES
metaplasia) extending to both opposing specimen resections edges -          see comment. ASSESSMENT AND PLAN:  72 y.o. female status post sigmoidectomy for focal severe diverticulitis.    - cont w/ full liquids today, await further bowel function and resolution of nausea   - increase up OOB   - Prophy - cont Lovenox, Pepcid   - PT/OT        Electronically signed by Logan Callaway MD

## 2021-02-25 NOTE — PLAN OF CARE
Problem: Falls - Risk of:  Goal: Will remain free from falls  Description: Will remain free from falls  2/25/2021 1203 by Aleyda Villela RN  Outcome: Ongoing  2/25/2021 0011 by Alanis Espinoza RN  Outcome: Ongoing  Goal: Absence of physical injury  Description: Absence of physical injury  2/25/2021 1203 by Aleyda Villela RN  Outcome: Ongoing  2/25/2021 0011 by Alanis Espinoza RN  Outcome: Ongoing     Problem: Pain:  Goal: Pain level will decrease  Description: Pain level will decrease  2/25/2021 1203 by Aleyda Villela RN  Outcome: Ongoing  2/25/2021 0011 by Alanis Espinoza RN  Outcome: Ongoing  Goal: Control of acute pain  Description: Control of acute pain  2/25/2021 1203 by Aleyda Villela RN  Outcome: Ongoing  2/25/2021 0011 by Alanis Espinoza RN  Outcome: Ongoing  Goal: Control of chronic pain  Description: Control of chronic pain  2/25/2021 0011 by Alanis Espinoza RN  Outcome: Ongoing     Problem: Skin Integrity:  Goal: Will show no infection signs and symptoms  Description: Will show no infection signs and symptoms  2/25/2021 1203 by Aleyda Villela RN  Outcome: Ongoing  2/25/2021 0011 by Alanis Espinoza RN  Outcome: Ongoing  Goal: Absence of new skin breakdown  Description: Absence of new skin breakdown  2/25/2021 1203 by Aleyda Villela RN  Outcome: Ongoing  2/25/2021 0011 by Alanis Espinoza RN  Outcome: Ongoing

## 2021-02-26 LAB
ANION GAP SERPL CALCULATED.3IONS-SCNC: 8 MMOL/L (ref 3–16)
BUN BLDV-MCNC: <2 MG/DL (ref 7–20)
CALCIUM SERPL-MCNC: 8.2 MG/DL (ref 8.3–10.6)
CHLORIDE BLD-SCNC: 106 MMOL/L (ref 99–110)
CO2: 27 MMOL/L (ref 21–32)
CREAT SERPL-MCNC: <0.5 MG/DL (ref 0.6–1.2)
GFR AFRICAN AMERICAN: >60
GFR NON-AFRICAN AMERICAN: >60
GLUCOSE BLD-MCNC: 119 MG/DL (ref 70–99)
GLUCOSE BLD-MCNC: 131 MG/DL (ref 70–99)
GLUCOSE BLD-MCNC: 81 MG/DL (ref 70–99)
GLUCOSE BLD-MCNC: 84 MG/DL (ref 70–99)
GLUCOSE BLD-MCNC: 91 MG/DL (ref 70–99)
GLUCOSE BLD-MCNC: 94 MG/DL (ref 70–99)
PERFORMED ON: ABNORMAL
PERFORMED ON: NORMAL
POTASSIUM SERPL-SCNC: 3 MMOL/L (ref 3.5–5.1)
SODIUM BLD-SCNC: 141 MMOL/L (ref 136–145)

## 2021-02-26 PROCEDURE — 2580000003 HC RX 258: Performed by: SURGERY

## 2021-02-26 PROCEDURE — 6360000002 HC RX W HCPCS: Performed by: CLINICAL NURSE SPECIALIST

## 2021-02-26 PROCEDURE — 6370000000 HC RX 637 (ALT 250 FOR IP): Performed by: CLINICAL NURSE SPECIALIST

## 2021-02-26 PROCEDURE — APPNB45 APP NON BILLABLE 31-45 MINUTES: Performed by: CLINICAL NURSE SPECIALIST

## 2021-02-26 PROCEDURE — APPSS45 APP SPLIT SHARED TIME 31-45 MINUTES: Performed by: CLINICAL NURSE SPECIALIST

## 2021-02-26 PROCEDURE — 2500000003 HC RX 250 WO HCPCS: Performed by: CLINICAL NURSE SPECIALIST

## 2021-02-26 PROCEDURE — 99024 POSTOP FOLLOW-UP VISIT: CPT | Performed by: SURGERY

## 2021-02-26 PROCEDURE — 97530 THERAPEUTIC ACTIVITIES: CPT

## 2021-02-26 PROCEDURE — 97110 THERAPEUTIC EXERCISES: CPT

## 2021-02-26 PROCEDURE — 80048 BASIC METABOLIC PNL TOTAL CA: CPT

## 2021-02-26 PROCEDURE — 6360000002 HC RX W HCPCS: Performed by: SURGERY

## 2021-02-26 PROCEDURE — 2580000003 HC RX 258: Performed by: INTERNAL MEDICINE

## 2021-02-26 PROCEDURE — 6370000000 HC RX 637 (ALT 250 FOR IP): Performed by: SURGERY

## 2021-02-26 PROCEDURE — 6360000002 HC RX W HCPCS: Performed by: INTERNAL MEDICINE

## 2021-02-26 PROCEDURE — 36415 COLL VENOUS BLD VENIPUNCTURE: CPT

## 2021-02-26 PROCEDURE — 1200000000 HC SEMI PRIVATE

## 2021-02-26 RX ORDER — AMOXICILLIN AND CLAVULANATE POTASSIUM 875; 125 MG/1; MG/1
1 TABLET, FILM COATED ORAL EVERY 12 HOURS SCHEDULED
Status: DISCONTINUED | OUTPATIENT
Start: 2021-02-26 | End: 2021-02-27 | Stop reason: HOSPADM

## 2021-02-26 RX ADMIN — HYDROMORPHONE HYDROCHLORIDE 0.5 MG: 2 INJECTION, SOLUTION INTRAMUSCULAR; INTRAVENOUS; SUBCUTANEOUS at 08:55

## 2021-02-26 RX ADMIN — ONDANSETRON 4 MG: 2 INJECTION INTRAMUSCULAR; INTRAVENOUS at 00:18

## 2021-02-26 RX ADMIN — OXYCODONE 5 MG: 5 TABLET ORAL at 05:57

## 2021-02-26 RX ADMIN — PIPERACILLIN AND TAZOBACTAM 3375 MG: 3; .375 INJECTION, POWDER, FOR SOLUTION INTRAVENOUS at 01:37

## 2021-02-26 RX ADMIN — Medication 10 ML: at 19:08

## 2021-02-26 RX ADMIN — FAMOTIDINE 20 MG: 10 INJECTION, SOLUTION INTRAVENOUS at 21:41

## 2021-02-26 RX ADMIN — ONDANSETRON 4 MG: 2 INJECTION INTRAMUSCULAR; INTRAVENOUS at 06:29

## 2021-02-26 RX ADMIN — OXYCODONE 5 MG: 5 TABLET ORAL at 23:59

## 2021-02-26 RX ADMIN — HYDROMORPHONE HYDROCHLORIDE 0.5 MG: 2 INJECTION, SOLUTION INTRAMUSCULAR; INTRAVENOUS; SUBCUTANEOUS at 18:02

## 2021-02-26 RX ADMIN — AMOXICILLIN AND CLAVULANATE POTASSIUM 1 TABLET: 875; 125 TABLET, FILM COATED ORAL at 12:36

## 2021-02-26 RX ADMIN — OXYCODONE 5 MG: 5 TABLET ORAL at 10:27

## 2021-02-26 RX ADMIN — SODIUM CHLORIDE, PRESERVATIVE FREE 10 ML: 5 INJECTION INTRAVENOUS at 08:54

## 2021-02-26 RX ADMIN — OXYCODONE 5 MG: 5 TABLET ORAL at 19:07

## 2021-02-26 RX ADMIN — FAMOTIDINE 20 MG: 10 INJECTION, SOLUTION INTRAVENOUS at 08:54

## 2021-02-26 RX ADMIN — HYDROMORPHONE HYDROCHLORIDE 0.5 MG: 2 INJECTION, SOLUTION INTRAMUSCULAR; INTRAVENOUS; SUBCUTANEOUS at 22:50

## 2021-02-26 RX ADMIN — PIPERACILLIN AND TAZOBACTAM 3375 MG: 3; .375 INJECTION, POWDER, FOR SOLUTION INTRAVENOUS at 09:09

## 2021-02-26 RX ADMIN — ONDANSETRON 4 MG: 2 INJECTION INTRAMUSCULAR; INTRAVENOUS at 19:07

## 2021-02-26 RX ADMIN — SODIUM CHLORIDE, PRESERVATIVE FREE 10 ML: 5 INJECTION INTRAVENOUS at 21:41

## 2021-02-26 RX ADMIN — SODIUM CHLORIDE: 9 INJECTION, SOLUTION INTRAVENOUS at 12:37

## 2021-02-26 RX ADMIN — POTASSIUM CHLORIDE 10 MEQ: 750 TABLET, EXTENDED RELEASE ORAL at 08:55

## 2021-02-26 RX ADMIN — OXYCODONE 5 MG: 5 TABLET ORAL at 15:04

## 2021-02-26 RX ADMIN — AMOXICILLIN AND CLAVULANATE POTASSIUM 1 TABLET: 875; 125 TABLET, FILM COATED ORAL at 21:42

## 2021-02-26 RX ADMIN — ONDANSETRON 4 MG: 2 INJECTION INTRAMUSCULAR; INTRAVENOUS at 12:36

## 2021-02-26 RX ADMIN — HYDROMORPHONE HYDROCHLORIDE 0.5 MG: 2 INJECTION, SOLUTION INTRAMUSCULAR; INTRAVENOUS; SUBCUTANEOUS at 04:23

## 2021-02-26 RX ADMIN — POTASSIUM CHLORIDE 10 MEQ: 750 TABLET, EXTENDED RELEASE ORAL at 21:42

## 2021-02-26 RX ADMIN — HYDROMORPHONE HYDROCHLORIDE 0.5 MG: 2 INJECTION, SOLUTION INTRAMUSCULAR; INTRAVENOUS; SUBCUTANEOUS at 13:33

## 2021-02-26 RX ADMIN — HYDROMORPHONE HYDROCHLORIDE 0.5 MG: 2 INJECTION, SOLUTION INTRAMUSCULAR; INTRAVENOUS; SUBCUTANEOUS at 00:18

## 2021-02-26 RX ADMIN — POTASSIUM CHLORIDE 10 MEQ: 750 TABLET, EXTENDED RELEASE ORAL at 12:36

## 2021-02-26 RX ADMIN — OXYCODONE 5 MG: 5 TABLET ORAL at 01:48

## 2021-02-26 ASSESSMENT — PAIN SCALES - GENERAL
PAINLEVEL_OUTOF10: 7
PAINLEVEL_OUTOF10: 6

## 2021-02-26 ASSESSMENT — PAIN SCALES - WONG BAKER
WONGBAKER_NUMERICALRESPONSE: 8

## 2021-02-26 ASSESSMENT — PAIN DESCRIPTION - PAIN TYPE: TYPE: ACUTE PAIN;SURGICAL PAIN

## 2021-02-26 NOTE — PLAN OF CARE
Problem: Falls - Risk of:  Goal: Will remain free from falls  Description: Will remain free from falls  2/26/2021 1329 by Brandon Lopez RN  Outcome: Ongoing  2/26/2021 0123 by Alejandro Azul RN  Outcome: Ongoing  Goal: Absence of physical injury  Description: Absence of physical injury  2/26/2021 1329 by Brandon Lopez RN  Outcome: Ongoing  2/26/2021 0123 by Alejandro Azul RN  Outcome: Ongoing     Problem: Pain:  Goal: Pain level will decrease  Description: Pain level will decrease  2/26/2021 0123 by Alejandro Azul RN  Outcome: Ongoing  Goal: Control of acute pain  Description: Control of acute pain  2/26/2021 0123 by Alejandro Azul RN  Outcome: Ongoing  Goal: Control of chronic pain  Description: Control of chronic pain  2/26/2021 0123 by Alejandro Azul RN  Outcome: Ongoing     Problem: Skin Integrity:  Goal: Will show no infection signs and symptoms  Description: Will show no infection signs and symptoms  2/26/2021 0123 by Alejandro Azul RN  Outcome: Ongoing  Goal: Absence of new skin breakdown  Description: Absence of new skin breakdown  2/26/2021 0123 by Alejandro Azul RN  Outcome: Ongoing     Problem: Falls - Risk of: Intervention: Assess risk factors for falls  Note: Pt will remain free from falls  Intervention: Postfall assessment  Note: No falls sustained thus far this shift  Intervention: Manage a safe environment  Note: Call light within reach. Bed side table within reach. Wheels locked. Bed in lowest position. Bed check in place. Pt instructed to call out for assistance. Pt expressed understanding & calls out appropriately.      Intervention: Toileting assistance  Note: Pt calls out for toileting assistance

## 2021-02-26 NOTE — PROGRESS NOTES
[facility] GENERAL SURGERY    Surgery Progress Note           POD # 8    PATIENT NAME: Patrice Luther     TODAY'S DATE: 2/26/2021    INTERVAL HISTORY:    Pt  Had a BM this AM. Tolerating full liquids. OBJECTIVE:   VITALS:  /77   Pulse 82   Temp 97.6 °F (36.4 °C) (Axillary)   Resp 15   Ht 5' 5\" (1.651 m)   Wt 131 lb 8 oz (59.6 kg)   SpO2 97%   BMI 21.88 kg/m²     INTAKE/OUTPUT:    I/O last 3 completed shifts: In: 2417 [P.O.:1200; I.V.:1217]  Out: 1890 [Urine:1850; Drains:40]  I/O this shift:  In: -   Out: 10 [Drains:10]              CONSTITUTIONAL:  awake and alert  LUNGS:  clear to auscultation  ABDOMEN:  + bowel sounds, soft, non-distended, keegan serosanguinous     INCISION: clean, dry, no drainage, healing    Data:  CBC:   Recent Labs     02/24/21  1313   WBC 6.3   HGB 11.1*   HCT 33.5*        BMP:    Recent Labs     02/24/21  1313 02/25/21  0519 02/26/21  1052    143 141   K 2.9* 3.1* 3.0*    108 106   CO2 24 28 27   BUN <2* <2* <2*   CREATININE <0.5* <0.5* <0.5*   GLUCOSE 105* 98 119*     Hepatic: No results for input(s): AST, ALT, ALB, BILITOT, ALKPHOS in the last 72 hours. Mag:      Recent Labs     02/24/21  1313 02/25/21  0519   MG 1.70* 2.20      Phos:   No results for input(s): PHOS in the last 72 hours. INR: No results for input(s): INR in the last 72 hours. ASSESSMENT AND PLAN:  72 y.o. female status post sigmoidectomy for focal severe diverticulitis. GI: low fiber diet today  ID: change antibiotics to PO  Activity: continue PT/OT  Prophy: lovenox and pepcid. Hypokalemia: on oral replacement  Dispo: plan for d/c tomorrow to SNF if continues to progress    Electronically signed by EMA Clement - BUCK     Surgery Staff    I have examined this patient and read and agree with the note by Richard Rodriguez CNP from today. Hopefully ready for d/c to [facility] tomorrow.   Will d/c KEEGAN at time of D/C    [address] [facility]

## 2021-02-26 NOTE — PROGRESS NOTES
Occupational Therapy  Facility/Department: [facility] C3 TELE/MED SURG/ONC  Daily Treatment Note  NAME: Tigre Bernard  : 1950  MRN: [id number]    Date of Service: 2021    Discharge Recommendations:  Subacute/Skilled Nursing Facility   Barriers to home discharge:   [x] Reported available assist at home upon discharge limited /lives alone           Assessment   Performance deficits / Impairments: Decreased functional mobility ; Decreased ADL status; Decreased safe awareness;Decreased balance;Decreased high-level IADLs  Assessment: pt requiring mod encouragement to be up OOB for activity other than toileting and performing toileting with set up only &  min assist with stand pivot transfers; continues to benefit from skilled OT services  Activity Tolerance  Activity Tolerance: Patient Tolerated treatment well  Activity Tolerance: sitting in chair after toileting on BSC: BP = 114/75, 97 % O 2 sats on RA, HR = 85  Safety Devices  Safety Devices in place: Yes  Type of devices: Call light within reach; Left in chair;Nurse notified         Patient Diagnosis(es): The primary encounter diagnosis was Intra-abdominal abscess ([facility] [address] Diagnoses of Acute diverticulitis, Generalized weakness, and Noncompliance with medication regimen were also pertinent to this visit. has a past medical history of Acute hemorrhoid, Depression, Elevated blood pressure reading in office with white coat syndrome, without diagnosis of hypertension, and Osteoarthritis. has a past surgical history that includes Tubal ligation; Breast enhancement surgery (); CT PERITONEAL/RETROPERITONEAL PERC DRAIN (2021); and Small intestine surgery (N/A, 2021).     Restrictions  Restrictions/Precautions  Restrictions/Precautions: Fall Risk, Up as Tolerated, General Precautions  Required Braces or Orthoses?: No  Position Activity Restriction  Other position/activity restrictions: RUPAL,  IV ,   abdominal binder  Subjective   General Chart Reviewed: Yes  Patient assessed for rehabilitation services?: Yes  Family / Caregiver Present: No  Referring Practitioner: D John  Diagnosis: abscess of sigmoid colon  Subjective  Subjective: \" I am strengthening my arms\" as pt holding  self off Bedside commode seat  General Comment  Comments: RN cleared pt for OT; pt resting in bed, agreeable to get up OOB with OT  Pain Assessment  Pain Assessment: 0-10  Pain Level: 6  Pain Type: Acute pain;Surgical pain  Pain Location: Abdomen  Functional Pain Assessment: Prevents or interferes some active activities and ADLs  Non-Pharmaceutical Pain Intervention(s): Therapeutic presence;Repositioned; Ambulation/Increased Activity  Pre Treatment Pain Screening  Intervention List: Patient able to continue with treatment  Vital Signs  Patient Currently in Pain: Yes   Orientation  Orientation  Overall Orientation Status: Within Functional Limits  Objective    ADL  Feeding: Independent  Grooming: Setup(seated in chair to wash hands after toileting)  Toileting: Setup(with supplies, independent with toileting hygiene)        Balance  Sitting Balance: Supervision  Standing Balance: Minimal assistance  Standing Balance  Activity: OOB to [facility]-->chair  Toilet Transfers  Toilet - Technique: Stand pivot  Equipment Used: Extra wide bedside commode  Toilet Transfer: Contact guard assistance  Bed mobility  Supine to Sit: Supervision  Sit to Supine: Unable to assess(Left up in chair for lunch, pt agreeable)  Scooting: Modified independent  Transfers  Stand Pivot Transfers: Minimal assistance  Sit to stand: Contact guard assistance  Stand to sit: Contact guard assistance                       Cognition  Overall Cognitive Status: Exceptions  Arousal/Alertness: Appropriate responses to stimuli  Following Commands:  Follows one step commands with increased time  Attention Span: Attends with cues to redirect  Memory: Appears intact  Safety Judgement: Decreased awareness of need for safety

## 2021-02-26 NOTE — PLAN OF CARE
Problem: Falls - Risk of:  Goal: Will remain free from falls  Description: Will remain free from falls  2/26/2021 0123 by Buffy Black RN  Outcome: Ongoing  2/25/2021 1203 by Gardenia Pederson RN  Outcome: Ongoing     Problem: Falls - Risk of:  Goal: Absence of physical injury  Description: Absence of physical injury  2/26/2021 0123 by Buffy Black RN  Outcome: Ongoing     Problem: Falls - Risk of:  Goal: Absence of physical injury  Description: Absence of physical injury  2/26/2021 0123 by Buffy Black RN  Outcome: Ongoing  2/25/2021 1203 by Gardenia Pederson RN  Outcome: Ongoing

## 2021-02-26 NOTE — PROGRESS NOTES
Shift assessment completed. Pt A&Ox3, VSS. Pt C/O 7/10 abdominal pain, PRN pain medication given per MAR. Pt has a midline incision with staples, open to air, and a right RUPAL drain. Pt requires a lot of encouragement to stand on her own, but is able with assistance. Bed locked and in lowest position. Call light & bedside table are within reach.  Bed alarm in place for safety.

## 2021-02-26 NOTE — CARE COORDINATION
Chart reviewed day 11. Spoke with Kd Phan. Reported patient still getting IV and po pain meds. Still on full liquid diet. Plan for [facility] when medically ready.  Will need rapid covid at d/c. Anita Langley RN

## 2021-02-26 NOTE — PROGRESS NOTES
Food/Nutrient Intake Outcomes:  Food and Nutrient Intake, Supplement Intake  Physical Signs/Symptoms Outcomes:  GI Status     Discharge Planning:    Continue current diet     Electronically signed by Chyrel Gilford.  Duane Zabala RD, LD on 2/26/21 at 1:21 PM EST    Contact: [phone]

## 2021-02-27 VITALS
SYSTOLIC BLOOD PRESSURE: 148 MMHG | RESPIRATION RATE: 20 BRPM | OXYGEN SATURATION: 95 % | HEIGHT: 65 IN | BODY MASS INDEX: 21.91 KG/M2 | WEIGHT: 131.5 LBS | DIASTOLIC BLOOD PRESSURE: 73 MMHG | TEMPERATURE: 97.7 F | HEART RATE: 96 BPM

## 2021-02-27 LAB
GLUCOSE BLD-MCNC: 103 MG/DL (ref 70–99)
GLUCOSE BLD-MCNC: 110 MG/DL (ref 70–99)
GLUCOSE BLD-MCNC: 88 MG/DL (ref 70–99)
GLUCOSE BLD-MCNC: 91 MG/DL (ref 70–99)
GLUCOSE BLD-MCNC: 94 MG/DL (ref 70–99)
PERFORMED ON: ABNORMAL
PERFORMED ON: ABNORMAL
PERFORMED ON: NORMAL
SARS-COV-2, NAAT: NOT DETECTED

## 2021-02-27 PROCEDURE — 6360000002 HC RX W HCPCS: Performed by: CLINICAL NURSE SPECIALIST

## 2021-02-27 PROCEDURE — 6370000000 HC RX 637 (ALT 250 FOR IP): Performed by: SURGERY

## 2021-02-27 PROCEDURE — 6360000002 HC RX W HCPCS: Performed by: SURGERY

## 2021-02-27 PROCEDURE — 2500000003 HC RX 250 WO HCPCS: Performed by: CLINICAL NURSE SPECIALIST

## 2021-02-27 PROCEDURE — 99024 POSTOP FOLLOW-UP VISIT: CPT | Performed by: SURGERY

## 2021-02-27 PROCEDURE — 6370000000 HC RX 637 (ALT 250 FOR IP): Performed by: CLINICAL NURSE SPECIALIST

## 2021-02-27 PROCEDURE — 87635 SARS-COV-2 COVID-19 AMP PRB: CPT

## 2021-02-27 RX ORDER — OXYCODONE HYDROCHLORIDE 5 MG/1
5 TABLET ORAL EVERY 4 HOURS PRN
Qty: 20 TABLET | Refills: 0
Start: 2021-02-27 | End: 2021-02-27

## 2021-02-27 RX ORDER — OXYCODONE HYDROCHLORIDE 5 MG/1
5 TABLET ORAL EVERY 4 HOURS PRN
Qty: 20 TABLET | Refills: 0 | Status: SHIPPED | OUTPATIENT
Start: 2021-02-27 | End: 2021-03-02

## 2021-02-27 RX ORDER — AMOXICILLIN AND CLAVULANATE POTASSIUM 875; 125 MG/1; MG/1
1 TABLET, FILM COATED ORAL EVERY 12 HOURS SCHEDULED
Qty: 14 TABLET | Refills: 0
Start: 2021-02-27 | End: 2021-03-06

## 2021-02-27 RX ORDER — OXYCODONE HYDROCHLORIDE 5 MG/1
10 TABLET ORAL EVERY 4 HOURS PRN
Status: DISCONTINUED | OUTPATIENT
Start: 2021-02-27 | End: 2021-02-27 | Stop reason: HOSPADM

## 2021-02-27 RX ORDER — OXYCODONE HYDROCHLORIDE 5 MG/1
5 TABLET ORAL EVERY 4 HOURS PRN
Status: DISCONTINUED | OUTPATIENT
Start: 2021-02-27 | End: 2021-02-27 | Stop reason: HOSPADM

## 2021-02-27 RX ADMIN — OXYCODONE 5 MG: 5 TABLET ORAL at 08:33

## 2021-02-27 RX ADMIN — POTASSIUM CHLORIDE 10 MEQ: 750 TABLET, EXTENDED RELEASE ORAL at 10:23

## 2021-02-27 RX ADMIN — POTASSIUM CHLORIDE 10 MEQ: 750 TABLET, EXTENDED RELEASE ORAL at 14:47

## 2021-02-27 RX ADMIN — FAMOTIDINE 20 MG: 10 INJECTION, SOLUTION INTRAVENOUS at 08:33

## 2021-02-27 RX ADMIN — OXYCODONE 5 MG: 5 TABLET ORAL at 12:39

## 2021-02-27 RX ADMIN — SIMETHICONE 80 MG: 80 TABLET, CHEWABLE ORAL at 08:33

## 2021-02-27 RX ADMIN — HYDROMORPHONE HYDROCHLORIDE 0.5 MG: 2 INJECTION, SOLUTION INTRAMUSCULAR; INTRAVENOUS; SUBCUTANEOUS at 10:21

## 2021-02-27 RX ADMIN — AMOXICILLIN AND CLAVULANATE POTASSIUM 1 TABLET: 875; 125 TABLET, FILM COATED ORAL at 10:23

## 2021-02-27 RX ADMIN — OXYCODONE 5 MG: 5 TABLET ORAL at 16:31

## 2021-02-27 RX ADMIN — ONDANSETRON 4 MG: 2 INJECTION INTRAMUSCULAR; INTRAVENOUS at 14:48

## 2021-02-27 RX ADMIN — HYDROMORPHONE HYDROCHLORIDE 0.5 MG: 2 INJECTION, SOLUTION INTRAMUSCULAR; INTRAVENOUS; SUBCUTANEOUS at 06:01

## 2021-02-27 ASSESSMENT — PAIN SCALES - GENERAL
PAINLEVEL_OUTOF10: 5
PAINLEVEL_OUTOF10: 7
PAINLEVEL_OUTOF10: 7
PAINLEVEL_OUTOF10: 5

## 2021-02-27 ASSESSMENT — PAIN DESCRIPTION - LOCATION: LOCATION: ABDOMEN

## 2021-02-27 ASSESSMENT — PAIN DESCRIPTION - PAIN TYPE: TYPE: ACUTE PAIN;SURGICAL PAIN

## 2021-02-27 NOTE — PROGRESS NOTES
Pt assessment completed and charted. VSS. Pt a/o. Midline incision LOVE is CDI. RUPAL compressed to bulb suction. Encouraged pt to turn, pt refused. Bed alarm on for safety. PRN medication given per mar. Bed in lowest position and wheels locked. Call light within reach. Bedside table within reach. Non-skid footwear in place. Pt denies any other needs at this time. Pt calls out appropriately. Will continue to monitor.

## 2021-02-27 NOTE — PROGRESS NOTES
[facility] GENERAL SURGERY    Surgery Progress Note           POD # 10    PATIENT NAME: Jan Potter     TODAY'S DATE: 2/27/2021    INTERVAL HISTORY:    Pt having bms. OBJECTIVE:   VITALS:  /66   Pulse 82   Temp 97.6 °F (36.4 °C) (Oral)   Resp 20   Ht 5' 5\" (1.651 m)   Wt 131 lb 8 oz (59.6 kg)   SpO2 97%   BMI 21.88 kg/m²     INTAKE/OUTPUT:    I/O last 3 completed shifts: In: 61 [P.O.:50; I.V.:10]  Out: 1895 [Urine:1850; Drains:45]  I/O this shift:  In: 150 [P.O.:150]  Out: 21  [Urine:800; Drains:30]              CONSTITUTIONAL:  awake and alert  LUNGS:  no crackles or wheezing  ABDOMEN:   normal bowel sounds, soft, non-distended, tender, keegan serous   INCISION: clean    Data:  CBC: No results for input(s): WBC, HGB, HCT, PLT in the last 72 hours. BMP:    Recent Labs     02/25/21  0519 02/26/21  1052    141   K 3.1* 3.0*    106   CO2 28 27   BUN <2* <2*   CREATININE <0.5* <0.5*   GLUCOSE 98 119*     Hepatic: No results for input(s): AST, ALT, ALB, BILITOT, ALKPHOS in the last 72 hours. Mag:      Recent Labs     02/25/21  0519   MG 2.20      Phos:   No results for input(s): PHOS in the last 72 hours. INR: No results for input(s): INR in the last 72 hours. Radiology Review:  NA    ASSESSMENT AND PLAN:  72 y.o. female status post sigmoid colectomy  1.  GI function returned and tolerating low fiber diet  2.  keegan removed  3.   D/c to snf today         Electronically signed by Shanice Mcdonald MD

## 2021-02-27 NOTE — CARE COORDINATION
CASE MANAGEMENT DISCHARGE SUMMARY      Discharge to: SNF @ [address] [place] [address] completed: [facility] Exemption Notification (HENS) completed: yes    New Durable Medical Equipment ordered/agency: per facility    Transportation:    Family/car: yes   Medical Transport explained to pt/family. Pt/family voice no agency preference. Agency used: [facility] up time: 1600   Ambulance form completed: Yes    Confirmed discharge plan with:     Patient: yes per RN     Family, name and contact number: dtr April [phone]   Facility/Agency, name: [facility] fax[facility]   Phone number for report to facility: [phone]     RN, name: Dianne MORRISON    Note: Discharging nurse to complete CARO, reconcile AVS, and place final copy with patient's discharge packet. RN to ensure that written prescriptions for  Level II medications are sent with patient to the facility as per protocol.

## 2021-03-02 NOTE — DISCHARGE SUMMARY
Surgery Discharge Summary    Patient Identification  Betty Mosqueda is a 72 y.o. female. :  1950  Admit Date:  2/15/2021    Discharge date:   2021  4:55 PM                                   Disposition: SNF    Discharge Diagnoses: Active Problems:    Abscess of sigmoid colon    Intra-abdominal abscess (HCC)  Resolved Problems:    * No resolved hospital problems. *      Discharge condition: good    Discharge Medications:     Discharge Medication List as of 2021  4:39 PM             Discharge Medication List as of 2021  4:39 PM      START taking these medications    Details   amoxicillin-clavulanate (AUGMENTIN) 875-125 MG per tablet Take 1 tablet by mouth every 12 hours for 7 days, Disp-14 tablet, R-0NO PRINT               Most Recent Labs:    CBC: No results for input(s): WBC, HGB, HCT, PLT in the last 72 hours. BMP:  No results for input(s): NA, K, CL, CO2, BUN, CREATININE, GLUCOSE in the last 72 hours. Hepatic: No results for input(s): AST, ALT, ALB, BILITOT, ALKPHOS in the last 72 hours. PT/INR:  No results for input(s): INR in the last 72 hours. Consults: none    Surgery: sigmoid colectomy    Patient Instructions: Activity: no heavy lifting, pushing, pulling for 4 weeks, no driving for2 weeks or while on analgesics  Diet: low fiber  Follow-up with John in 2 weeks. The patient and/or family/patient representatives, were provided education regarding discharge instructions, ongoing treatment and follow-up. Details of information given to the patient may be found in the discharge instructions located in the EMR. HPI and Hospital Course:   Patient admitted after above operation. As gi function returned had diet slowly advanced. Worked with PT/OT and recommended SNF.  keegan removed prior to discharge.       [facility] Surgery

## 2021-03-12 ENCOUNTER — OFFICE VISIT (OUTPATIENT)
Dept: SURGERY | Age: 71
End: 2021-03-12

## 2021-03-12 VITALS
BODY MASS INDEX: 21.88 KG/M2 | TEMPERATURE: 97 F | HEIGHT: 65 IN | HEART RATE: 86 BPM | DIASTOLIC BLOOD PRESSURE: 77 MMHG | SYSTOLIC BLOOD PRESSURE: 144 MMHG

## 2021-03-12 DIAGNOSIS — Z09 POSTOP CHECK: Primary | ICD-10-CM

## 2021-03-12 PROCEDURE — 99024 POSTOP FOLLOW-UP VISIT: CPT | Performed by: SURGERY

## 2021-03-12 NOTE — PATIENT INSTRUCTIONS
· Discussed monitoring her bowel function and using laxatives/stool softeners to maintain normal pattern.   Continue with routine wound care as discussed  Gradually increase activities as tolerated  Follow-up as needed; please call with questions or concerns

## 2021-03-12 NOTE — PROGRESS NOTES
Surgery Post-op Progress Note    HPI:  Notes reviewed, and agree with documentation in pt's chart. Postoperative Follow-up: Patient presents for 3 week follow-up status post urgent sigmoidectomy for focal severe sigmoid diverticulitis. Uncomplicated postop hospital course, has been at [facility] for rehab for past 2 weeks, going to her daughter's home today . Eating a regular diet without difficulty. Bowel movements are Normal.  The patient is not having any pain. .     ROS:    · 10 point review of systems performed; please refer to HPI with pertinent positives, all other ROS are negative    PE:   CONSTITUTIONAL:  awake and alert    ABDOMEN: soft, non-distended, non-tender     INCISION: clean, dry, no drainage, healing, staples intact      ASSESSMENT:   Diagnosis Orders   1. Postop check     ·       PLAN:    · Staples removed without incident  · Discussed monitoring her bowel function and using laxatives/stool softeners to maintain normal pattern.   Continue with routine wound care as discussed  Gradually increase activities as tolerated  Follow-up as needed; please call with questions or concerns  ·

## 2021-03-15 ENCOUNTER — CARE COORDINATION (OUTPATIENT)
Dept: CASE MANAGEMENT | Age: 71
End: 2021-03-15

## 2021-03-15 NOTE — CARE COORDINATION
[facility] Transitions Initial Follow Up Call    Call within 2 business days of discharge: Yes    Patient: Bernardo Lester Patient : 1950   MRN: [id number]  Reason for Admission: Intra-abdominal abscess  Discharge Date: 21 RARS: Readmission Risk Score: 14      Last Discharge [facility]       Complaint Diagnosis Description Type Department Provider    2/15/21 Abdominal Pain Intra-abdominal abscess ([address] . .. ED to Hosp-Admission (Discharged) (ADMITTED) Anthony Hunt MD; Khadijah... Acute Care Course: Patient admitted to [facility] for a Perforation of Sigmoid Colon from -21. Patient discharged to home. Patient re-admitted to [facility] 2/15-/21 for Abscess of sigmoid colon. She discharged to [facility] [place] -3/12/21. DME: Rollator    Conversation: Patient states she is in the process of relocating from [address] [place] [address] to [place] with her daughter. States her daughter will get a her a new PCP and set up [place] [address]. Patient prefer her daughter talk with caller. Daughter April states patient can ambulate with her rollator 20-30 feet. States she is using a heating pad for abdominal comfort. Patient continues to speak in the background. April repeats that there is pain with bowel movements but they are regular. Patient eats small meals, drinks juice and ice chips. Patient states she is also performing her exercises. No med review d/t patient not taking any. Caller provided education of the process of obtaining a new PCP and relationship to following for [facility]. Daughter states she will get patient an appointment with her PCP. Follow up plan: Will follow up on status of PCP.       Non-face-to-face services provided:      Care Transitions 24 Hour Call    Do you have a copy of your discharge instructions?: Yes  Do you have all of your prescriptions and are they filled?:  (Comment: Patient does not have any medications prescribed)  Have you been contacted by a [address]?: No  Have you scheduled your follow up appointment?: No (Comment: Patient is relocating [email or web address])  Were you discharged with any Home Care or Post Acute Services: Yes  Post Acute Services:  (Comment: Patient relocating so [facility] AT [facility] referral invalid)  Care Transitions Interventions         Follow Up  No future appointments.     Ana Jane RN

## 2021-03-16 ENCOUNTER — TELEPHONE (OUTPATIENT)
Dept: FAMILY MEDICINE CLINIC | Age: 71
End: 2021-03-16

## 2021-03-16 NOTE — TELEPHONE ENCOUNTER
----- Message from [facility] sent at 3/15/2021  6:56 PM EDT -----  Subject: Appointment Request    Reason for Call: Routine Hospital Follow Up    QUESTIONS  Type of Appointment? Established Patient  Reason for appointment request? No appointments available during search  Additional Information for Provider? pt was discharged from nursing home   for a couple days and needs a appointment for home health. she would   prefer a VV or a phone conservation if possible   ---------------------------------------------------------------------------  --------------  [address]  What is the best way for the office to contact you? OK to leave message on   voicemail  Preferred Call Back Phone Number? [phone]  ---------------------------------------------------------------------------  --------------  SCRIPT ANSWERS  Relationship to Patient? Self  Appointment reason? Well Care/Follow Ups  Select a Well Care/Follow Ups appointment reason? Adult Hospital Follow Up   [facility] Discharge]  (Patient requests to see provider urgently. )? No  (Has the patient been discharged from the hospital within 2 business days   AND does not have a Telephone Encounter  Follow Up From [address] [place] [address]   documented in [address]?)? No  Do you have any questions for your primary care provider that need to be   answered prior to your appointment? (Use RN Triage if question pertains to   anything on the red flag list)? No  (Patient needs follow up visit after hospital discharge) Book first   available appointment within 7 days OF DISCHARGE   if no appt   proceed to book the next available time slot within 14 days OF DISCHARGE   AND Send Message to Provider. [facility] Follow Up appointment cannot be   booked beyond 14 Days and should result in a Message to Provider. ? Yes   Have you been diagnosed with   tested for   or told that you are suspected of having COVID-19 (Coronavirus)?  No  Have you had a fever or taken medication to treat a fever within the past   3 days? No  Have you had a cough   shortness of breath or flu-like symptoms within the past 3 days? No  Do you currently have flu-like symptoms including fever or chills   cough   shortness of breath   or difficulty breathing   or new loss of taste or smell? No  (Service Expert  click yes below to proceed with [employer] As Usual   Scheduling)?  Yes

## 2021-03-17 ENCOUNTER — VIRTUAL VISIT (OUTPATIENT)
Dept: FAMILY MEDICINE CLINIC | Age: 71
End: 2021-03-17
Payer: MEDICARE

## 2021-03-17 ENCOUNTER — CARE COORDINATION (OUTPATIENT)
Dept: CASE MANAGEMENT | Age: 71
End: 2021-03-17

## 2021-03-17 DIAGNOSIS — K65.1 INTRA-ABDOMINAL ABSCESS (HCC): ICD-10-CM

## 2021-03-17 DIAGNOSIS — K63.1 PERFORATION OF SIGMOID COLON (HCC): Primary | ICD-10-CM

## 2021-03-17 PROCEDURE — G2012 BRIEF CHECK IN BY MD/QHP: HCPCS | Performed by: STUDENT IN AN ORGANIZED HEALTH CARE EDUCATION/TRAINING PROGRAM

## 2021-03-17 ASSESSMENT — PATIENT HEALTH QUESTIONNAIRE - PHQ9
1. LITTLE INTEREST OR PLEASURE IN DOING THINGS: 0
2. FEELING DOWN, DEPRESSED OR HOPELESS: 0
SUM OF ALL RESPONSES TO PHQ9 QUESTIONS 1 & 2: 0
SUM OF ALL RESPONSES TO PHQ QUESTIONS 1-9: 0

## 2021-03-17 NOTE — CARE COORDINATION
[facility] Transitions Follow Up Call    3/17/2021    Patient: Bernardo Lester  Patient : 1950   MRN: [id number]  Reason for Admission: Intra-abdominal abscess   Discharge Date: 21 RARS: Readmission Risk Score: 14    Acute Care Course:  Patient admitted to [facility] for a Perforation of Sigmoid Colon from -21. Patient discharged to home. Patient re-admitted to [facility] 2/15-/21 for Abscess of sigmoid colon. She discharged to [place] -3/12/21.       Sig Hx:     DME:   Rollator  Conversation:  Patient reports she is doing ok. She had a 2 hour ride back home yesterday. She slept well. Appetite and fluid intake pretty good. Bladder and bowel elimination good. Had to take a stool softener to help. Patient is expecting a call from her PCP office today. She will get the order for hhc with this call. Will follow up. Follow up plan:  Status of hhc and PCP         Care Transitions Subsequent and Final Call    Subsequent and Final Calls  Care Transitions Interventions  Other Interventions:            Follow Up  Future Appointments   Date Time Provider Heena Newman   3/17/2021 10:30 AM DO PATRICK Orantes LPN

## 2021-03-17 NOTE — PROGRESS NOTES
Kasi Blanco (:  1950) is a 72 y.o. female,Established patient, here for evaluation of the following chief complaint(s): Other (discuss discharge from nursing home as well as home health care and Physical Therapy)      ASSESSMENT/PLAN:  1. Perforation of sigmoid colon ([facility] [address]  2. Intra-abdominal abscess [facility])  Discussed with patient and daughter that I would be willing to accept care while Dr. Laura Osorio is out on Maternity leave. Recommended that they call prior referral placed by carlos alberto to set this up. No follow-ups on file. SUBJECTIVE/OBJECTIVE:  HPI    Per care coordination  Patient admitted to [facility] for a Perforation of Sigmoid Colon from -21. Patient discharged to home. Patient re-admitted to [facility] 2/15-/21 for Abscess of sigmoid colon. She discharged to [place] -3/12/21. Carlos Alberto recommended home health care and PT. Placed referral but needed physician to accept care. Contacts and numbers below. Emeka Folwer [phone]  [address] [facility] [phone]    Review of Systems    Patient-Reported Vitals 3/17/2021   Patient-Reported Weight 131   Patient-Reported Height 5'5\"   Patient-Reported Systolic 966   Patient-Reported Diastolic 65   Patient-Reported Pulse 97   Patient-Reported Temperature -        Physical Exam  Telephone call      On this date 3/17/2021 I have spent 15 minutes reviewing previous notes, test results and face to face (virtual) with the patient discussing the diagnosis and importance of compliance with the treatment plan as well as documenting on the day of the visit. Kasi Blanco, was evaluated through a synchronous (real-time) audio-video encounter. The patient (or guardian if applicable) is aware that this is a billable service. Verbal consent to proceed has been obtained within the past 12 months.  The visit was conducted pursuant to the emergency declaration under the [address]

## 2021-03-18 ENCOUNTER — TELEPHONE (OUTPATIENT)
Dept: FAMILY MEDICINE CLINIC | Age: 71
End: 2021-03-18

## 2021-03-18 NOTE — TELEPHONE ENCOUNTER
[facility] did evaluation on pt today. She is asking if Dr. Karoline Mcintyre will sign orders/follow patient.   She is asking for a verbal.

## 2021-03-19 ENCOUNTER — CARE COORDINATION (OUTPATIENT)
Dept: CASE MANAGEMENT | Age: 71
End: 2021-03-19

## 2021-03-19 NOTE — CARE COORDINATION
[facility] Transitions Follow Up Call    3/19/2021    Patient: Anna Marie Souza  Patient : 1950   MRN: [id number]  Reason for Admission: Intra-abdominal abscess   Discharge Date: 21 RARS: Readmission Risk Score: 14    Acute Care Course:  Patient admitted to [facility] for a Perforation of Sigmoid Colon from -21. Patient discharged to home. Patient re-admitted to [facility] 2/15-/21 for Abscess of sigmoid colon. She discharged to [place] -3/12/21. Sig Hx:       DME:   Rollator  Conversation:  Patient reports she is doing ok. She had her appointment with her PCP. She also had a nurse come from [facility] come. She is waiting to hear from PT. She continues to use rollator for ambulation. Appetite and fluid intake good. Bladder and bowel elimination good. Patient doesn't take any medication. Will follow up. Follow up plan:  Status of PT    Care Transitions Subsequent and Final Call    Subsequent and Final Calls  Care Transitions Interventions  Other Interventions: Follow Up  No future appointments.     Sandro Almanzar LPN

## 2021-03-24 ENCOUNTER — CARE COORDINATION (OUTPATIENT)
Dept: CASE MANAGEMENT | Age: 71
End: 2021-03-24

## 2021-03-24 NOTE — CARE COORDINATION
[facility] Transitions Follow Up Call    3/24/2021    Patient: Raina Sharp  Patient : 1950   MRN: [id number]  Reason for Admission: Intra-abdominal abscess  Discharge Date: 21 RARS: Readmission Risk Score: 14    Acute Care Course:  Patient admitted to [facility] for a Perforation of Sigmoid Colon from -21. Patient discharged to home. Patient re-admitted to [facility] 2/15-/21 for Abscess of sigmoid colon. She discharged to [place] -3/12/21.       Sig Hx:     DME:   Rollator  Conversation:  Patient reports she is doing good. Appetite and fluid intake good. Bladder and bowel elimination regular. Patient denies pain or discomfort. Had virtual visit with PCP. PT is due to come tomorrow. She has PT 2 x a week. Patient uses a rollator for ambulation. She has a bath aide. She no longer plans to move to [place]. Patient informed this is the final call we will make. Contact information provided. No further outreach scheduled. Follow up plan:         Care Transitions Subsequent and Final Call    Subsequent and Final Calls  Care Transitions Interventions  Other Interventions: Follow Up  No future appointments.     Dashawn Medina LPN

## 2021-03-31 ENCOUNTER — TELEPHONE (OUTPATIENT)
Dept: FAMILY MEDICINE CLINIC | Age: 71
End: 2021-03-31

## 2021-03-31 NOTE — TELEPHONE ENCOUNTER
Alysia asking for verbal orders for OT 1x week x 1 week and then 2x week  2 weeks.      Cyndi Isabel:  [phone]

## 2021-10-29 ENCOUNTER — TELEPHONE (OUTPATIENT)
Dept: FAMILY MEDICINE CLINIC | Age: 71
End: 2021-10-29

## 2021-10-29 NOTE — TELEPHONE ENCOUNTER
Pt was called and reminded regarding walk in Mammo and that Mammo screening is due for this patient. Pt phone number is no longer activated.   Will send pt message via [employer]

## 2022-06-01 ENCOUNTER — APPOINTMENT (OUTPATIENT)
Dept: CT IMAGING | Age: 72
End: 2022-06-01
Payer: MEDICARE

## 2022-06-01 ENCOUNTER — HOSPITAL ENCOUNTER (EMERGENCY)
Age: 72
Discharge: HOME | End: 2022-06-01
Attending: EMERGENCY MEDICINE
Payer: MEDICARE

## 2022-06-01 VITALS
BODY MASS INDEX: 26.66 KG/M2 | TEMPERATURE: 98.6 F | SYSTOLIC BLOOD PRESSURE: 146 MMHG | RESPIRATION RATE: 21 BRPM | HEIGHT: 65 IN | OXYGEN SATURATION: 88 % | WEIGHT: 160 LBS | DIASTOLIC BLOOD PRESSURE: 66 MMHG | HEART RATE: 91 BPM

## 2022-06-01 DIAGNOSIS — M54.12 CERVICAL RADICULOPATHY: Primary | ICD-10-CM

## 2022-06-01 PROCEDURE — 99284 EMERGENCY DEPT VISIT MOD MDM: CPT

## 2022-06-01 PROCEDURE — 72125 CT NECK SPINE W/O DYE: CPT

## 2022-06-01 RX ORDER — ACETAMINOPHEN 325 MG/1
650 TABLET ORAL ONCE
Status: DISCONTINUED | OUTPATIENT
Start: 2022-06-01 | End: 2022-06-01 | Stop reason: HOSPADM

## 2022-06-01 ASSESSMENT — PAIN - FUNCTIONAL ASSESSMENT
PAIN_FUNCTIONAL_ASSESSMENT: NONE - DENIES PAIN
PAIN_FUNCTIONAL_ASSESSMENT: 0-10

## 2022-06-01 ASSESSMENT — PAIN DESCRIPTION - PAIN TYPE: TYPE: ACUTE PAIN

## 2022-06-01 ASSESSMENT — PAIN DESCRIPTION - LOCATION: LOCATION: ARM;NECK

## 2022-06-01 NOTE — ED PROVIDER NOTES
[facility]  ED  EMERGENCY DEPARTMENT ENCOUNTER      Pt Name: Do Sabillon  MRN: [id number]  [place] 1950  Date of evaluation: 6/1/2022  Provider: Lester Redd MD    CHIEF COMPLAINT       Chief Complaint   Patient presents with    Neck Pain     pt reporting she turned her neck to the right and felt a pop in her neck then she \"reached\" for the thermostat with her left arm and felt pain in her bicep. incident happened x 2 weeks ago.  Arm Pain         HISTORY OF PRESENT ILLNESS   (Location/Symptom, Timing/Onset, Context/Setting, Quality, Duration, Modifying Factors, Severity)  Note limiting factors. Do Sabillon is a 80 y.o. female with past medical history of hyperlipidemia here today with neck and arm pain. The patient states that over a week ago she turned her head and had a popping sensation in her neck. She is had some soreness and aching discomfort primarily in the left side of the neck since that time but notes that a few days later she was raising her left arm above her head when she had the cute onset of sharp stabbing pain radiating from the left neck down into the left trapezius and shoulder region. No direct trauma or injury. No headache. No numbness, tingling or weakness in the upper extremity with the patient states she has some limited range of motion secondary to discomfort. No chest pain or shortness of breath. Pain is sharp in nature. Moderate. HPI    Nursing Notes were reviewed. REVIEW OF SYSTEMS    (2-9 systems for level 4, 10 or more for level 5)     Review of Systems    Please see HPI for pertinent positive and negative review of system findings. A full 10 system ROS was performed and otherwise negative.         PAST MEDICAL HISTORY     Past Medical History:   Diagnosis Date    Acute hemorrhoid     Depression     Elevated blood pressure reading in office with white coat syndrome, without diagnosis of hypertension     Osteoarthritis SURGICAL HISTORY       Past Surgical History:   Procedure Laterality Date    BREAST ENHANCEMENT SURGERY  5455    silicon injected    CT RETROPERITONEAL PERC DRAIN  2021    CT RETROPERITONEAL PERC DRAIN 2021 Bolivar Bernardo MD [facility] [place] CT SCAN    SMALL INTESTINE SURGERY N/A 2021    EXPLORATORY LAPAROTOMY, SIGMOIDECTOMY WITH ANASTOMOSIS performed by Cheo Escalera MD at [address] [place] [address].       There are no discharge medications for this patient. ALLERGIES     Codeine and Morphine    FAMILY HISTORY       Family History   Problem Relation Age of Onset    Cancer Mother           SOCIAL HISTORY       Social History     Socioeconomic History    Marital status:      Spouse name: None    Number of children: 2    Years of education: None    Highest education level: None   Occupational History    Occupation: insurance     Comment: sales, former   Tobacco Use    Smoking status: Former Smoker     Packs/day: 1.00     Years: 10.00     Pack years: 10.00     Types: Cigarettes     Quit date:      Years since quittin.4    Smokeless tobacco: Never Used   Vaping Use    Vaping Use: Never used   Substance and Sexual Activity    Alcohol use: Not Currently    Drug use: No    Sexual activity: Never   Other Topics Concern    None   Social History Narrative    None     Social Determinants of Health     Financial Resource Strain:     Difficulty of Paying Living Expenses: Not on file   Food Insecurity:     Worried About Running Out of Food in the Last Year: Not on file    Hank of Food in the Last Year: Not on file   Transportation Needs:     Lack of Transportation (Medical): Not on file    Lack of Transportation (Non-Medical):  Not on file   Physical Activity:     Days of Exercise per Week: Not on file    Minutes of Exercise per Session: Not on file   Stress:     Feeling of Stress : Not on file   Social Connections:     Frequency of Communication with Friends and Family: Not on file    Frequency of Social Gatherings with Friends and Family: Not on file    Attends Denominational Services: Not on file    Active Member of Clubs or Organizations: Not on file    Attends Club or Organization Meetings: Not on file    Marital Status: Not on file   Intimate Partner Violence:     Fear of Current or Ex-Partner: Not on file    Emotionally Abused: Not on file    Physically Abused: Not on file    Sexually Abused: Not on file   Housing Stability:     Unable to Pay for Housing in the Last Year: Not on file    Number of [place] in the Last Year: Not on file    Unstable Housing in the Last Year: Not on file       SCREENINGS    [place] Coma Scale  Eye Opening: Spontaneous  Best Verbal Response: Oriented  Best Motor Response: Obeys commands  [place] Coma Scale Score: 15          PHYSICAL EXAM    (up to 7 for level 4, 8 or more for level 5)     ED Triage Vitals   BP Temp Temp Source Heart Rate Resp SpO2 Height Weight   06/01/22 0919 06/01/22 0919 06/01/22 0919 06/01/22 0919 06/01/22 0919 06/01/22 0919 06/01/22 0916 06/01/22 0916   (!) 155/85 98.6 °F (37 °C) Oral 90 19 99 % 5' 5\" (1.651 m) 160 lb (72.6 kg)       Physical Exam      General appearance:  Cooperative. No acute distress. Skin:  Warm. Dry. Neck: Mild lower C-spine tenderness to palpation without gross step-off or deformity. Also tenderness to palpation to the left paraspinal musculature. Normal flexion, extension, rotation of the neck. Ears, nose, mouth and throat:  Oral mucosa moist,  Perfusion:  intact with 2+ left radial pulse  Respiratory: Respirations nonlabored. Neurological:  Alert. Moves all extremities spontaneously. Normal  strength in bilateral upper extremities. Normal 5 out of 5 strength with shoulder range of motion, elbow flexion extension, wrist flexion extension. 2+ bilateral bicep and tricep deep tendon reflexes.   Musculoskeletal:   Normal ROM, no deformities. Tenderness to palpation to the left trapezius region. Normal range of motion of the bilateral upper extremities throughout the shoulders, elbows and wrist.          Psychiatric:  Normal mood      DIAGNOSTIC RESULTS       Labs Reviewed - No data to display    Interpretation per the Radiologist below, if obtained/available at the time of this note:    CT CERVICAL SPINE WO CONTRAST   Final Result   No acute abnormality of the cervical spine. All other labs/imaging were within normal range or not returned as of this dictation. EMERGENCY DEPARTMENT COURSE and DIFFERENTIAL DIAGNOSIS/MDM:   Vitals:    Vitals:    06/01/22 0916 06/01/22 0919 06/01/22 1029 06/01/22 1135   BP:  (!) 155/85 (!) 146/66 (!) 146/66   Pulse:  90 91 91   Resp:  19 21 21   Temp:  98.6 °F (37 °C)     TempSrc:  Oral     SpO2:  99% 100% (!) 88%   Weight: 160 lb (72.6 kg)      Height: 5' 5\" (1.651 m)          Patient presents to the emergency department today complaining of left-sided neck pain after \"hearing a pop\". No direct trauma or injury. Some bony tenderness noted. Normal range of motion of the shoulder. No chest or cardiothoracic complaints. CT scan reviewed shows some anterolisthesis and loss of disc height with mild canal and foraminal stenosis which I feel is the likely underlying etiology to her symptoms. No gross deformity or mass. She has no weakness or sensory changes. Did not feel emergent MRI necessary and do feel that she can be discharged home. Recommend NSAIDs and muscle relaxants as necessary but the patient has refused these stating she would just take Tylenol as needed    MDM    CONSULTS     None    Critical Care:   None    REASSESSMENT          PROCEDURE     Unless otherwise noted below, none     Procedures      FINAL IMPRESSION      1.  Cervical radiculopathy            DISPOSITION/PLAN   DISPOSITION Decision To Discharge 06/01/2022 11:27:00 AM        PATIENT REFERRED TO:  Caryn Anders MD  [address]  [phone]    Schedule an appointment as soon as possible for a visit         DISCHARGE MEDICATIONS:  There are no discharge medications for this patient. Controlled Substances Monitoring:     No flowsheet data found.     (Please note that portions of this note were completed with a voice recognition program.  Efforts were made to edit the dictations but occasionally words are mis-transcribed.)    Mateusz Banks MD (electronically signed)  Attending Emergency Physician            Zeinab Leon MD  06/02/22 3762

## 2022-06-03 ENCOUNTER — CARE COORDINATION (OUTPATIENT)
Dept: CARE COORDINATION | Age: 72
End: 2022-06-03

## 2022-06-03 NOTE — CARE COORDINATION
[facility] attempted to outreach to patient. The number listed is invalid and could not leave a return message. HIPPA form outdated. [facility] did not outreach to emergency contact. [facility] will send a [employer] message. [facility] will make no further outreaches at this time.

## 2022-12-05 ENCOUNTER — TELEPHONE (OUTPATIENT)
Dept: FAMILY MEDICINE CLINIC | Age: 72
End: 2022-12-05

## 2022-12-05 NOTE — TELEPHONE ENCOUNTER
----- Message from Cherelle Alas sent at 12/5/2022  9:10 AM EST -----  Subject: Message to Provider    QUESTIONS  Information for Provider? pt is requesting for a handicap placard to be   sent in to the [facility] requesting a call back to confirm   ---------------------------------------------------------------------------  --------------  [employer] [employer]  [phone]; OK to leave message on voicemail, OK to respond with   electronic message via [employer] portal (only for patients who have   registered [employer] account)  ---------------------------------------------------------------------------  --------------  SCRIPT ANSWERS  Relationship to Patient?  Self

## 2022-12-05 NOTE — TELEPHONE ENCOUNTER
Patient states that she will have to find another provider. That is close to her in [facility]. Patient is unable to walk far distances, she states last time she came here she had no Handicap parking close to the building and she afraid this may happen again. Patient suggest a referral to someone close to [facility] or [facility].

## 2023-01-11 ENCOUNTER — TELEPHONE (OUTPATIENT)
Dept: FAMILY MEDICINE CLINIC | Age: 73
End: 2023-01-11

## 2023-01-11 NOTE — TELEPHONE ENCOUNTER
Patient called the office wanting Dr. Christa Sauer to write a script for her to have a nurse to come out to her home daily for ADL's. I explained to the patient that she would need to be seen in the office to discuss this as she was not seen in over a year. Patient states that she can't come in due to limited mobility, she can't walk up steps. I offered to help setup transportation and to meet her downstairs but the patient refused. I gave her the name of [facility] who offer at home services, but she refused. I offered to talk to [facility] and give her a call back, patient again refused. Patient states that she will go to the ER where real doctors work and let them help her. I apologized and tired to help her, but she hung up.

## 2023-01-11 NOTE — TELEPHONE ENCOUNTER
----- Message from Nikolai Luna sent at 1/11/2023 11:50 AM EST -----  Subject: Message to Provider    QUESTIONS  Information for Provider? Pt is requesting a call from the doctor, she is   trying to get a doctor to do a home health visit.   ---------------------------------------------------------------------------  --------------  Mayco Montero KBTAVOY  [phone]; OK to leave message on voicemail  ---------------------------------------------------------------------------  --------------  SCRIPT ANSWERS  Relationship to Patient?  Self

## 2023-03-07 NOTE — ED PROVIDER NOTES
Seen by SIMONE/mid-level only. [address] ED  eMERGENCY dEPARTMENT eNCOUnter        Pt Name: Gregor Ron  MRN: [id number]  [place] 1950  Date of evaluation: 7/21/2018  Provider: Tyree Flowers PA-C  PCP: Josep Adames MD      CHIEF COMPLAINT       Chief Complaint   Patient presents with    Nephrolithiasis     at [facility] yesterday. does have kidney stone. pt back because the pain is worse at home. now is pain free. HISTORY OF PRESENT ILLNESS   (Location/Symptom, Timing/Onset, Context/Setting, Quality, Duration, Modifying Factors, Severity)  Note limiting factors. Gregor Ron is a 78 y.o. female patient presenting by EMS with complaint left flank/abdominal pain. Patient states seen [facility] ER July 19, 2018 or 48 hours ago with abdominal pain complaint. Diagnosed nephrolithiasis 2 mm on the left without evidence of hydroureteronephrosis. It's in a finding of pulmonary nodule and a left renal cyst.  Patient states that her symptoms became moderately severe approximately 45 minutes before coming to the ER by EMS. States as though she feels that she has to use the bathroom and have BM. She does state she is constipated with abdominal pressures. She does state at 10 AM this point she took a laxative, senna along with Aleve and be no. She has not had sufficient results and feels as though she needs to go to the bathroom. She declines IV. She declines narcotics. She declines CT imaging. Nursing Notes were all reviewed and agreed with or any disagreements were addressed  in the HPI. REVIEW OF SYSTEMS    (2-9 systems for level 4, 10 or more for level 5)     Review of Systems    Positives and Pertinent negatives as per HPI. Except as noted above in the ROS, all other systems were reviewed and negative.        PAST MEDICAL HISTORY     Past Medical History:   Diagnosis Date    Elevated blood pressure reading in office with white coat syndrome, without
post menopausals

## 2023-04-11 NOTE — PROGRESS NOTES
[facility]    PATIENT NAME: Kasi Blanco     TODAY'S DATE: 2/4/2021    CHIEF COMPLAINT: pain at drain site    INTERVAL HISTORY/HPI:    Pt feeling better overall, only mild pain at drain insertion site. OBJECTIVE:  VITALS:  BP (!) 109/59   Pulse 74   Temp 98.1 °F (36.7 °C) (Oral)   Resp 16   Ht 5' 5\" (1.651 m)   Wt 132 lb 9.6 oz (60.1 kg)   SpO2 99%   BMI 22.07 kg/m²     INTAKE/OUTPUT:    I/O last 3 completed shifts: In: 360 [P.O.:360]  Out: 790 [Urine:775; Drains:15]  No intake/output data recorded. CONSTITUTIONAL:  awake and alert  LUNGS:     ABDOMEN:  hypoactive bowel sounds, soft, non-distended, tenderness noted in the left lower quadrant at drain site; drain with scant seropurulent fluid     Data:  CBC:   Recent Labs     02/02/21  0617 02/02/21  1436 02/03/21  0601 02/04/21  0624   WBC 6.2  --  5.3 4.2   HGB 12.0 13.1 11.8* 11.7*   HCT 36.0 39.4 35.7* 35.5*     --  178 177     BMP:    Recent Labs     02/02/21  0617 02/03/21  0601 02/04/21  0624    140 137   K 3.6 3.7 3.6    106 105   CO2 25 24 23   BUN 8 9 5*   CREATININE <0.5* <0.5* <0.5*   GLUCOSE 94 77 102*     Hepatic:   Recent Labs     02/02/21 0617 02/03/21  0601 02/04/21  0624   AST 23 15 19   ALT 37 26 23   BILITOT 0.6 0.9 0.7   ALKPHOS 148* 129 139*     Mag:    No results for input(s): MG in the last 72 hours. Phos:   No results for input(s): PHOS in the last 72 hours.    INR:   Recent Labs     02/02/21  0944   INR 1.35*       Radiology Review:         ASSESSMENT AND PLAN:  Sigmoid inflammatory process (diverticulitis?) w/ abscess s/p perc drain, patient steadily improving   - cont w/ IV abx, perc drain; likely repeat CT prior to drain removal to ensure clearance of abscess   - cont w/ clears today, likely advance diet tomorrow   - will follow    Electronically signed by Joan Morrell MD Nsaids Counseling: NSAID Counseling: I discussed with the patient that NSAIDs should be taken with food. Prolonged use of NSAIDs can result in the development of stomach ulcers.  Patient advised to stop taking NSAIDs if abdominal pain occurs.  The patient verbalized understanding of the proper use and possible adverse effects of NSAIDs.  All of the patient's questions and concerns were addressed.

## 2023-11-20 ENCOUNTER — TELEPHONE (OUTPATIENT)
Dept: FAMILY MEDICINE CLINIC | Age: 73
End: 2023-11-20

## 2023-11-20 NOTE — TELEPHONE ENCOUNTER
Called patient to inform her that since she has already cancelled 3 new patient appointments that she would need to be seen at a different office. Attempted to give patient phone number to the residency clinic and phone was disconnected.

## 2024-02-07 ENCOUNTER — HOSPITAL ENCOUNTER (OUTPATIENT)
Age: 74
Setting detail: OBSERVATION
Discharge: HOME | End: 2024-02-08
Attending: EMERGENCY MEDICINE | Admitting: INTERNAL MEDICINE
Payer: MEDICARE

## 2024-02-07 ENCOUNTER — APPOINTMENT (OUTPATIENT)
Dept: CT IMAGING | Age: 74
End: 2024-02-07
Payer: MEDICARE

## 2024-02-07 VITALS
SYSTOLIC BLOOD PRESSURE: 112 MMHG | BODY MASS INDEX: 29.99 KG/M2 | OXYGEN SATURATION: 97 % | WEIGHT: 180 LBS | HEIGHT: 65 IN | DIASTOLIC BLOOD PRESSURE: 67 MMHG | TEMPERATURE: 98.3 F | RESPIRATION RATE: 15 BRPM | HEART RATE: 73 BPM

## 2024-02-07 DIAGNOSIS — N20.1 CALCULUS OF DISTAL LEFT URETER: ICD-10-CM

## 2024-02-07 DIAGNOSIS — N10 ACUTE PYELONEPHRITIS: Primary | ICD-10-CM

## 2024-02-07 DIAGNOSIS — L98.9 CHEST SKIN LESION: ICD-10-CM

## 2024-02-07 DIAGNOSIS — R31.9 HEMATURIA, UNSPECIFIED TYPE: ICD-10-CM

## 2024-02-07 DIAGNOSIS — R11.0 NAUSEA: ICD-10-CM

## 2024-02-07 DIAGNOSIS — N94.9 ADNEXAL CYST: ICD-10-CM

## 2024-02-07 DIAGNOSIS — Z60.2 LIVES ALONE: ICD-10-CM

## 2024-02-07 PROBLEM — N20.0 KIDNEY STONE: Status: ACTIVE | Noted: 2024-02-07

## 2024-02-07 LAB
ALBUMIN SERPL-MCNC: 4.6 G/DL (ref 3.4–5)
ALBUMIN/GLOB SERPL: 2 {RATIO} (ref 1.1–2.2)
ALP SERPL-CCNC: 77 U/L (ref 40–129)
ALT SERPL-CCNC: 17 U/L (ref 10–40)
ANION GAP SERPL CALCULATED.3IONS-SCNC: 12 MMOL/L (ref 3–16)
AST SERPL-CCNC: 16 U/L (ref 15–37)
BACTERIA GENITAL QL WET PREP: NORMAL
BACTERIA URNS QL MICRO: ABNORMAL /HPF
BASOPHILS # BLD: 0 K/UL (ref 0–0.2)
BASOPHILS NFR BLD: 0.3 %
BILIRUB SERPL-MCNC: 0.9 MG/DL (ref 0–1)
BILIRUB UR QL STRIP.AUTO: NEGATIVE
BUN SERPL-MCNC: 15 MG/DL (ref 7–20)
CALCIUM SERPL-MCNC: 9.7 MG/DL (ref 8.3–10.6)
CHLORIDE SERPL-SCNC: 104 MMOL/L (ref 99–110)
CLARITY UR: ABNORMAL
CLUE CELLS SPEC QL WET PREP: NORMAL
CO2 SERPL-SCNC: 26 MMOL/L (ref 21–32)
COLOR UR: YELLOW
CREAT SERPL-MCNC: 0.7 MG/DL (ref 0.6–1.2)
DEPRECATED RDW RBC AUTO: 13.2 % (ref 12.4–15.4)
EKG ATRIAL RATE: 68 BPM
EKG DIAGNOSIS: NORMAL
EKG P AXIS: 54 DEGREES
EKG P-R INTERVAL: 162 MS
EKG Q-T INTERVAL: 448 MS
EKG QRS DURATION: 76 MS
EKG QTC CALCULATION (BAZETT): 476 MS
EKG R AXIS: 48 DEGREES
EKG T AXIS: 51 DEGREES
EKG VENTRICULAR RATE: 68 BPM
EOSINOPHIL # BLD: 0 K/UL (ref 0–0.6)
EOSINOPHIL NFR BLD: 0.5 %
EPI CELLS #/AREA URNS HPF: ABNORMAL /HPF (ref 0–5)
EPI CELLS SPEC QL WET PREP: NORMAL
GFR SERPLBLD CREATININE-BSD FMLA CKD-EPI: >60 ML/MIN/{1.73_M2}
GLUCOSE SERPL-MCNC: 111 MG/DL (ref 70–99)
GLUCOSE UR STRIP.AUTO-MCNC: NEGATIVE MG/DL
HCT VFR BLD AUTO: 47.3 % (ref 36–48)
HGB BLD-MCNC: 16.1 G/DL (ref 12–16)
HGB UR QL STRIP.AUTO: ABNORMAL
INR PPP: 1 (ref 0.84–1.16)
KETONES UR STRIP.AUTO-MCNC: NEGATIVE MG/DL
LACTATE BLDV-SCNC: 1.8 MMOL/L (ref 0.4–2)
LEUKOCYTE ESTERASE UR QL STRIP.AUTO: ABNORMAL
LIPASE SERPL-CCNC: 28 U/L (ref 13–60)
LYMPHOCYTES # BLD: 1.1 K/UL (ref 1–5.1)
LYMPHOCYTES NFR BLD: 24.4 %
MAGNESIUM SERPL-MCNC: 2.1 MG/DL (ref 1.8–2.4)
MCH RBC QN AUTO: 31.7 PG (ref 26–34)
MCHC RBC AUTO-ENTMCNC: 34 G/DL (ref 31–36)
MCV RBC AUTO: 93.3 FL (ref 80–100)
MONOCYTES # BLD: 0.2 K/UL (ref 0–1.3)
MONOCYTES NFR BLD: 4.9 %
NEUTROPHILS # BLD: 3.1 K/UL (ref 1.7–7.7)
NEUTROPHILS NFR BLD: 69.9 %
NITRITE UR QL STRIP.AUTO: NEGATIVE
PH UR STRIP.AUTO: 6.5 [PH] (ref 5–8)
PLATELET # BLD AUTO: 148 K/UL (ref 135–450)
PMV BLD AUTO: 9 FL (ref 5–10.5)
POTASSIUM SERPL-SCNC: 3.8 MMOL/L (ref 3.5–5.1)
PROT SERPL-MCNC: 6.9 G/DL (ref 6.4–8.2)
PROT UR STRIP.AUTO-MCNC: ABNORMAL MG/DL
PROTHROMBIN TIME: 13.2 SEC (ref 11.5–14.8)
RBC # BLD AUTO: 5.06 M/UL (ref 4–5.2)
RBC #/AREA URNS HPF: ABNORMAL /HPF (ref 0–4)
RBC SPEC QL WET PREP: NORMAL
RENAL EPI CELLS #/AREA UR COMP ASSIST: ABNORMAL /HPF (ref 0–1)
SODIUM SERPL-SCNC: 142 MMOL/L (ref 136–145)
SP GR UR STRIP.AUTO: 1.01 (ref 1–1.03)
SPECIMEN SOURCE FLD: NORMAL
T VAGINALIS GENITAL QL WET PREP: NORMAL
TROPONIN, HIGH SENSITIVITY: 11 NG/L (ref 0–14)
UA COMPLETE W REFLEX CULTURE PNL UR: YES
UA DIPSTICK W REFLEX MICRO PNL UR: YES
URN SPEC COLLECT METH UR: ABNORMAL
UROBILINOGEN UR STRIP-ACNC: 0.2 E.U./DL
WBC # BLD AUTO: 4.5 K/UL (ref 4–11)
WBC #/AREA URNS HPF: ABNORMAL /HPF (ref 0–5)
WBC SPEC QL WET PREP: NORMAL
YEAST GENITAL QL WET PREP: NORMAL

## 2024-02-07 PROCEDURE — 97161 PT EVAL LOW COMPLEX 20 MIN: CPT

## 2024-02-07 PROCEDURE — 96365 THER/PROPH/DIAG IV INF INIT: CPT

## 2024-02-07 PROCEDURE — 85610 PROTHROMBIN TIME: CPT

## 2024-02-07 PROCEDURE — G0378 HOSPITAL OBSERVATION PER HR: HCPCS

## 2024-02-07 PROCEDURE — 6360000004 HC RX CONTRAST MEDICATION: Performed by: EMERGENCY MEDICINE

## 2024-02-07 PROCEDURE — 99285 EMERGENCY DEPT VISIT HI MDM: CPT

## 2024-02-07 PROCEDURE — 93010 ELECTROCARDIOGRAM REPORT: CPT | Performed by: INTERNAL MEDICINE

## 2024-02-07 PROCEDURE — 74177 CT ABD & PELVIS W/CONTRAST: CPT

## 2024-02-07 PROCEDURE — 2580000003 HC RX 258: Performed by: NURSE PRACTITIONER

## 2024-02-07 PROCEDURE — 81001 URINALYSIS AUTO W/SCOPE: CPT

## 2024-02-07 PROCEDURE — 84484 ASSAY OF TROPONIN QUANT: CPT

## 2024-02-07 PROCEDURE — 83690 ASSAY OF LIPASE: CPT

## 2024-02-07 PROCEDURE — 87491 CHLMYD TRACH DNA AMP PROBE: CPT

## 2024-02-07 PROCEDURE — 83605 ASSAY OF LACTIC ACID: CPT

## 2024-02-07 PROCEDURE — 93005 ELECTROCARDIOGRAM TRACING: CPT | Performed by: EMERGENCY MEDICINE

## 2024-02-07 PROCEDURE — 97116 GAIT TRAINING THERAPY: CPT

## 2024-02-07 PROCEDURE — 6360000002 HC RX W HCPCS: Performed by: EMERGENCY MEDICINE

## 2024-02-07 PROCEDURE — 80053 COMPREHEN METABOLIC PANEL: CPT

## 2024-02-07 PROCEDURE — 87591 N.GONORRHOEAE DNA AMP PROB: CPT

## 2024-02-07 PROCEDURE — 85025 COMPLETE CBC W/AUTO DIFF WBC: CPT

## 2024-02-07 PROCEDURE — 87210 SMEAR WET MOUNT SALINE/INK: CPT

## 2024-02-07 PROCEDURE — 87086 URINE CULTURE/COLONY COUNT: CPT

## 2024-02-07 PROCEDURE — 2580000003 HC RX 258: Performed by: EMERGENCY MEDICINE

## 2024-02-07 PROCEDURE — 96361 HYDRATE IV INFUSION ADD-ON: CPT

## 2024-02-07 PROCEDURE — 83735 ASSAY OF MAGNESIUM: CPT

## 2024-02-07 RX ORDER — ACETAMINOPHEN 325 MG/1
650 TABLET ORAL EVERY 6 HOURS PRN
Status: DISCONTINUED | OUTPATIENT
Start: 2024-02-07 | End: 2024-02-08 | Stop reason: HOSPADM

## 2024-02-07 RX ORDER — BISMUTH SUBSALICYLATE 262 MG/1
524 TABLET, CHEWABLE ORAL DAILY PRN
COMMUNITY

## 2024-02-07 RX ORDER — SODIUM CHLORIDE 9 MG/ML
INJECTION, SOLUTION INTRAVENOUS CONTINUOUS
Status: ACTIVE | OUTPATIENT
Start: 2024-02-07 | End: 2024-02-08

## 2024-02-07 RX ORDER — POLYETHYLENE GLYCOL 3350 17 G/17G
17 POWDER, FOR SOLUTION ORAL DAILY PRN
Status: DISCONTINUED | OUTPATIENT
Start: 2024-02-07 | End: 2024-02-08 | Stop reason: HOSPADM

## 2024-02-07 RX ORDER — ONDANSETRON 2 MG/ML
4 INJECTION INTRAMUSCULAR; INTRAVENOUS EVERY 6 HOURS PRN
Status: DISCONTINUED | OUTPATIENT
Start: 2024-02-07 | End: 2024-02-08 | Stop reason: HOSPADM

## 2024-02-07 RX ORDER — CALCIUM CARBONATE 500 MG/1
1 TABLET, CHEWABLE ORAL DAILY PRN
COMMUNITY

## 2024-02-07 RX ORDER — SODIUM CHLORIDE 0.9 % (FLUSH) 0.9 %
5-40 SYRINGE (ML) INJECTION PRN
Status: DISCONTINUED | OUTPATIENT
Start: 2024-02-07 | End: 2024-02-08 | Stop reason: HOSPADM

## 2024-02-07 RX ORDER — POTASSIUM CHLORIDE 20 MEQ/1
40 TABLET, EXTENDED RELEASE ORAL PRN
Status: DISCONTINUED | OUTPATIENT
Start: 2024-02-07 | End: 2024-02-08 | Stop reason: HOSPADM

## 2024-02-07 RX ORDER — ACETAMINOPHEN 650 MG/1
650 SUPPOSITORY RECTAL EVERY 6 HOURS PRN
Status: DISCONTINUED | OUTPATIENT
Start: 2024-02-07 | End: 2024-02-08 | Stop reason: HOSPADM

## 2024-02-07 RX ORDER — ENOXAPARIN SODIUM 100 MG/ML
40 INJECTION SUBCUTANEOUS DAILY
Status: DISCONTINUED | OUTPATIENT
Start: 2024-02-07 | End: 2024-02-08 | Stop reason: HOSPADM

## 2024-02-07 RX ORDER — 0.9 % SODIUM CHLORIDE 0.9 %
500 INTRAVENOUS SOLUTION INTRAVENOUS ONCE
Status: COMPLETED | OUTPATIENT
Start: 2024-02-07 | End: 2024-02-07

## 2024-02-07 RX ORDER — SODIUM CHLORIDE 9 MG/ML
INJECTION, SOLUTION INTRAVENOUS PRN
Status: DISCONTINUED | OUTPATIENT
Start: 2024-02-07 | End: 2024-02-08 | Stop reason: HOSPADM

## 2024-02-07 RX ORDER — ONDANSETRON 4 MG/1
4 TABLET, ORALLY DISINTEGRATING ORAL EVERY 8 HOURS PRN
Status: DISCONTINUED | OUTPATIENT
Start: 2024-02-07 | End: 2024-02-08 | Stop reason: HOSPADM

## 2024-02-07 RX ORDER — POTASSIUM CHLORIDE 7.45 MG/ML
10 INJECTION INTRAVENOUS PRN
Status: DISCONTINUED | OUTPATIENT
Start: 2024-02-07 | End: 2024-02-08 | Stop reason: HOSPADM

## 2024-02-07 RX ORDER — MAGNESIUM SULFATE IN WATER 40 MG/ML
2000 INJECTION, SOLUTION INTRAVENOUS PRN
Status: DISCONTINUED | OUTPATIENT
Start: 2024-02-07 | End: 2024-02-08 | Stop reason: HOSPADM

## 2024-02-07 RX ORDER — SODIUM CHLORIDE 0.9 % (FLUSH) 0.9 %
5-40 SYRINGE (ML) INJECTION EVERY 12 HOURS SCHEDULED
Status: DISCONTINUED | OUTPATIENT
Start: 2024-02-07 | End: 2024-02-08 | Stop reason: HOSPADM

## 2024-02-07 RX ADMIN — SODIUM CHLORIDE: 9 INJECTION, SOLUTION INTRAVENOUS at 14:17

## 2024-02-07 RX ADMIN — IOPAMIDOL 75 ML: 755 INJECTION, SOLUTION INTRAVENOUS at 08:42

## 2024-02-07 RX ADMIN — SODIUM CHLORIDE 500 ML: 9 INJECTION, SOLUTION INTRAVENOUS at 08:05

## 2024-02-07 RX ADMIN — CEFTRIAXONE SODIUM 1000 MG: 1 INJECTION, POWDER, FOR SOLUTION INTRAMUSCULAR; INTRAVENOUS at 09:35

## 2024-02-07 SDOH — SOCIAL STABILITY - SOCIAL INSECURITY: PROBLEMS RELATED TO LIVING ALONE: Z60.2

## 2024-02-07 ASSESSMENT — PAIN DESCRIPTION - LOCATION: LOCATION: VAGINA

## 2024-02-07 ASSESSMENT — ENCOUNTER SYMPTOMS
BACK PAIN: 1
ABDOMINAL PAIN: 1
VOMITING: 0
BLOOD IN STOOL: 0
NAUSEA: 1
ANAL BLEEDING: 0
SHORTNESS OF BREATH: 1
SORE THROAT: 0

## 2024-02-07 ASSESSMENT — PAIN SCALES - GENERAL: PAINLEVEL_OUTOF10: 4

## 2024-02-07 NOTE — DISCHARGE INSTRUCTIONS
Strain all your urine.  If you see a stone pass, please keep this and bring it to your urology appointment

## 2024-02-07 NOTE — ED NOTES
ED UROLOGY CONSULT  RE- left renal stone  0952-Paged the Urology group  1000- Ariela burnham w/ urology returned page- transferred to

## 2024-02-07 NOTE — PROGRESS NOTES
Toilet: Standard  Bathroom Equipment: Grab bars around toilet, Grab bars in shower, Shower chair  Bathroom Accessibility: Walker accessible  Home Equipment: Cane, Walker, rolling, Grab bars (emergency pull cords in every room)  Has the patient had two or more falls in the past year or any fall with injury in the past year?: No  ADL Assistance: Independent  Homemaking Assistance: Independent  Ambulation Assistance: Independent (Ind with 4WW)  Transfer Assistance: Independent  Active : Yes (reports she has not been driving lately)  Additional Comments: Reports limited to no assistance at d/c and limited transportation. Pt reports general decline in mobility, reports not able to walk as far since moving into current apartment ~10 months ago. Reports doing cooking/cleaning when able  Vision/Hearing  Vision  Vision: Impaired  Vision Exceptions: Wears glasses at all times (bifocals)  Hearing  Hearing: Within functional limits    Cognition   Orientation  Overall Orientation Status: Within Normal Limits     Objective   Pulse: 71  Heart Rate Source: Monitor  BP: (!) 148/74  Patient Position: Semi fowlers  MAP (Calculated): 99  Respirations: 16  SpO2: 98 %  O2 Device: None (Room air)  Temp: 97.8 °F (36.6 °C)     Observation/Palpation  Posture: Fair  Gross Assessment  AROM: Within functional limits  PROM: Within functional limits  Strength: Generally decreased, functional  Sensation: Impaired (Pt reports baseline N/T)                 Bed Mobility Training  Bed Mobility Training: Yes  Supine to Sit: Supervision (HOB elevated, use of BR, increased time to complete)  Sit to Supine: Supervision (HOB elevated, use of BR, increased time to complete, difficulty initially getting BLE into car requiring increased time)  Balance  Sitting: Intact  Standing: With support  Standing - Static: Good;Constant support  Standing - Dynamic: Good;Constant support  Transfer Training  Transfer Training: Yes  Sit to Stand: Supervision (With 4WW  from EOB)  Stand to Sit: Supervision (with 4WW)  Gait Training  Gait Training: Yes  Gait  Overall Level of Assistance: Stand-by assistance  Distance (ft): 100 Feet  Assistive Device: Walker, rollator  Interventions: Safety awareness training  Base of Support: Widened  Speed/Margarita: Slow;Shuffled  Step Length: Right shortened;Left shortened  Stance: Left increased;Right increased;Time  Gait Abnormalities: Shuffling gait;Decreased step clearance (Wide CRYSTAL, decreased hip/knee flexion, increased truncal sway. Mildly unsteady without overt LOB)                            AM-PAC - Mobility    AM-PAC Basic Mobility - Inpatient   How much help is needed turning from your back to your side while in a flat bed without using bedrails?: None  How much help is needed moving from lying on your back to sitting on the side of a flat bed without using bedrails?: None  How much help is needed moving to and from a bed to a chair?: None  How much help is needed standing up from a chair using your arms?: None  How much help is needed walking in hospital room?: A Little  How much help is needed climbing 3-5 steps with a railing?: A Lot  AM-PAC Inpatient Mobility Raw Score : 21  AM-PAC Inpatient T-Scale Score : 50.25  Mobility Inpatient CMS 0-100% Score: 28.97  Mobility Inpatient CMS G-Code Modifier : CJ             Goals  Short Term Goals  Time Frame for Short Term Goals: 1 week 2/14/24 (unless othewrise specified)  Short Term Goal 1: Pt will completed bed mobility with IND  Short Term Goal 2: Pt will complete functional t/f with LRAD with Jean Carlos  Short Term Goal 3: Pt will ambulate >150' with LRAD with Jean Carlos without LOB  Short Term Goal 4: 2/11/24: Pt will participate in 12-15 reps BLE TE to improve strength and increase safety and Ind with functional mobility  Patient Goals   Patient Goals : \"Get some home therapy and help at home\"       Education  Patient Education  Education Given To: Patient  Education Provided: Role of Therapy;Plan

## 2024-02-07 NOTE — PROGRESS NOTES
Admission completed and charted. VSS. Per pt refusing surgery. Per pt needs assistance at home with cleaning and etc, social work consult placed. Bed alarm on. Bed locked and in lowest position. Call light within reach. Pt denies any other needs at this time. Will continue to monitor.

## 2024-02-07 NOTE — H&P
21-50, 11-20 epithelial cells. Rocephin IV daily, Ucx pending    Discussed management and the need for Hospitalization of the patient w/ the Emergency Department Provider    EKG:  I have reviewed the EKG with the following interpretation: Normal sinus rhythm, Borderline Prolonged QT    Physical Exam Performed:      BP (!) 143/65   Pulse 75   Temp 98 °F (36.7 °C)   Resp 15   Ht 1.651 m (5' 5\")   Wt 81.6 kg (180 lb)   SpO2 100%   BMI 29.95 kg/m²     General appearance:  No apparent distress, appears stated age and cooperative.  HEENT:  Pupils equal, round, and reactive to light. Conjunctivae/corneas clear.  Respiratory:  Normal respiratory effort. Clear to auscultation, bilaterally without Rales/Wheezes/Rhonchi.  Cardiovascular:  Regular rate and rhythm with normal S1/S2 without murmurs, rubs or gallops.  Abdomen:  Soft, non-tender, non-distended with normal bowel sounds.  Musculoskeletal:  No clubbing, cyanosis or edema bilaterally.  Full range of motion without deformity.  Neurologic:  Neurovascularly intact without any focal sensory/motor deficits. Cranial nerves: II-XII intact, grossly non-focal.  Psychiatric:  Alert and oriented, thought content appropriate, normal insight  Skin:  Skin color, texture, turgor normal.  No rashes or lesions.  Capillary Refill:  Brisk,< 3 seconds   Peripheral Pulses:  +2 palpable, equal bilaterally     Diet: [x]Adult/General  []Cardiac  []Diabetic  []Low Fat  []NPO  []NPO after Midnight  []Other   DVT Prophylaxis: [x]PPx LMWH  []SQ Heparin  []IPC/SCDs  []Eliquis  []Xarelto  []Coumadin     Code status: [x]Full  []DNR/CCA  []Limited (DNR/CCA with Do Not Intubate)  []DNRCC  PT/OT Eval Status:   []NOT yet ordered  [x]Ordered and Pending   []Seen with Recommendations for:  []Home independently  []Home w/ assist  []HHC  []SNF  []Acute Rehab    Anticipated Discharge Day/Date:  1 day    Anticipated Discharge Location: [x]Home  [x]HHC  []SNF  []Acute Rehab  []ECF  []LTAC   []Hospice    Consults:      IP CONSULT TO UROLOGY  IP CONSULT TO HOSPITALIST      This patient has a high likelihood of being discharged tomorrow and is appropriate for A1 Discharge Unit in AM pending clinical course overnight: []Yes  [x]No    --------------------------------------------------------------------------------------------------------------------------------------------------------------------    Imaging:     CT ABDOMEN PELVIS W IV CONTRAST Additional Contrast? None    Result Date: 2/7/2024  EXAMINATION: CT OF THE ABDOMEN AND PELVIS WITH CONTRAST 2/7/2024 8:29 am TECHNIQUE: CT of the abdomen and pelvis was performed with the administration of intravenous contrast. Multiplanar reformatted images are provided for review. Automated exposure control, iterative reconstruction, and/or weight based adjustment of the mA/kV was utilized to reduce the radiation dose to as low as reasonably achievable. COMPARISON: February 2021 HISTORY: ORDERING SYSTEM PROVIDED HISTORY: left lower abdominal pain, reported bleeding after urinating this morning TECHNOLOGIST PROVIDED HISTORY: Reason for exam:->left lower abdominal pain, reported bleeding after urinating this morning Additional Contrast?->None Decision Support Exception - unselect if not a suspected or confirmed emergency medical condition->Emergency Medical Condition (MA) Reason for Exam: left lower abdominal pain, back pain . pain for 1 month. reported bleeding after urinating this morning Relevant Medical/Surgical History: bowel resection FINDINGS: Lower Chest: Band like opacities are seen at the lung bases.  No pleural effusions.  Small hiatal hernia seen.  There is nonspecific thickening at the GE junction..  Nodularity is seen in the chest wall Organs: No splenomegaly.  No perisplenic fluid. Adrenal glands unremarkable.  No hydronephrosis on the right.  No hydronephrosis on the left.  Simple appearing cyst seen in left kidney. 12 mm stone seen in the left renal

## 2024-02-07 NOTE — ED PROVIDER NOTES
EMERGENCY DEPARTMENT ENCOUNTER        Pt Name: Itzel Dneg  MRN: [id number]  Birthdate 1950  Date of evaluation: 2/7/2024  Provider: Raymundo Ngo MD  PCP: No primary care provider on file.      CHIEF COMPLAINT       Chief Complaint   Patient presents with    Rectal Bleeding     Pt to er with rectal bleed. Pt states when she wiped she saw blood. Pt does have hx of roids and states has been using pre H for it    Nausea     For at least a month. Was given zofran in squad    Vaginal Discharge     Pt also states haviong some vaginal dischasrge       HISTORY OFPRESENT ILLNESS   (Location/Symptom, Timing/Onset, Context/Setting, Quality, Duration, Modifying Factors,Severity)  Note limiting factors.     Itzel Deng is a 73 y.o. female presenting today due to concern for having nausea over the last month but noticing over the last week left-sided abdominal pain that does radiate towards the left side of her back and then stating this morning when she urinated she had blood when she wiped.  She denies any actual rectal bleeding or blood in the stool when I asked her.  She is unsure if it is vaginal bleeding or urinary related.  She states \"it came from my pee pee.\"  She states that is not in my \"poo.\"  Based on chart review she did have a exploratory laparotomy in February 2021 where she had a sigmoidectomy.  She did report some chest pain yesterday but none today.  She does report some vaginal discharge.  She reports bilateral chronic hip pain and states she does not think she could have a vaginal exam based on her chronic arthritis.  She denies any actual vomiting.  No fever.  She does occasionally feel short of breath.  She reports pain all over on top of her new onset left-sided abdominal pain.  No reported falls or trauma.  Due to concern for vaginal/urinary tract bleeding this morning, she came to the ED by EMS for further evaluation.  She received Zofran prior to arrival by EMS and reports  can see her in the hospital.  She denied any rectal bleeding and declined rectal exam at this time and declined pelvic exam.  She was stable for admission and agreed with this plan.      Nurse also felt that admission was reasonable since he reported that she had trouble with ambulation when she tried to use the restroom and seemed very weak with this which would be another reason to admit her and she may also may need PT/OT evaluation to be safe.    There was incidental finding of adnexal cyst and chest skin lesion that can be followed up by hospitalist.  Please see their note related to this.    I was the primary provider for the patient.      The patient tolerated their visit well.   The patient and / or the family were informed of the results of any tests, a time was given to answer questions.    FINAL IMPRESSION      1. Acute pyelonephritis    2. Calculus of distal left ureter    3. Nausea    4. Hematuria, unspecified type    5. Lives alone    6. Adnexal cyst    7. Chest skin lesion          DISPOSITION/PLAN   DISPOSITION Admitted 02/07/2024 11:07:18 AM      PATIENT REFERRED TO:  No follow-up provider specified.      DISCHARGEMEDICATIONS:  Current Discharge Medication List          DISCONTINUED MEDICATIONS:  Current Discharge Medication List        STOP taking these medications       bismuth subsalicylate (PEPTO BISMOL) 262 MG/15ML suspension Comments:   Reason for Stopping:                      (Please note that portions of this note were completed with a voicerecognition program.  Efforts were made to edit the dictations but occasionally words are mis-transcribed.)    Raymundo Ngo MD (electronically signed)            Raymundo Ngo MD  02/08/24 [id number]

## 2024-02-07 NOTE — CARE COORDINATION
Case Management Assessment  Initial Evaluation    Date/Time of Evaluation: 2/7/2024 11:12 AM  Assessment Completed by: NOÉ Gómez    If patient is discharged prior to next notation, then this note serves as note for discharge by case management.    Patient Name: Itzel Deng                   YOB: 1950  Diagnosis: Kidney stone [N20.0]                   Date / Time: 2/7/2024  7:11 AM    Patient Admission Status: Observation   Readmission Risk (Low < 19, Mod (19-27), High > 27): No data recorded  Current PCP: No primary care provider on file.  PCP verified by CM? (P) Yes    Chart Reviewed: Yes      History Provided by: (P) Patient  Patient Orientation: (P) Alert and Oriented    Patient Cognition: (P) Alert    Hospitalization in the last 30 days (Readmission):  No    If yes, Readmission Assessment in CM Navigator will be completed.    Advance Directives:      Code Status: Prior   Patient's Primary Decision Maker is: (P) Legal Next of Kin    Primary Decision Maker: Madhuri Le [facility] - [phone]    Discharge Planning:    Patient lives with: (P) Alone Type of Home: (P) Apartment  Primary Care Giver: (P) Self  Patient Support Systems include: (P) Children, Friends/Neighbors   Current Financial resources: (P) None  Current community resources: (P) ECF/Home Care  Current services prior to admission: (P) Durable Medical Equipment            Current DME: (P) [facility]            Type of Home Care services:  (P) PT, OT, Nursing Services    ADLS  Prior functional level: (P) Independent in ADLs/IADLs  Current functional level: (P) Independent in ADLs/IADLs    PT AM-PAC:   /24  OT AM-PAC:   /24    Family can provide assistance at DC: (P) No  Would you like Case Management to discuss the discharge plan with any other family members/significant others, and if so, who? (P) No  Plans to Return to Present Housing: (P) Yes  Other Identified Issues/Barriers to RETURNING to current housing: none  noted  Potential Assistance needed at discharge: (P) Home Care            Potential DME:    Patient expects to discharge to: (P) Apartment  Plan for transportation at discharge: (P) Self    Financial    Payor: MEDICARE / Plan: MEDICARE PART A AND B / Product Type: *No Product type* /     Does insurance require precert for SNF: No    Potential assistance Purchasing Medications: (P) No  Meds-to-Beds request:        [facility] [id number] - [place], [place] - [address] - P [phone] - F [phone]  [address]  [place] [address]  Phone: [phone] Fax: [phone]      Notes:    Factors facilitating achievement of predicted outcomes: Motivated, Cooperative, Pleasant, Sense of humor, and Has needed Durable Medical Equipment at home    Barriers to discharge: Limited family support, Upper extremity weakness, Lower extremity weakness, and Medical complications    Additional Case Management Notes: Referred to patient for d/c planning.  Spoke to patient.  Patient is a 73 year old female admitted for kidney stone.  Patient lives at home alone.  Patient reports she is independent in ADLs.  Patient interested in possible home care on d/c.  Patient reports increased difficulty with cleaning and laundry.  Information on private duty and [facility] provided.  Patient with no PCP, gave list of PCPs in network with insurance.  Patient also asking about scooter.  Patient provided information for [facility].  No other needs at this time.     The Plan for Transition of Care is related to the following treatment goals of Kidney stone [N20.0]    IF APPLICABLE: The Patient and/or patient representative Itzel and her family were provided with a choice of provider and agrees with the discharge plan. Freedom of choice list with basic dialogue that supports the patient's individualized plan of care/goals and shares the quality data associated with the providers was provided to:     Patient Representative

## 2024-02-07 NOTE — PROGRESS NOTES
4 Eyes Skin Assessment     The patient is being assess for  Admission    I agree that 2 RN's have performed a thorough Head to Toe Skin Assessment on the patient. ALL assessment sites listed below have been assessed.       Areas assessed by both nurses:   [x]   Head, Face, and Ears   [x]   Shoulders, Back, and Chest  [x]   Arms, Elbows, and Hands   [x]   Coccyx, Sacrum, and Ischum  [x]   Legs, Feet, and Heels        Does the Patient have Skin Breakdown?  No         Dennis Prevention initiated:  No   Wound Care Orders initiated:  No      [facility] nurse consulted for Pressure Injury (Stage 3,4, Unstageable, DTI, NWPT, and Complex wounds):  No      Nurse 1 eSignature: Electronically signed by Kiya Brenner RN on 2/7/24 at 3:23 PM EST    **SHARE this note so that the co-signing nurse is able to place an eSignature**    Nurse 2 eSignature: Electronically signed by Gini Cavazos RN on 2/7/24 at 3:24 PM EST

## 2024-02-08 ENCOUNTER — APPOINTMENT (OUTPATIENT)
Dept: ULTRASOUND IMAGING | Age: 74
End: 2024-02-08
Payer: MEDICARE

## 2024-02-08 PROBLEM — N94.9 ADNEXAL CYST: Status: ACTIVE | Noted: 2024-02-08

## 2024-02-08 PROBLEM — L98.9 CHEST SKIN LESION: Status: ACTIVE | Noted: 2024-02-08

## 2024-02-08 LAB
ANION GAP SERPL CALCULATED.3IONS-SCNC: 10 MMOL/L (ref 3–16)
BACTERIA UR CULT: NORMAL
BASOPHILS # BLD: 0 K/UL (ref 0–0.2)
BASOPHILS NFR BLD: 0.4 %
BUN SERPL-MCNC: 12 MG/DL (ref 7–20)
C TRACH DNA CVX QL NAA+PROBE: NEGATIVE
CALCIUM SERPL-MCNC: 8.8 MG/DL (ref 8.3–10.6)
CHLORIDE SERPL-SCNC: 108 MMOL/L (ref 99–110)
CO2 SERPL-SCNC: 23 MMOL/L (ref 21–32)
CREAT SERPL-MCNC: 0.6 MG/DL (ref 0.6–1.2)
DEPRECATED RDW RBC AUTO: 13.3 % (ref 12.4–15.4)
EOSINOPHIL # BLD: 0 K/UL (ref 0–0.6)
EOSINOPHIL NFR BLD: 0.7 %
GFR SERPLBLD CREATININE-BSD FMLA CKD-EPI: >60 ML/MIN/{1.73_M2}
GLUCOSE SERPL-MCNC: 117 MG/DL (ref 70–99)
HCT VFR BLD AUTO: 41.6 % (ref 36–48)
HGB BLD-MCNC: 14.2 G/DL (ref 12–16)
LYMPHOCYTES # BLD: 1.1 K/UL (ref 1–5.1)
LYMPHOCYTES NFR BLD: 23.4 %
MCH RBC QN AUTO: 32.1 PG (ref 26–34)
MCHC RBC AUTO-ENTMCNC: 34.3 G/DL (ref 31–36)
MCV RBC AUTO: 93.7 FL (ref 80–100)
MONOCYTES # BLD: 0.3 K/UL (ref 0–1.3)
MONOCYTES NFR BLD: 5.5 %
N GONORRHOEA DNA CERV MUCUS QL NAA+PROBE: NEGATIVE
NEUTROPHILS # BLD: 3.3 K/UL (ref 1.7–7.7)
NEUTROPHILS NFR BLD: 70 %
PLATELET # BLD AUTO: 131 K/UL (ref 135–450)
PMV BLD AUTO: 8.8 FL (ref 5–10.5)
POTASSIUM SERPL-SCNC: 3.6 MMOL/L (ref 3.5–5.1)
RBC # BLD AUTO: 4.44 M/UL (ref 4–5.2)
SODIUM SERPL-SCNC: 141 MMOL/L (ref 136–145)
WBC # BLD AUTO: 4.7 K/UL (ref 4–11)

## 2024-02-08 PROCEDURE — 6370000000 HC RX 637 (ALT 250 FOR IP): Performed by: NURSE PRACTITIONER

## 2024-02-08 PROCEDURE — 97530 THERAPEUTIC ACTIVITIES: CPT

## 2024-02-08 PROCEDURE — G0378 HOSPITAL OBSERVATION PER HR: HCPCS

## 2024-02-08 PROCEDURE — 80048 BASIC METABOLIC PNL TOTAL CA: CPT

## 2024-02-08 PROCEDURE — 2580000003 HC RX 258: Performed by: NURSE PRACTITIONER

## 2024-02-08 PROCEDURE — 36415 COLL VENOUS BLD VENIPUNCTURE: CPT

## 2024-02-08 PROCEDURE — 96361 HYDRATE IV INFUSION ADD-ON: CPT

## 2024-02-08 PROCEDURE — 96366 THER/PROPH/DIAG IV INF ADDON: CPT

## 2024-02-08 PROCEDURE — 97166 OT EVAL MOD COMPLEX 45 MIN: CPT

## 2024-02-08 PROCEDURE — 76856 US EXAM PELVIC COMPLETE: CPT

## 2024-02-08 PROCEDURE — 85025 COMPLETE CBC W/AUTO DIFF WBC: CPT

## 2024-02-08 PROCEDURE — 6360000002 HC RX W HCPCS: Performed by: NURSE PRACTITIONER

## 2024-02-08 RX ORDER — TAMSULOSIN HYDROCHLORIDE 0.4 MG/1
0.4 CAPSULE ORAL DAILY
Qty: 30 CAPSULE | Refills: 3 | Status: SHIPPED | OUTPATIENT
Start: 2024-02-09

## 2024-02-08 RX ORDER — CEFDINIR 300 MG/1
300 CAPSULE ORAL 2 TIMES DAILY
Qty: 10 CAPSULE | Refills: 0 | Status: SHIPPED | OUTPATIENT
Start: 2024-02-08 | End: 2024-02-13

## 2024-02-08 RX ORDER — TAMSULOSIN HYDROCHLORIDE 0.4 MG/1
0.4 CAPSULE ORAL DAILY
Status: DISCONTINUED | OUTPATIENT
Start: 2024-02-08 | End: 2024-02-08 | Stop reason: HOSPADM

## 2024-02-08 RX ADMIN — ONDANSETRON 4 MG: 4 TABLET, ORALLY DISINTEGRATING ORAL at 03:28

## 2024-02-08 RX ADMIN — ACETAMINOPHEN 650 MG: 325 TABLET ORAL at 05:30

## 2024-02-08 RX ADMIN — CEFTRIAXONE SODIUM 1000 MG: 1 INJECTION, POWDER, FOR SOLUTION INTRAMUSCULAR; INTRAVENOUS at 08:48

## 2024-02-08 RX ADMIN — SODIUM CHLORIDE: 9 INJECTION, SOLUTION INTRAVENOUS at 03:18

## 2024-02-08 ASSESSMENT — PAIN SCALES - GENERAL
PAINLEVEL_OUTOF10: 5
PAINLEVEL_OUTOF10: 3

## 2024-02-08 NOTE — PROGRESS NOTES
Patient requested tylenol and hot packs. RN brought tylenol and hot packs to bedside. RN scanned tylenol into med rec and patient refused tylenol after rn put it into the cup. RN wasted the med in the omnicel.

## 2024-02-08 NOTE — PROGRESS NOTES
past year or any fall with injury in the past year?: No  ADL Assistance: Independent  Homemaking Assistance: Independent (reports difficulty recently)  Ambulation Assistance: Independent (Ind with 4WW)  Transfer Assistance: Independent  Active : Yes (reports she has not been driving lately)  Additional Comments: Reports limited to no assistance at d/c and limited transportation. Pt reports general decline in mobility, reports not able to walk as far since moving into current apartment ~10 months ago. Reports doing cooking/cleaning when able       Objective   BP: 112/67  HR: 75  SpO2: 96%               Observation/Palpation  Posture: Fair  Safety Devices  Type of Devices: Patient at risk for falls;All fall risk precautions in place;Call light within reach;Nurse notified;Gait belt;Chair alarm in place;Left in chair  Restraints  Restraints Initially in Place: No    Bed Mobility Training  Bed Mobility Training: No (seated EOB upon arrival and in chair at end of session)  Balance  Sitting: Intact  Standing: With support  Standing - Static: Good;Constant support  Standing - Dynamic: Good;Constant support  Transfer Training  Transfer Training: Yes  Sit to Stand: Supervision;Adaptive equipment (With 4WW from EOB x2 trials)  Stand to Sit: Supervision;Adaptive equipment (with 4WW to EOB and chair)  Bed to Chair: Stand-by assistance;Adaptive equipment (4WW)       AROM: Generally decreased, functional  Strength: Generally decreased, functional  Coordination: Generally decreased, functional  Tone: Normal  Sensation: Intact    ADL  Functional Mobility: Stand by assistance  Functional Mobility Skilled Clinical Factors: Pt performed functional mobility in room and [place] with 4WW and SBA.  Additional Comments: Pt declined ADLs, reports just using restroom prior to OT arrival                Vision  Vision: Impaired  Vision Exceptions: Wears glasses at all times (bifocals)  Hearing  Hearing: Within functional  limits    Cognition  Overall Cognitive Status: Exceptions  Arousal/Alertness: Appropriate responses to stimuli  Following Commands: Follows one step commands with increased time;Follows one step commands with repetition  Attention Span: Attends with cues to redirect;Difficulty attending to directions;Difficulty dividing attention  Memory: Appears intact  Safety Judgement: Decreased awareness of need for assistance;Decreased awareness of need for safety  Problem Solving: Assistance required to generate solutions  Insights: Decreased awareness of deficits  Initiation: Requires cues for some  Sequencing: Requires cues for some  Orientation  Overall Orientation Status: Within Functional Limits  Orientation Level: Oriented X4                    Education Given To: Patient  Education Provided: Role of Therapy;Plan of Care;Transfer Training;Fall Prevention Strategies  Education Provided Comments: importance of safety while in hospital (using nurse call light to get OOB)  Education Method: Verbal  Barriers to Learning: Cognition  Education Outcome: Continued education needed;Verbalized understanding  Disease Specific Education: Pt educated on importance of OOB mobility, prevention of complications of bedrest, and general safety during hospitalization. Pt verbalized understanding         AM-PAC - ADL  AM-PAC Daily Activity - Inpatient   How much help is needed for putting on and taking off regular lower body clothing?: A Little  How much help is needed for bathing (which includes washing, rinsing, drying)?: A Lot  How much help is needed for toileting (which includes using toilet, bedpan, or urinal)?: A Little  How much help is needed for putting on and taking off regular upper body clothing?: A Little  How much help is needed for taking care of personal grooming?: A Little  How much help for eating meals?: None  AM-[facility] Inpatient Daily Activity Raw Score: 18  AM-PAC Inpatient ADL T-Scale Score : 38.66  ADL Inpatient CMS

## 2024-02-08 NOTE — DISCHARGE SUMMARY
Hospital Medicine Discharge Summary    Patient: Itzel Deng   : 1950     Admit Date: 2024   Discharge Date: 2024    Disposition:  [x]Home   []HHC  []SNF  []ECF  []Acute Rehab  []LTAC  []Hospice  Code status:  [x]Full  []DNR/CCA  []Limited (DNR/CCA with Do Not Intubate)  []DNRCC  Condition at Discharge: Stable  Primary Care Provider: No primary care provider on file.    Admitting Provider: Be Jarrett MD  Discharge Provider: Mukul Oro APRN - CNP     Discharge Diagnoses:      Active Hospital Problems    Diagnosis     Chest skin lesion [L98.9]     Adnexal cyst [N94.9]     Kidney stone [N20.0]        Presenting Admission History:      73 y.o. female with a PMH of Depression, OA, and  Hemorrhoids who presented to [facility] with a complaint of left-sided abdominal pain that radiates towards the left side of her back and then stating this morning when she urinated she had blood when she wiped. Also reports sl nausea.      In ED, CT a/p: 12 mm stone seen in the left renal pelvis. There is perinephric fat stranding seen on the left. A tiny stone is seen in the distal left ureter, proximal to the left UVJ, measuring 2 mm.. Cystic nodule in the left pelvis is seen, increased in size compared to  prior.  Given its non simple appearance, consider pelvic ultrasound for  further characterization as this may represent cystic neoplasm. Patent declined pelvic exam in ED. UA-mod leuks, wbc 21-50, 11-20 epithelial cells     Urology was consulted in ED and felt patient could be discharged and follow up outpatient from urology standpoint, however patient appeared generally weak and unsteady.  was consulted for admission.      Abdominal pain-unclear etiology, possibly due to kidney stone, UTI  -CT a/p: 12 mm stone seen in the left renal pelvis. There is perinephric fat stranding seen on the left. A tiny stone is seen in the distal left ureter, proximal to the left UVJ, measuring 2 mm.. Cystic nodule

## 2024-02-08 NOTE — CONSULTS
Urology Consult Note      Reason for Consultation: renal and ureteral stone    Chief Complaint: \"blood in my urine\"  HPI:  Itzel is a 73 y.o. female who presented to the ER with gross hematuria.  She reports she noticed it in her urine yesterday and therefore called 911 to take her to the ER.  She denies any significant acute abdominal pain.  She does admit to pain all over- hips, back, upper abdomen that has been present for a long time.  She denies any dysuria, urgency, or frequency.  ER w/u revealed no fever, no leucocytosis, UA with 11-20 epi cells, 21-50 wbc, and mod LE.  A CT AP w IV contrast revealed a 12 mm left renal pelvis stone and a 2mm distal left ureteral stone, but NO left hydronephrosis.  Initial ER plan was to discharge home with outpatient urologic follow up, but patient reported weakness and inability to care for herself, so she was admitted for observation    Past Medical History:   Diagnosis Date    Acute hemorrhoid     Depression     Elevated blood pressure reading in office with white coat syndrome, without diagnosis of hypertension     Osteoarthritis        Past Surgical History:   Procedure Laterality Date    BREAST ENHANCEMENT SURGERY  1968    silicon injected    CT RETROPERITONEAL PERC DRAIN  2/2/2021    CT RETROPERITONEAL PERC DRAIN 2/2/2021 Chirag No MD [facility] CT SCAN    SMALL INTESTINE SURGERY N/A 2/18/2021    EXPLORATORY LAPAROTOMY, SIGMOIDECTOMY WITH ANASTOMOSIS performed by Venkatesh John MD at [facility] OR    TUBAL LIGATION         Medication List reviewed:      Current Facility-Administered Medications   Medication Dose Route Frequency Provider Last Rate Last Admin    sodium chloride flush 0.9 % injection 5-40 mL  5-40 mL IntraVENous 2 times per day Mukul Oro, EMA - CNP        sodium chloride flush 0.9 % injection 5-40 mL  5-40 mL IntraVENous PRN Mukul Oro, APRN - CNP        0.9 % sodium chloride infusion   IntraVENous PRN Mukul Oro, EMA - CNP        potassium  chloride (KLOR-CON M) extended release tablet 40 mEq  40 mEq Oral PRN Oro, Mukul, APRN - CNP        Or    potassium bicarb-citric acid (EFFER-K) effervescent tablet 40 mEq  40 mEq Oral PRN Oro, Mukul, APRN - CNP        Or    potassium chloride 10 mEq/100 mL IVPB (Peripheral Line)  10 mEq IntraVENous PRN Oro, Mukul, APRN - CNP        magnesium sulfate 2000 mg in 50 mL IVPB premix  2,000 mg IntraVENous PRN Oro, Mukul, APRN - CNP        enoxaparin (LOVENOX) injection 40 mg  40 mg SubCUTAneous Daily Oro, Mukul, APRN - CNP        ondansetron (ZOFRAN-ODT) disintegrating tablet 4 mg  4 mg Oral Q8H PRN Oro, Mukul, APRN - CNP   4 mg at 24 0328    Or    ondansetron (ZOFRAN) injection 4 mg  4 mg IntraVENous Q6H PRN Oro, Mukul, APRN - CNP        polyethylene glycol (GLYCOLAX) packet 17 g  17 g Oral Daily PRN Oro, Mukul, APRN - CNP        acetaminophen (TYLENOL) tablet 650 mg  650 mg Oral Q6H PRN Oro, Mukul, APRN - CNP   650 mg at 24 0530    Or    acetaminophen (TYLENOL) suppository 650 mg  650 mg Rectal Q6H PRN Oro, Mukul, APRN - CNP        0.9 % sodium chloride infusion   IntraVENous Continuous Oro, Mukul, APRN - CNP 75 mL/hr at 24 0318 New Bag at 24 0318    cefTRIAXone (ROCEPHIN) 1,000 mg in sodium chloride 0.9 % 50 mL IVPB (mini-bag)  1,000 mg IntraVENous Q24H Oro, Mukul, APRN - CNP           Allergies   Allergen Reactions    Codeine Other (See Comments)     Ears ringing    Morphine Other (See Comments)     hallucinations       Family History   Problem Relation Age of Onset    Cancer Mother        Social History     Tobacco Use    Smoking status: Former     Current packs/day: 0.00     Average packs/day: 1 pack/day for 10.0 years (10.0 ttl pk-yrs)     Types: Cigarettes     Start date:      Quit date:      Years since quittin.1    Smokeless tobacco: Never   Vaping Use    Vaping Use: Never used   Substance Use Topics    Alcohol use: Not Currently    Drug use: No         Review of

## 2024-02-08 NOTE — PROGRESS NOTES
Hospital Medicine Progress Note      Date of Admission: 2/7/2024  Hospital Day: 2    Chief Admission Complaint:  nausea, left side flank pain     Subjective:  no voiced complaints this morning, other than not getting breakfast in a timely manner. Patient reports she needs to leave at 5p    Presenting Admission History:       73 y.o. female with a PMH of Depression, OA, and  Hemorrhoids who presented to [facility] with a complaint of left-sided abdominal pain that radiates towards the left side of her back and then stating this morning when she urinated she had blood when she wiped. Also reports sl nausea.      In ED, CT a/p: 12 mm stone seen in the left renal pelvis. There is perinephric fat stranding seen on the left. A tiny stone is seen in the distal left ureter, proximal to the left UVJ, measuring 2 mm.. Cystic nodule in the left pelvis is seen, increased in size compared to  prior.  Given its non simple appearance, consider pelvic ultrasound for  further characterization as this may represent cystic neoplasm. Patent declined pelvic exam in ED. UA-mod leuks, wbc 21-50, 11-20 epithelial cells     Urology was consulted in ED and felt patient could be discharged and follow up outpatient from urology standpoint, however patient appeared generally weak and unsteady.  was consulted for admission.     Assessment/Plan:      Current Principal Problem:  Kidney stone    Abdominal pain-unclear etiology, possibly due to kidney stone, UTI  -CT a/p: 12 mm stone seen in the left renal pelvis. There is perinephric fat stranding seen on the left. A tiny stone is seen in the distal left ureter, proximal to the left UVJ, measuring 2 mm.. Cystic nodule in the left pelvis is seen, increased in size compared to  prior.  Given its non simple appearance, consider pelvic ultrasound for  further characterization as this may represent cystic neoplasm.   Pelvic u/s ordered/pending  Patent declined pelvic exam in ED.   UA-mod leuks,  wbc 21-50, 11-20 epithelial cells. Rocephin IV daily, Ucx >50,000 CFU/ml mixed skin/urogenital marisol. Urology signed off-f/u op (flomax)       Physical Exam Performed:      General appearance:  No apparent distress  Respiratory:  Normal respiratory effort.   Cardiovascular:  Regular rate and rhythm.  Abdomen:  Soft, non-tender, non-distended.  Musculoskeletal:  No edema  Neurologic:  Non-focal  Psychiatric:  Alert and oriented    /67   Pulse 73   Temp 98.3 °F (36.8 °C) (Oral)   Resp 15   Ht 1.651 m (5' 5\")   Wt 81.6 kg (180 lb)   SpO2 97%   BMI 29.95 kg/m²     Diet: ADULT DIET; Regular  DVT Prophylaxis: [x]PPx LMWH  []SQ Heparin  []IPC/SCDs  []Eliquis  []Xarelto  []Coumadin  []Other -      Code status: Full Code  PT/OT Eval Status:   []NOT yet ordered  []Ordered and Pending   [x]Seen with Recommendations for:  []Home independently  [x]Home w/ assist  []HHC  []SNF  []Acute Rehab    Anticipated Discharge Day/Date:  anticipate 2/8    Anticipated Discharge Location: []Home  []HHC  []SNF  []Acute Rehab  []ECF  []LTAC  []Hospice  []Other -      Consults:      IP CONSULT TO UROLOGY  IP CONSULT TO HOSPITALIST      This patient has a high likelihood of being discharged tomorrow and is appropriate for A1 Discharge Unit in AM pending clinical course overnight: []Yes  []No    ------------------------------------------------------------------------------------------------------------------------------------------------------------------------    MDM      [x] High (any 2)    A. Problems (any 1)  [x] Acute/Chronic Illness/injury posing threat to life or bodily function:    [] Severe exacerbation of chronic illness:    ---------------------------------------------------------------------  B. Risk of Treatment (any 1)   [] Drugs/treatments that require intensive monitoring for toxicity include:    [] Change in code status:    [] Decision to escalate care:    [] Major surgery/procedure with associated risk factors:

## 2024-02-08 NOTE — PLAN OF CARE
Problem: Discharge Planning  Goal: Discharge to home or other facility with appropriate resources  Outcome: Adequate for Discharge     Problem: Pain  Goal: Verbalizes/displays adequate comfort level or baseline comfort level  Outcome: Adequate for Discharge     Problem: Safety - Adult  Goal: Free from fall injury  Outcome: Adequate for Discharge     Problem: ABCDS Injury Assessment  Goal: Absence of physical injury  Outcome: Adequate for Discharge

## 2024-02-08 NOTE — CARE COORDINATION
Chart reviewed. Patient in observation. Patient here for kidney stone. Urology following. US of pellvis ordered. Therapy recommendations are home w/HHC>[facility] accepted. Patient independent, lives alone. TITA BASILIO gave [facility] info, Private duty list, PCP list and [employer] info for scooter.     Trena Richardson RN

## 2024-02-08 NOTE — CARE COORDINATION
CM spoke to patient at bedside about home care and she would like services on Dc. Patient would like referral to [facility].    [id number] referral sent to Malina hugo/[facility].    Trena Richardson RN

## 2024-02-08 NOTE — CARE COORDINATION
Cm reviewed chart patient had [facility] in 2021, writer will speak to patient if she would like their services again.    Trena Richardson RN

## 2024-02-08 NOTE — CARE COORDINATION
[facility]    Referral received from  to follow for home care services.   I will follow for needs, and speak with patient to verify demos.    Pt/ot    Malina Thompson RN, BSN -[phone]  [facility]   [phone] fax [phone]  [facility] -[phone]  [facility] -[phone]

## 2024-02-08 NOTE — DISCHARGE INSTR - COC
Continuity of Care Form    Patient Name: Itzel Degn   :  1950  MRN:  [id number]    Admit date:  2024  Discharge date:  ***    Code Status Order: Full Code   Advance Directives:     Admitting Physician:  Be Jarrett MD  PCP: No primary care provider on file.    Discharging Nurse: ***  Discharging Hospital Unit/Room#: 0120/0120-01  Discharging Unit Phone Number: ***    Emergency Contact:   Extended Emergency Contact Information  Primary Emergency Contact:   Address: [address] [place] [address]  Home Phone: [phone]  Mobile Phone: [phone]  Relation: Child    Past Surgical History:  Past Surgical History:   Procedure Laterality Date    BREAST ENHANCEMENT SURGERY  1968    silicon injected    CT RETROPERITONEAL PERC DRAIN  2021    CT RETROPERITONEAL PERC DRAIN 2021 Chirag No MD [facility] CT SCAN    SMALL INTESTINE SURGERY N/A 2021    EXPLORATORY LAPAROTOMY, SIGMOIDECTOMY WITH ANASTOMOSIS performed by Venkatesh John MD at [facility] OR    TUBAL LIGATION         Immunization History:   Immunization History   Administered Date(s) Administered    Influenza, FLUZONE (age 65 y+), High Dose, 0.7mL 2020    PPD Test 2021    Pneumococcal, PCV-13, PREVNAR 13, (age 6w+), IM, 0.5mL 08/10/2017    Pneumococcal, PPSV23, PNEUMOVAX 23, (age 2y+), SC/IM, 0.5mL 2020    TDaP, ADACEL (age 10y-64y), BOOSTRIX (age 10y+), IM, 0.5mL 08/10/2017       Active Problems:  Patient Active Problem List   Diagnosis Code    Primary osteoarthritis of both hips M16.0    Lung nodule seen on imaging study R91.1    Neck pain M54.2    Mixed hyperlipidemia E78.2    White coat syndrome without diagnosis of hypertension R03.0    Osteopenia of multiple sites M85.89    Family history of breast cancer in mother Z80.3    Asymptomatic varicose veins of right lower extremity I83.91    Stress reaction F43.0    Perforation of sigmoid colon (HCC) K63.1

## 2024-02-20 ENCOUNTER — TELEPHONE (OUTPATIENT)
Dept: SURGERY | Age: 74
End: 2024-02-20

## 2024-02-20 NOTE — TELEPHONE ENCOUNTER
I called pt back and informed her that Dr. John needs to see her for a Consult in office. She understood and agreed. She is going to have her daughter call to schedule her since she drives her.

## 2024-02-20 NOTE — TELEPHONE ENCOUNTER
Pt called and said she had colon resection back on 2/18/2021 by Dr. John. She said last week she had a little blood coming out of her peepee. She was in AND hospital 2/7/24 - 2/8 and had some tests done. She said they did find some lrg & small stones. But she said there was something else that Dr. John saw when he did her sx back then, but she said the abscess or whatever it was that he didn't deal with at that time has grown. She said she would like for him to review her results to please let her know what he thinks. She said that it is hard for her to get here for appt's due to not having transportation. Her dtr works a lot. Please call her and thank you! # [phone]

## 2024-03-01 ENCOUNTER — INITIAL CONSULT (OUTPATIENT)
Dept: SURGERY | Age: 74
End: 2024-03-01
Payer: MEDICARE

## 2024-03-01 VITALS — BODY MASS INDEX: 29.95 KG/M2 | DIASTOLIC BLOOD PRESSURE: 80 MMHG | HEIGHT: 65 IN | SYSTOLIC BLOOD PRESSURE: 140 MMHG

## 2024-03-01 DIAGNOSIS — N83.202 CYST OF LEFT OVARY: Primary | ICD-10-CM

## 2024-03-01 PROCEDURE — 99203 OFFICE O/P NEW LOW 30 MIN: CPT | Performed by: SURGERY

## 2024-03-01 PROCEDURE — G8419 CALC BMI OUT NRM PARAM NOF/U: HCPCS | Performed by: SURGERY

## 2024-03-01 PROCEDURE — 1036F TOBACCO NON-USER: CPT | Performed by: SURGERY

## 2024-03-01 PROCEDURE — 1123F ACP DISCUSS/DSCN MKR DOCD: CPT | Performed by: SURGERY

## 2024-03-01 PROCEDURE — 1090F PRES/ABSN URINE INCON ASSESS: CPT | Performed by: SURGERY

## 2024-03-01 PROCEDURE — 3017F COLORECTAL CA SCREEN DOC REV: CPT | Performed by: SURGERY

## 2024-03-01 PROCEDURE — G8427 DOCREV CUR MEDS BY ELIG CLIN: HCPCS | Performed by: SURGERY

## 2024-03-01 PROCEDURE — G8399 PT W/DXA RESULTS DOCUMENT: HCPCS | Performed by: SURGERY

## 2024-03-01 PROCEDURE — G8484 FLU IMMUNIZE NO ADMIN: HCPCS | Performed by: SURGERY

## 2024-03-02 NOTE — PROGRESS NOTES
has been completed and updates made to the patient's EMR where indicated.     Vitals:    03/01/24 1428   BP: (!) 140/80   Site: Left Upper Arm   Position: Sitting   Cuff Size: Medium Adult   Height: 1.651 m (5' 5\")     Body mass index is 29.95 kg/m².     Wt Readings from Last 3 Encounters:   02/07/24 81.6 kg (180 lb)   06/01/22 72.6 kg (160 lb)   02/27/21 59.6 kg (131 lb 8 oz)     BP Readings from Last 3 Encounters:   03/01/24 (!) 140/80   02/07/24 112/67   06/01/22 (!) 146/66          REVIEW OF SYSTEMS:   All other systems reviewed; please refer to HPI with pertinent positives, all other ROS are negative    PHYSICAL EXAM:    CONSTITUTIONAL:  awake, alert, no apparent distress and normal weight  ENT:  normocepalic, without obvious abnormality  NECK:  supple, symmetrical, trachea midline   LUNGS:    CARDIOVASCULAR:  regular rate and rhythm   ABDOMEN:  scars noted, soft, non-distended,   MUSCULOSKELETAL:  0+ pitting edema lower extremities  NEUROLOGIC:  Mental Status Exam:  Level of Alertness:   awake  Orientation:   person, place, time      DATA:  Old records have been reviewed    CT abd/pelvis (images reviewed by me)    IMPRESSION:  12 mm stone seen in the left renal pelvis. There is perinephric fat stranding  seen on the left. A tiny stone is seen in the distal left ureter, proximal to  the left UVJ, measuring 2 mm.. No significant hydronephrosis is seen on the  left..  Recommend urinalysis correlation to exclude underlying infection     Cystic nodule in the left pelvis is seen, increased in size compared to  prior.  Given its non simple appearance, consider pelvic ultrasound for  further characterization as this may represent cystic neoplasm     Soft tissue nodularity is seen in the chest wall.  This is incompletely  characterized but may relate to prior silicone injection.  Recommend  correlation with any recent mammograms         Assessment:  1. Cyst of left ovary      On my review of the CT,  I strongly suspect

## 2024-03-06 ENCOUNTER — TRANSCRIBE ORDERS (OUTPATIENT)
Dept: ADMINISTRATIVE | Age: 74
End: 2024-03-06

## 2024-03-06 DIAGNOSIS — N20.0 CALCULUS OF KIDNEY: ICD-10-CM

## 2024-03-06 DIAGNOSIS — N20.1 CALCULUS OF URETER: Primary | ICD-10-CM

## 2024-03-08 ENCOUNTER — HOSPITAL ENCOUNTER (OUTPATIENT)
Age: 74
End: 2024-03-08
Payer: MEDICARE

## 2024-03-08 ENCOUNTER — HOSPITAL ENCOUNTER (OUTPATIENT)
Dept: GENERAL RADIOLOGY | Age: 74
Discharge: HOME | End: 2024-03-08
Payer: MEDICARE

## 2024-03-08 ENCOUNTER — HOSPITAL ENCOUNTER (OUTPATIENT)
Dept: CT IMAGING | Age: 74
Discharge: HOME | End: 2024-03-08
Payer: MEDICARE

## 2024-03-08 DIAGNOSIS — N20.0 URIC ACID NEPHROLITHIASIS: ICD-10-CM

## 2024-03-08 DIAGNOSIS — N20.1 CALCULUS OF URETER: ICD-10-CM

## 2024-03-08 DIAGNOSIS — N20.0 CALCULUS OF KIDNEY: ICD-10-CM

## 2024-03-08 PROCEDURE — 74176 CT ABD & PELVIS W/O CONTRAST: CPT

## 2024-03-08 PROCEDURE — 74018 RADEX ABDOMEN 1 VIEW: CPT

## 2024-03-27 ENCOUNTER — HOSPITAL ENCOUNTER (OUTPATIENT)
Age: 74
Discharge: HOME | End: 2024-03-27
Payer: MEDICARE

## 2024-03-27 ENCOUNTER — HOSPITAL ENCOUNTER (OUTPATIENT)
Dept: GENERAL RADIOLOGY | Age: 74
Discharge: HOME | End: 2024-03-27
Payer: MEDICARE

## 2024-03-27 DIAGNOSIS — N20.0 KIDNEY STONE: ICD-10-CM

## 2024-03-27 PROCEDURE — 74018 RADEX ABDOMEN 1 VIEW: CPT

## 2024-04-11 ENCOUNTER — TELEPHONE (OUTPATIENT)
Dept: WOMENS IMAGING | Age: 74
End: 2024-04-11

## 2024-04-11 NOTE — TELEPHONE ENCOUNTER
Breast Navigators notified of breast imaging follow-up needed from incidental finding on CT.    Spoke to patient.  Asked if one of her doctors had made her aware that a Mammogram was recommended from CT.  Stated that with no PCP on her chart that concern didn't get sent to anyone for follow-up.  Patient states she had silicone injections and refuses to be opened up.  Refuses mammogram at this time.     Dianne Ramon, DIOMEDES, CN-BM  Patient Navigator  [facility]  [phone]

## 2025-04-14 ENCOUNTER — OFFICE VISIT (OUTPATIENT)
Dept: OBGYN CLINIC | Age: 75
End: 2025-04-14
Payer: MEDICARE

## 2025-04-14 VITALS
DIASTOLIC BLOOD PRESSURE: 68 MMHG | WEIGHT: 166 LBS | OXYGEN SATURATION: 99 % | BODY MASS INDEX: 27.62 KG/M2 | SYSTOLIC BLOOD PRESSURE: 132 MMHG | HEART RATE: 88 BPM

## 2025-04-14 DIAGNOSIS — R35.0 URINARY FREQUENCY: Primary | ICD-10-CM

## 2025-04-14 DIAGNOSIS — N76.0 ACUTE VAGINITIS: ICD-10-CM

## 2025-04-14 LAB
BILIRUBIN, POC: NEGATIVE
BLOOD URINE, POC: NEGATIVE
CLARITY, POC: CLEAR
COLOR, POC: NORMAL
GLUCOSE URINE, POC: NEGATIVE MG/DL
KETONES, POC: NEGATIVE MG/DL
LEUKOCYTE EST, POC: NEGATIVE
NITRITE, POC: NEGATIVE
PH, POC: NORMAL
PROTEIN, POC: NEGATIVE MG/DL
SPECIFIC GRAVITY, POC: NORMAL
UROBILINOGEN, POC: NEGATIVE MG/DL

## 2025-04-14 PROCEDURE — 3017F COLORECTAL CA SCREEN DOC REV: CPT | Performed by: OBSTETRICS & GYNECOLOGY

## 2025-04-14 PROCEDURE — G8399 PT W/DXA RESULTS DOCUMENT: HCPCS | Performed by: OBSTETRICS & GYNECOLOGY

## 2025-04-14 PROCEDURE — 1123F ACP DISCUSS/DSCN MKR DOCD: CPT | Performed by: OBSTETRICS & GYNECOLOGY

## 2025-04-14 PROCEDURE — 99203 OFFICE O/P NEW LOW 30 MIN: CPT | Performed by: OBSTETRICS & GYNECOLOGY

## 2025-04-14 PROCEDURE — 1090F PRES/ABSN URINE INCON ASSESS: CPT | Performed by: OBSTETRICS & GYNECOLOGY

## 2025-04-14 PROCEDURE — 81002 URINALYSIS NONAUTO W/O SCOPE: CPT | Performed by: OBSTETRICS & GYNECOLOGY

## 2025-04-14 PROCEDURE — 1036F TOBACCO NON-USER: CPT | Performed by: OBSTETRICS & GYNECOLOGY

## 2025-04-14 PROCEDURE — G8428 CUR MEDS NOT DOCUMENT: HCPCS | Performed by: OBSTETRICS & GYNECOLOGY

## 2025-04-14 PROCEDURE — G8419 CALC BMI OUT NRM PARAM NOF/U: HCPCS | Performed by: OBSTETRICS & GYNECOLOGY

## 2025-04-14 ASSESSMENT — ENCOUNTER SYMPTOMS
CONSTIPATION: 0
ABDOMINAL PAIN: 0
COUGH: 0
SHORTNESS OF BREATH: 0
DIARRHEA: 0
NAUSEA: 0
VOMITING: 0

## 2025-04-14 NOTE — PROGRESS NOTES
[facility] Ob/Gyn   Annual Exam      CC:   Chief Complaint   Patient presents with    Establish Care     Patient states that she may have a UTI or possible infection.       HPI:  74 y.o.  presents for a problem visit. She is c/o vaginal discharge. States she treated herself for a yeast infection about 2 weeks ago. Took OTC Monistat but the discharge has returned.    Health Maintenance:      Review of Systems:   Review of Systems   Constitutional:  Negative for activity change and fatigue.   Respiratory:  Negative for cough and shortness of breath.    Cardiovascular:  Negative for chest pain.   Gastrointestinal:  Negative for abdominal pain, constipation, diarrhea, nausea and vomiting.   Genitourinary:  Positive for frequency. Negative for difficulty urinating, dyspareunia, dysuria, menstrual problem, pelvic pain, vaginal bleeding, vaginal discharge and vaginal pain.   Musculoskeletal:  Negative for joint swelling and myalgias.   Allergic/Immunologic: Negative for environmental allergies.   Neurological:  Negative for dizziness and headaches.        Primary Care Physician: No primary care provider on file.    Obstetric History  OB History    Para Term  AB Living   3 2 2  1    SAB IAB Ectopic Molar Multiple Live Births              # Outcome Date GA Lbr Jay/2nd Weight Sex Type Anes PTL Lv   3 AB            2 Term            1 Term                Gynecologic History  Menstrual History:    Post Menopausal Bleeding: no     Medical History:  Past Medical History:   Diagnosis Date    Acute hemorrhoid     Depression     Elevated blood pressure reading in office with white coat syndrome, without diagnosis of hypertension     Osteoarthritis        Medications:  Current Outpatient Medications   Medication Sig Dispense Refill    calcium carbonate (TUMS) 500 MG chewable tablet Take 1 tablet by mouth daily as needed (Upset Stomach)      bismuth subsalicylate (PEPTO BISMOL) 262 MG chewable tablet Take 2

## 2025-04-15 LAB
BV BACTERIA DNA VAG QL NAA+PROBE: NOT DETECTED
C GLABRATA DNA VAG QL NAA+PROBE: NORMAL
C GLABRATA DNA VAG QL NAA+PROBE: NOT DETECTED
C KRUSEI DNA VAG QL NAA+PROBE: NOT DETECTED
CANDIDA DNA VAG QL NAA+PROBE: NOT DETECTED
T VAGINALIS DNA VAG QL NAA+PROBE: NOT DETECTED

## 2025-04-22 ENCOUNTER — HOSPITAL ENCOUNTER (OUTPATIENT)
Dept: GENERAL RADIOLOGY | Age: 75
Discharge: HOME | End: 2025-04-22
Payer: MEDICARE

## 2025-04-22 ENCOUNTER — HOSPITAL ENCOUNTER (OUTPATIENT)
Age: 75
Discharge: HOME | End: 2025-04-22
Payer: MEDICARE

## 2025-04-22 DIAGNOSIS — N20.0 CALCULUS OF KIDNEY: ICD-10-CM

## 2025-04-22 PROCEDURE — 74018 RADEX ABDOMEN 1 VIEW: CPT

## 2025-05-19 ENCOUNTER — HOSPITAL ENCOUNTER (EMERGENCY)
Age: 75
Discharge: HOME | End: 2025-05-19
Attending: EMERGENCY MEDICINE
Payer: MEDICARE

## 2025-05-19 ENCOUNTER — OFFICE VISIT (OUTPATIENT)
Dept: ORTHOPEDIC SURGERY | Age: 75
End: 2025-05-19
Payer: MEDICARE

## 2025-05-19 ENCOUNTER — TRANSCRIBE ORDERS (OUTPATIENT)
Dept: ADMINISTRATIVE | Age: 75
End: 2025-05-19

## 2025-05-19 ENCOUNTER — APPOINTMENT (OUTPATIENT)
Dept: CT IMAGING | Age: 75
End: 2025-05-19
Payer: MEDICARE

## 2025-05-19 ENCOUNTER — APPOINTMENT (OUTPATIENT)
Dept: GENERAL RADIOLOGY | Age: 75
End: 2025-05-19
Payer: MEDICARE

## 2025-05-19 VITALS — WEIGHT: 166 LBS | HEIGHT: 65 IN | BODY MASS INDEX: 27.66 KG/M2

## 2025-05-19 VITALS
HEART RATE: 78 BPM | BODY MASS INDEX: 28.16 KG/M2 | HEIGHT: 65 IN | SYSTOLIC BLOOD PRESSURE: 153 MMHG | TEMPERATURE: 98.4 F | WEIGHT: 169 LBS | RESPIRATION RATE: 16 BRPM | OXYGEN SATURATION: 98 % | DIASTOLIC BLOOD PRESSURE: 80 MMHG

## 2025-05-19 DIAGNOSIS — M79.89 LEFT LEG SWELLING: ICD-10-CM

## 2025-05-19 DIAGNOSIS — R06.02 SHORTNESS OF BREATH: ICD-10-CM

## 2025-05-19 DIAGNOSIS — N20.0 CALCULUS OF KIDNEY: Primary | ICD-10-CM

## 2025-05-19 DIAGNOSIS — M79.662 PAIN OF LEFT CALF: Primary | ICD-10-CM

## 2025-05-19 DIAGNOSIS — R06.89 DYSPNEA AND RESPIRATORY ABNORMALITIES: Primary | ICD-10-CM

## 2025-05-19 DIAGNOSIS — R06.00 DYSPNEA AND RESPIRATORY ABNORMALITIES: Primary | ICD-10-CM

## 2025-05-19 LAB
ALBUMIN SERPL-MCNC: 4.5 G/DL (ref 3.4–5)
ALBUMIN/GLOB SERPL: 1.8 {RATIO} (ref 1.1–2.2)
ALP SERPL-CCNC: 95 U/L (ref 40–129)
ALT SERPL-CCNC: 25 U/L (ref 10–40)
ANION GAP SERPL CALCULATED.3IONS-SCNC: 11 MMOL/L (ref 3–16)
AST SERPL-CCNC: 24 U/L (ref 15–37)
BASOPHILS # BLD: 0 K/UL (ref 0–0.2)
BASOPHILS NFR BLD: 0.6 %
BILIRUB SERPL-MCNC: 0.5 MG/DL (ref 0–1)
BUN SERPL-MCNC: 23 MG/DL (ref 7–20)
CALCIUM SERPL-MCNC: 10.1 MG/DL (ref 8.3–10.6)
CHLORIDE SERPL-SCNC: 107 MMOL/L (ref 99–110)
CO2 SERPL-SCNC: 25 MMOL/L (ref 21–32)
CREAT SERPL-MCNC: 0.8 MG/DL (ref 0.6–1.2)
DEPRECATED RDW RBC AUTO: 13.7 % (ref 12.4–15.4)
EKG ATRIAL RATE: 69 BPM
EKG DIAGNOSIS: NORMAL
EKG P AXIS: 56 DEGREES
EKG P-R INTERVAL: 152 MS
EKG Q-T INTERVAL: 428 MS
EKG QRS DURATION: 72 MS
EKG QTC CALCULATION (BAZETT): 458 MS
EKG R AXIS: 72 DEGREES
EKG T AXIS: 69 DEGREES
EKG VENTRICULAR RATE: 69 BPM
EOSINOPHIL # BLD: 0.1 K/UL (ref 0–0.6)
EOSINOPHIL NFR BLD: 1.1 %
FLUAV RNA RESP QL NAA+PROBE: NOT DETECTED
FLUBV RNA RESP QL NAA+PROBE: NOT DETECTED
GFR SERPLBLD CREATININE-BSD FMLA CKD-EPI: 78 ML/MIN/{1.73_M2}
GLUCOSE SERPL-MCNC: 109 MG/DL (ref 70–99)
HCT VFR BLD AUTO: 45.5 % (ref 36–48)
HGB BLD-MCNC: 15.4 G/DL (ref 12–16)
LYMPHOCYTES # BLD: 1.2 K/UL (ref 1–5.1)
LYMPHOCYTES NFR BLD: 21.2 %
MCH RBC QN AUTO: 31.7 PG (ref 26–34)
MCHC RBC AUTO-ENTMCNC: 33.8 G/DL (ref 31–36)
MCV RBC AUTO: 93.8 FL (ref 80–100)
MONOCYTES # BLD: 0.3 K/UL (ref 0–1.3)
MONOCYTES NFR BLD: 4.8 %
NEUTROPHILS # BLD: 4 K/UL (ref 1.7–7.7)
NEUTROPHILS NFR BLD: 72.3 %
PLATELET # BLD AUTO: 148 K/UL (ref 135–450)
PMV BLD AUTO: 8.5 FL (ref 5–10.5)
POTASSIUM SERPL-SCNC: 4.1 MMOL/L (ref 3.5–5.1)
PROT SERPL-MCNC: 7 G/DL (ref 6.4–8.2)
RBC # BLD AUTO: 4.86 M/UL (ref 4–5.2)
SARS-COV-2 RNA RESP QL NAA+PROBE: NOT DETECTED
SODIUM SERPL-SCNC: 143 MMOL/L (ref 136–145)
TROPONIN, HIGH SENSITIVITY: 10 NG/L (ref 0–14)
WBC # BLD AUTO: 5.5 K/UL (ref 4–11)

## 2025-05-19 PROCEDURE — 93010 ELECTROCARDIOGRAM REPORT: CPT | Performed by: INTERNAL MEDICINE

## 2025-05-19 PROCEDURE — 85025 COMPLETE CBC W/AUTO DIFF WBC: CPT

## 2025-05-19 PROCEDURE — 3017F COLORECTAL CA SCREEN DOC REV: CPT | Performed by: PHYSICIAN ASSISTANT

## 2025-05-19 PROCEDURE — 1159F MED LIST DOCD IN RCRD: CPT | Performed by: PHYSICIAN ASSISTANT

## 2025-05-19 PROCEDURE — 99203 OFFICE O/P NEW LOW 30 MIN: CPT | Performed by: PHYSICIAN ASSISTANT

## 2025-05-19 PROCEDURE — 93005 ELECTROCARDIOGRAM TRACING: CPT | Performed by: EMERGENCY MEDICINE

## 2025-05-19 PROCEDURE — G8399 PT W/DXA RESULTS DOCUMENT: HCPCS | Performed by: PHYSICIAN ASSISTANT

## 2025-05-19 PROCEDURE — G8427 DOCREV CUR MEDS BY ELIG CLIN: HCPCS | Performed by: PHYSICIAN ASSISTANT

## 2025-05-19 PROCEDURE — 74176 CT ABD & PELVIS W/O CONTRAST: CPT

## 2025-05-19 PROCEDURE — 84484 ASSAY OF TROPONIN QUANT: CPT

## 2025-05-19 PROCEDURE — 99285 EMERGENCY DEPT VISIT HI MDM: CPT

## 2025-05-19 PROCEDURE — 1036F TOBACCO NON-USER: CPT | Performed by: PHYSICIAN ASSISTANT

## 2025-05-19 PROCEDURE — 71260 CT THORAX DX C+: CPT

## 2025-05-19 PROCEDURE — 87636 SARSCOV2 & INF A&B AMP PRB: CPT

## 2025-05-19 PROCEDURE — 73610 X-RAY EXAM OF ANKLE: CPT

## 2025-05-19 PROCEDURE — 1090F PRES/ABSN URINE INCON ASSESS: CPT | Performed by: PHYSICIAN ASSISTANT

## 2025-05-19 PROCEDURE — 6360000004 HC RX CONTRAST MEDICATION: Performed by: EMERGENCY MEDICINE

## 2025-05-19 PROCEDURE — 1123F ACP DISCUSS/DSCN MKR DOCD: CPT | Performed by: PHYSICIAN ASSISTANT

## 2025-05-19 PROCEDURE — 36415 COLL VENOUS BLD VENIPUNCTURE: CPT

## 2025-05-19 PROCEDURE — G8419 CALC BMI OUT NRM PARAM NOF/U: HCPCS | Performed by: PHYSICIAN ASSISTANT

## 2025-05-19 PROCEDURE — 80053 COMPREHEN METABOLIC PANEL: CPT

## 2025-05-19 PROCEDURE — 71045 X-RAY EXAM CHEST 1 VIEW: CPT

## 2025-05-19 RX ORDER — IOPAMIDOL 755 MG/ML
75 INJECTION, SOLUTION INTRAVASCULAR
Status: COMPLETED | OUTPATIENT
Start: 2025-05-19 | End: 2025-05-19

## 2025-05-19 RX ADMIN — IOPAMIDOL 75 ML: 755 INJECTION, SOLUTION INTRAVENOUS at 17:37

## 2025-05-19 ASSESSMENT — LIFESTYLE VARIABLES
HOW MANY STANDARD DRINKS CONTAINING ALCOHOL DO YOU HAVE ON A TYPICAL DAY: PATIENT DOES NOT DRINK
HOW OFTEN DO YOU HAVE A DRINK CONTAINING ALCOHOL: NEVER

## 2025-05-19 NOTE — DISCHARGE INSTRUCTIONS
Your ED provider has ordered you to have a vascular ultrasound to evaluate for a possible DVT.    Central Scheduling is open Monday through Saturday from 8 am to 6 pm. Please call Central Scheduling at [phone] to schedule your Vascular exam.     If you were discharged from the ED after 6pm Sunday through Friday, please contact Central Scheduling at 8 am the following morning. If discharged after 6 pm on Saturday, a Vascular technician will be contacting you Sunday morning to set up your testing.

## 2025-05-19 NOTE — ED PROVIDER NOTES
TempSrc:  Oral     SpO2: 98%  98% 98%   Weight:       Height: 1.651 m (5' 5\")          Patient was treated with and given the following medications:  Medications   iopamidol (ISOVUE-370) 76 % injection 75 mL (75 mLs IntraVENous Given 5/19/25 8142)             Is this patient to be included in the SEP-1 Core Measure due to severe sepsis or septic shock?   No   Exclusion criteria - the patient is NOT to be included for SEP-1 Core Measure due to:  Infection is not suspected    CC/HPI Summary, DDx, ED Course, and Reassessment:     74-year-old female presenting for evaluation of shortness of breath.  Patient was sent in by orthopedic office with concerns for pulmonary embolism.  Patient does have asymmetric leg swelling, left lower extremity swelling.  Will obtain laboratory evaluation, CT chest as well as CT abdomen pelvis for further evaluation.  EKG is nonischemic.  She is mildly hypertensive.    The differential diagnosis associated with the patient's presentation includes, but is not limited to: Pulmonary embolism, DVT, atypical chest pain, pneumothorax, pneumonia, pleurisy    CT of the chest is without acute evidence of pulmonary embolism.  CT abdomen pelvis is unremarkable.    Patient will be ordered outpatient DVT ultrasound for further evaluation of left leg swelling.  As she has no evidence of PE, and she should be able to obtain the duplex fairly quickly as an outpatient basis, will not start the patient on oral anticoagulation,    Discussed anticoagulation with the patient however she prefers not to be on oral anticoagulation at this time.    CONSULTS: (Who and What was discussed)  None    Discussion with Other Professionals : None        Social Determinants : None    Patient's care impacted by chronic condition(s):   Past Medical History:   Diagnosis Date    Acute hemorrhoid     Depression     Elevated blood pressure reading in office with white coat syndrome, without diagnosis of hypertension

## 2025-05-19 NOTE — PROGRESS NOTES
Dr Tino Cote      Date /Time 2025       3:07 PM EDT  Name Itzel Deng             1950   Location  [facility] ORTHO  MRN [id number]                Chief Complaint   Patient presents with    New Patient     N Left Leg        History of Present Illness  Itzel Deng is a 74 y.o. female who presents with  left knee pain, .        Patient presents to the office today for new problem.  Patient here with a chief complaint of left lower leg pain and swelling.  She states approximately 3-4 weeks ago she did get scratched by her cat.  Then 2-3 weeks ago she developed left lower leg pain and swelling.  She has calf pain.  She feels short of breath.    Past History  Past Medical History:   Diagnosis Date    Acute hemorrhoid     Depression     Elevated blood pressure reading in office with white coat syndrome, without diagnosis of hypertension     Osteoarthritis      Past Surgical History:   Procedure Laterality Date    BREAST ENHANCEMENT SURGERY      silicon injected    CT RETROPERITONEAL PERC DRAIN  2021    CT RETROPERITONEAL PERC DRAIN 2021 Chirag No MD [facility] CT SCAN    SMALL INTESTINE SURGERY N/A 2021    EXPLORATORY LAPAROTOMY, SIGMOIDECTOMY WITH ANASTOMOSIS performed by Venkatesh Jhon MD at [facility] OR    TUBAL LIGATION       Social History     Tobacco Use    Smoking status: Former     Current packs/day: 0.00     Average packs/day: 1 pack/day for 10.0 years (10.0 ttl pk-yrs)     Types: Cigarettes     Start date:      Quit date:      Years since quittin.4    Smokeless tobacco: Never   Substance Use Topics    Alcohol use: Not Currently      Current Outpatient Medications on File Prior to Visit   Medication Sig Dispense Refill    tamsulosin (FLOMAX) 0.4 MG capsule Take 1 capsule by mouth daily (Patient not taking: Reported on 2025) 30 capsule 3    calcium carbonate (TUMS) 500 MG chewable tablet Take 1 tablet by mouth daily as needed (Upset Stomach)

## 2025-05-20 ENCOUNTER — TELEPHONE (OUTPATIENT)
Dept: ORTHOPEDIC SURGERY | Age: 75
End: 2025-05-20

## 2025-05-20 NOTE — TELEPHONE ENCOUNTER
----- Message from Alie JUAREZ sent at 5/20/2025 12:06 PM EDT -----  Regarding: Specialty Message to Provider  Specialty Message to Provider    Relationship to Patient: Self     Patient Message: PATIENT WANTS MAUAR TO LOOK AT HER MY CHART. SHE HAD CT SCAN AND OTHER TESTS DONE.  --------------------------------------------------------------------------------------------------------------------------    Call Back Information: OK to leave message on voicemail  Preferred Call Back Number: [phone]

## 2025-06-04 ENCOUNTER — HOSPITAL ENCOUNTER (INPATIENT)
Age: 75
LOS: 3 days | Discharge: HOME | End: 2025-06-07
Attending: EMERGENCY MEDICINE | Admitting: INTERNAL MEDICINE
Payer: MEDICARE

## 2025-06-04 ENCOUNTER — HOSPITAL ENCOUNTER (OUTPATIENT)
Dept: VASCULAR LAB | Age: 75
Discharge: HOME | End: 2025-06-06
Attending: EMERGENCY MEDICINE
Payer: MEDICARE

## 2025-06-04 ENCOUNTER — APPOINTMENT (OUTPATIENT)
Dept: CT IMAGING | Age: 75
End: 2025-06-04
Payer: MEDICARE

## 2025-06-04 ENCOUNTER — TELEPHONE (OUTPATIENT)
Dept: ORTHOPEDIC SURGERY | Age: 75
End: 2025-06-04

## 2025-06-04 ENCOUNTER — APPOINTMENT (OUTPATIENT)
Dept: CT IMAGING | Age: 75
End: 2025-06-04
Attending: EMERGENCY MEDICINE
Payer: MEDICARE

## 2025-06-04 DIAGNOSIS — I82.409 DVT (DEEP VENOUS THROMBOSIS) (HCC): ICD-10-CM

## 2025-06-04 DIAGNOSIS — M79.89 LEFT LEG SWELLING: ICD-10-CM

## 2025-06-04 DIAGNOSIS — I82.4Y2 ACUTE DEEP VEIN THROMBOSIS (DVT) OF PROXIMAL VEIN OF LEFT LOWER EXTREMITY (HCC): Primary | ICD-10-CM

## 2025-06-04 DIAGNOSIS — I82.402 ACUTE DEEP VEIN THROMBOSIS (DVT) OF LEFT LOWER EXTREMITY, UNSPECIFIED VEIN (HCC): ICD-10-CM

## 2025-06-04 LAB
ANION GAP SERPL CALCULATED.3IONS-SCNC: 9 MMOL/L (ref 3–16)
ANTI-XA UNFRAC HEPARIN: 0.77 IU/ML (ref 0.3–0.7)
ANTI-XA UNFRAC HEPARIN: <0.1 IU/ML (ref 0.3–0.7)
APTT BLD: 21.7 SEC (ref 22.1–36.4)
BASOPHILS # BLD: 0 K/UL (ref 0–0.2)
BASOPHILS NFR BLD: 0.7 %
BUN SERPL-MCNC: 13 MG/DL (ref 7–20)
CALCIUM SERPL-MCNC: 9 MG/DL (ref 8.3–10.6)
CANCER AG125 SERPL-ACNC: 9.1 U/ML (ref 0–35)
CHLORIDE SERPL-SCNC: 105 MMOL/L (ref 99–110)
CO2 SERPL-SCNC: 26 MMOL/L (ref 21–32)
CREAT SERPL-MCNC: 0.6 MG/DL (ref 0.6–1.2)
DEPRECATED RDW RBC AUTO: 13.4 % (ref 12.4–15.4)
EKG ATRIAL RATE: 93 BPM
EKG DIAGNOSIS: NORMAL
EKG P AXIS: 46 DEGREES
EKG P-R INTERVAL: 172 MS
EKG Q-T INTERVAL: 412 MS
EKG QRS DURATION: 76 MS
EKG QTC CALCULATION (BAZETT): 512 MS
EKG R AXIS: 58 DEGREES
EKG T AXIS: 52 DEGREES
EKG VENTRICULAR RATE: 93 BPM
EOSINOPHIL # BLD: 0 K/UL (ref 0–0.6)
EOSINOPHIL NFR BLD: 0.6 %
FLUAV RNA RESP QL NAA+PROBE: NOT DETECTED
FLUBV RNA RESP QL NAA+PROBE: NOT DETECTED
GFR SERPLBLD CREATININE-BSD FMLA CKD-EPI: >90 ML/MIN/{1.73_M2}
GLUCOSE SERPL-MCNC: 95 MG/DL (ref 70–99)
HCT VFR BLD AUTO: 45.4 % (ref 36–48)
HGB BLD-MCNC: 15.6 G/DL (ref 12–16)
INR PPP: 1.05 (ref 0.85–1.15)
LYMPHOCYTES # BLD: 0.9 K/UL (ref 1–5.1)
LYMPHOCYTES NFR BLD: 20.2 %
MAGNESIUM SERPL-MCNC: 1.96 MG/DL (ref 1.8–2.4)
MCH RBC QN AUTO: 32 PG (ref 26–34)
MCHC RBC AUTO-ENTMCNC: 34.3 G/DL (ref 31–36)
MCV RBC AUTO: 93.4 FL (ref 80–100)
MONOCYTES # BLD: 0.2 K/UL (ref 0–1.3)
MONOCYTES NFR BLD: 4 %
NEUTROPHILS # BLD: 3.4 K/UL (ref 1.7–7.7)
NEUTROPHILS NFR BLD: 74.5 %
PLATELET # BLD AUTO: 166 K/UL (ref 135–450)
PMV BLD AUTO: 8.9 FL (ref 5–10.5)
POTASSIUM SERPL-SCNC: 3.5 MMOL/L (ref 3.5–5.1)
PROTHROMBIN TIME: 13.9 SEC (ref 11.9–14.9)
RBC # BLD AUTO: 4.86 M/UL (ref 4–5.2)
REASON FOR REJECTION: NORMAL
REASON FOR REJECTION: NORMAL
REJECTED TEST: NORMAL
REJECTED TEST: NORMAL
SARS-COV-2 RNA RESP QL NAA+PROBE: NOT DETECTED
SODIUM SERPL-SCNC: 140 MMOL/L (ref 136–145)
WBC # BLD AUTO: 4.6 K/UL (ref 4–11)

## 2025-06-04 PROCEDURE — 71260 CT THORAX DX C+: CPT

## 2025-06-04 PROCEDURE — 93971 EXTREMITY STUDY: CPT | Performed by: SURGERY

## 2025-06-04 PROCEDURE — 93010 ELECTROCARDIOGRAM REPORT: CPT | Performed by: INTERNAL MEDICINE

## 2025-06-04 PROCEDURE — 85025 COMPLETE CBC W/AUTO DIFF WBC: CPT

## 2025-06-04 PROCEDURE — 83735 ASSAY OF MAGNESIUM: CPT

## 2025-06-04 PROCEDURE — 6370000000 HC RX 637 (ALT 250 FOR IP): Performed by: INTERNAL MEDICINE

## 2025-06-04 PROCEDURE — 87636 SARSCOV2 & INF A&B AMP PRB: CPT

## 2025-06-04 PROCEDURE — 6360000004 HC RX CONTRAST MEDICATION: Performed by: EMERGENCY MEDICINE

## 2025-06-04 PROCEDURE — 6360000004 HC RX CONTRAST MEDICATION: Performed by: SURGERY

## 2025-06-04 PROCEDURE — 80048 BASIC METABOLIC PNL TOTAL CA: CPT

## 2025-06-04 PROCEDURE — 99222 1ST HOSP IP/OBS MODERATE 55: CPT | Performed by: SURGERY

## 2025-06-04 PROCEDURE — 85610 PROTHROMBIN TIME: CPT

## 2025-06-04 PROCEDURE — 93005 ELECTROCARDIOGRAM TRACING: CPT | Performed by: EMERGENCY MEDICINE

## 2025-06-04 PROCEDURE — 2060000000 HC ICU INTERMEDIATE R&B

## 2025-06-04 PROCEDURE — 85730 THROMBOPLASTIN TIME PARTIAL: CPT

## 2025-06-04 PROCEDURE — 93971 EXTREMITY STUDY: CPT

## 2025-06-04 PROCEDURE — 86304 IMMUNOASSAY TUMOR CA 125: CPT

## 2025-06-04 PROCEDURE — 96374 THER/PROPH/DIAG INJ IV PUSH: CPT

## 2025-06-04 PROCEDURE — 36415 COLL VENOUS BLD VENIPUNCTURE: CPT

## 2025-06-04 PROCEDURE — 99285 EMERGENCY DEPT VISIT HI MDM: CPT

## 2025-06-04 PROCEDURE — 6360000002 HC RX W HCPCS: Performed by: EMERGENCY MEDICINE

## 2025-06-04 PROCEDURE — 74174 CTA ABD&PLVS W/CONTRAST: CPT

## 2025-06-04 PROCEDURE — 85520 HEPARIN ASSAY: CPT

## 2025-06-04 RX ORDER — SODIUM CHLORIDE 9 MG/ML
INJECTION, SOLUTION INTRAVENOUS PRN
Status: DISCONTINUED | OUTPATIENT
Start: 2025-06-04 | End: 2025-06-07 | Stop reason: HOSPADM

## 2025-06-04 RX ORDER — POTASSIUM CHLORIDE 1500 MG/1
40 TABLET, EXTENDED RELEASE ORAL PRN
Status: DISCONTINUED | OUTPATIENT
Start: 2025-06-04 | End: 2025-06-07 | Stop reason: HOSPADM

## 2025-06-04 RX ORDER — POTASSIUM CHLORIDE 7.45 MG/ML
10 INJECTION INTRAVENOUS PRN
Status: DISCONTINUED | OUTPATIENT
Start: 2025-06-04 | End: 2025-06-07 | Stop reason: HOSPADM

## 2025-06-04 RX ORDER — MAGNESIUM SULFATE IN WATER 40 MG/ML
2000 INJECTION, SOLUTION INTRAVENOUS PRN
Status: DISCONTINUED | OUTPATIENT
Start: 2025-06-04 | End: 2025-06-07 | Stop reason: HOSPADM

## 2025-06-04 RX ORDER — IOPAMIDOL 755 MG/ML
75 INJECTION, SOLUTION INTRAVASCULAR
Status: COMPLETED | OUTPATIENT
Start: 2025-06-04 | End: 2025-06-04

## 2025-06-04 RX ORDER — PROCHLORPERAZINE EDISYLATE 5 MG/ML
10 INJECTION INTRAMUSCULAR; INTRAVENOUS EVERY 6 HOURS PRN
Status: DISCONTINUED | OUTPATIENT
Start: 2025-06-04 | End: 2025-06-07 | Stop reason: HOSPADM

## 2025-06-04 RX ORDER — HEPARIN SODIUM 10000 [USP'U]/100ML
5-30 INJECTION, SOLUTION INTRAVENOUS CONTINUOUS
Status: DISCONTINUED | OUTPATIENT
Start: 2025-06-04 | End: 2025-06-06

## 2025-06-04 RX ORDER — HEPARIN SODIUM 1000 [USP'U]/ML
80 INJECTION, SOLUTION INTRAVENOUS; SUBCUTANEOUS PRN
Status: DISCONTINUED | OUTPATIENT
Start: 2025-06-04 | End: 2025-06-06

## 2025-06-04 RX ORDER — ACETAMINOPHEN 650 MG/1
650 SUPPOSITORY RECTAL EVERY 6 HOURS PRN
Status: DISCONTINUED | OUTPATIENT
Start: 2025-06-04 | End: 2025-06-07 | Stop reason: HOSPADM

## 2025-06-04 RX ORDER — ACETAMINOPHEN 325 MG/1
650 TABLET ORAL EVERY 6 HOURS PRN
Status: DISCONTINUED | OUTPATIENT
Start: 2025-06-04 | End: 2025-06-07 | Stop reason: HOSPADM

## 2025-06-04 RX ORDER — HEPARIN SODIUM 1000 [USP'U]/ML
40 INJECTION, SOLUTION INTRAVENOUS; SUBCUTANEOUS PRN
Status: DISCONTINUED | OUTPATIENT
Start: 2025-06-04 | End: 2025-06-06

## 2025-06-04 RX ORDER — SODIUM CHLORIDE 0.9 % (FLUSH) 0.9 %
5-40 SYRINGE (ML) INJECTION EVERY 12 HOURS SCHEDULED
Status: DISCONTINUED | OUTPATIENT
Start: 2025-06-04 | End: 2025-06-07 | Stop reason: HOSPADM

## 2025-06-04 RX ORDER — HEPARIN SODIUM 1000 [USP'U]/ML
80 INJECTION, SOLUTION INTRAVENOUS; SUBCUTANEOUS ONCE
Status: COMPLETED | OUTPATIENT
Start: 2025-06-04 | End: 2025-06-04

## 2025-06-04 RX ORDER — POLYETHYLENE GLYCOL 3350 17 G/17G
17 POWDER, FOR SOLUTION ORAL DAILY PRN
Status: DISCONTINUED | OUTPATIENT
Start: 2025-06-04 | End: 2025-06-07 | Stop reason: HOSPADM

## 2025-06-04 RX ORDER — GUAIFENESIN/DEXTROMETHORPHAN 100-10MG/5
5 SYRUP ORAL EVERY 4 HOURS PRN
Status: DISCONTINUED | OUTPATIENT
Start: 2025-06-04 | End: 2025-06-07 | Stop reason: HOSPADM

## 2025-06-04 RX ORDER — SODIUM CHLORIDE 0.9 % (FLUSH) 0.9 %
5-40 SYRINGE (ML) INJECTION PRN
Status: DISCONTINUED | OUTPATIENT
Start: 2025-06-04 | End: 2025-06-07 | Stop reason: HOSPADM

## 2025-06-04 RX ADMIN — HEPARIN SODIUM 18 UNITS/KG/HR: 10000 INJECTION, SOLUTION INTRAVENOUS at 13:22

## 2025-06-04 RX ADMIN — HEPARIN SODIUM 6000 UNITS: 1000 INJECTION INTRAVENOUS; SUBCUTANEOUS at 13:19

## 2025-06-04 RX ADMIN — ACETAMINOPHEN 650 MG: 325 TABLET ORAL at 16:49

## 2025-06-04 RX ADMIN — IOPAMIDOL 75 ML: 755 INJECTION, SOLUTION INTRAVENOUS at 12:29

## 2025-06-04 RX ADMIN — GUAIFENESIN AND DEXTROMETHORPHAN 5 ML: 100; 10 SYRUP ORAL at 17:45

## 2025-06-04 RX ADMIN — IOPAMIDOL 75 ML: 755 INJECTION, SOLUTION INTRAVENOUS at 14:22

## 2025-06-04 ASSESSMENT — LIFESTYLE VARIABLES
HOW OFTEN DO YOU HAVE A DRINK CONTAINING ALCOHOL: NEVER
HOW MANY STANDARD DRINKS CONTAINING ALCOHOL DO YOU HAVE ON A TYPICAL DAY: PATIENT DOES NOT DRINK

## 2025-06-04 ASSESSMENT — PAIN - FUNCTIONAL ASSESSMENT: PAIN_FUNCTIONAL_ASSESSMENT: 0-10

## 2025-06-04 ASSESSMENT — PAIN DESCRIPTION - LOCATION: LOCATION: LEG

## 2025-06-04 ASSESSMENT — PAIN SCALES - GENERAL
PAINLEVEL_OUTOF10: 3
PAINLEVEL_OUTOF10: 7

## 2025-06-04 ASSESSMENT — PAIN DESCRIPTION - ORIENTATION: ORIENTATION: LEFT

## 2025-06-04 NOTE — ED PROVIDER NOTES
Emergency Department Provider Note  Location: [facility] EMERGENCY DEPARTMENT  6/4/2025     Patient Identification  Itzel Deng is a 74 y.o. female    Chief Complaint  Leg Swelling (Pt tested positive for a blood clot this morning at vascular appointment in the L leg.)          HPI  (History provided by patient)  Patient is a 74-year-old female who presents for treatment for DVT that was diagnosed on outpatient DVT study this morning.  Patient reports for the past 2 to 3 weeks she has had unilateral swelling in the left leg and now the entire leg.  She was seen on 19 May in the emergency room and DVT study was ordered outpatient follow-up.  She reports logistical constraints that delayed her from getting the study until today.  DVT study preliminary read with extensive clot burden from the iliac common femoral femoral saphenous and popliteal.  Patient reports that she does feel short of breath.  She thought that she was getting over a cough or \"croup\" but describes exertional dyspnea.  This is new since her last visit to the ER 2 weeks ago.      Nursing Notes were all reviewed and agreed with, or any disagreements were addressed in the HPI:  Allergies:   Allergies   Allergen Reactions    Codeine Other (See Comments)     Ears ringing    Morphine Other (See Comments)     hallucinations       Past medical history:  has a past medical history of Acute hemorrhoid, Depression, Elevated blood pressure reading in office with white coat syndrome, without diagnosis of hypertension, and Osteoarthritis.    Past surgical history:  has a past surgical history that includes Tubal ligation; Breast enhancement surgery (1968); CT PERITONEAL/RETROPERITONEAL PERC DRAIN (2/2/2021); and Small intestine surgery (N/A, 2/18/2021).    Home medications:   Prior to Admission medications    Medication Sig Start Date End Date Taking? Authorizing Provider   tamsulosin (FLOMAX) 0.4 MG capsule Take 1 capsule by mouth daily  Patient not  Impression:  1. Acute deep vein thrombosis (DVT) of proximal vein of left lower extremity (HCC)          Disposition:  Admit to telemetry in stable condition.    Blood pressure (!) 173/82, pulse 76, temperature 98 °F (36.7 °C), temperature source Oral, resp. rate 18, height 1.651 m (5' 5\"), weight 74.8 kg (165 lb), SpO2 98%, not currently breastfeeding.    Patient was given scripts for the following medications. I counseled patient how to take these medications.   New Prescriptions    No medications on file     Modified Medications    No medications on file       Disposition referral (if applicable):  No follow-up provider specified.    I, Ralph Monteiro, am the primary attending of record and contributed the majority of evaluation and treatment of emergent care for this encounter.     Total critical care time is 10 minutes, which excludes separately billable procedures and updating family. Time spent is specifically for management of the presenting complaint and symptoms initially, direct bedside care, reevaluation, review of records, and consultation.  There was a high probability of clinically significant life-threatening deterioration in the patient's condition, which required my urgent intervention.     This chart was generated in part by using Dragon Dictation system and may contain errors related to that system including errors in grammar, punctuation, and spelling, as well as words and phrases that may be inappropriate. If there are any questions or concerns please feel free to contact the dictating provider for clarification.     Ralph Monteiro MD   [employer]        Ralph Monteiro MD  06/04/25 3327

## 2025-06-04 NOTE — H&P
Hospital Medicine History and Physical      Chief Complaint:  weeks of LLE edema      History and Present Illness:  The patient is a pleasant 74 Y F with a h/o former smoking, \"white coat HTN,\" HLD, and nephrolithiases.  To me she also seems like she has a degree of chronic cognitive impairment which usually goes unrecognized by family members.  Her daughter admits that she does usually have \"a little bit of forgetfulness.\"  The patient still is able to live alone, drives, handles her own finances.  She walks with either a cane or walker.    She developed LLE edema and pain a few weeks ago.  She went to the ortho clinic, who recognized this as a probable DVT, and sent her to our ED on 5/19.  The ED ruled out a PE with CTPA, and recommended outpatient venous doppler, which apparently happened this AM.  This was positive for extensive LLE DVTs and she was again sent to the ED.  Vascular surgery evaluated her and made plans for LLE venous thrombectomy the following day.   The patient has never had a blood clot before, and they don't run in her family.  She hasn't had any prolonged immobile periods.  No recent surgeries.  She has had a cough for the last 8 days and we are testing for COVID.  She has not been losing weight, but there have been concerns for malignancy in the past:  - She had some breast nodules and calcifications on CT on 5/19/25.  The patient says that she stopped getting mammograms a long time ago because her breast implants kept causing her to have abnormal mammograms.  I do see that our radiologist felt like the breast nodules looked similar to the CT from 2018, but still recommended correlation with mammography.   - Also, the patient has had a slowly-growing L ovarian complex cyst for years now.  It was 2 cm in 2021, then 3.5 cm in 2/2024, then 4.3 cm on 5/19/25, now 4.7 cm.  When the patient was admitted here in 2/2024 with pyelonephritis they were concerned about this enlarging ovarian cyst.  A  07:34 AM    GLUCOSEU Neg 02/03/2010 08:38 AM    KETUA negative 04/14/2025 12:05 PM    KETUA Negative 02/07/2024 07:34 AM    AMORPHOUS 1+ 07/19/2018 08:17 AM     Urine Cultures:   Lab Results   Component Value Date/Time    LABURIN  02/07/2024 07:34 AM     >50,000 CFU/ml mixed skin/urogenital marisol. No further workup     Blood Cultures:   Lab Results   Component Value Date/Time    BC No Growth after 4 days of incubation. 02/15/2021 08:22 PM     Lab Results   Component Value Date/Time    BLOODCULT2 No Growth after 4 days of incubation. 02/15/2021 08:38 PM     Organism:   Lab Results   Component Value Date/Time    ORG Beta Strep Group F 02/02/2021 12:30 PM       Imaging/Diagnostics Last 24 Hours   Vascular duplex lower extremity venous left  Result Date: 6/4/2025    Extensive acute deep vein thrombosis in the left external iliac vein, common femoral vein, saphenofemoral junction, femoral vein, popliteal vein, and soleal vein.   Foca acute superficial vein thrombosis in the thigh region of the left great saphenous vein.     CT CHEST PULMONARY EMBOLISM W CONTRAST  Result Date: 6/4/2025  CT PULMONARY ANGIOGRAPHY OF THE CHEST History: Chest Pain. Shortness of Breath. Comparison: None PROCEDURE: 100 ml of Omnipaque 350 contrast material injected as a bolus with subsequent thin 1.25 mm sections with MIP rendered reconstructions, reviewed in 3 dimensions on a separate computerized workstation.  Individualized dose optimization technique  was used in order to meet ALARA standards for radiation dose reduction.  In addition to vendor specific dose reduction algorithms, the dose reduction techniques vary based on the specific scanner utilized but frequently include automated exposure control, adjustment of the mA and/or kV according to patient size, and use of iterative reconstruction technique. FINDINGS: The central airways are patent. There are parenchymal bands in the lower lobes, suggestive atelectasis/scar. Triangular shaped

## 2025-06-04 NOTE — CARE COORDINATION
Case Management Assessment  Initial Evaluation    Date/Time of Evaluation: 6/4/2025 3:29 PM  Assessment Completed by: NOÉ Gómez    If patient is discharged prior to next notation, then this note serves as note for discharge by case management.    Patient Name: Itzel Deng                   YOB: 1950  Diagnosis: Acute deep vein thrombosis (DVT) of left lower extremity, unspecified vein (HCC) [I82.402]                   Date / Time: 6/4/2025 11:03 AM    Patient Admission Status: Inpatient   Readmission Risk (Low < 19, Mod (19-27), High > 27): No data recorded  Current PCP: No primary care provider on file.  PCP verified by CM? (P) Yes    Chart Reviewed: Yes      History Provided by: Patient  Patient Orientation: Alert and Oriented    Patient Cognition: Alert    Hospitalization in the last 30 days (Readmission):  No    If yes, Readmission Assessment in CM Navigator will be completed.    Advance Directives:      Code Status: Prior   Patient's Primary Decision Maker is: Legal Next of Kin    Primary Decision Maker: ReyApril [facility] - [phone]    Discharge Planning:    Patient lives with: (P) Alone Type of Home: (P) Apartment  Primary Care Giver: Self  Patient Support Systems include: Children   Current Financial resources: (P) None  Current community resources: (P) ECF/Home Care  Current services prior to admission: (P) Durable Medical Equipment            Current DME: (P) Cane, Walker            Type of Home Care services:  (P) None    ADLS  Prior functional level: (P) Independent in ADLs/IADLs  Current functional level: (P) Assistance with the following:, Shopping, Housework, Cooking    PT AM-PAC:   /24  OT AM-PAC:   /24    Family can provide assistance at DC: (P) Yes  Would you like Case Management to discuss the discharge plan with any other family members/significant others, and if so, who? (P) No  Plans to Return to Present Housing: (P) Yes  Other Identified Issues/Barriers to

## 2025-06-04 NOTE — DISCHARGE INSTRUCTIONS
Follow up with PCP within 1-2 weeks.  Follow up in vascular clinic next week for suture removal.     She needs outpatient mammogram via PCP, gynecology follow up for consideration of ovarian MRI, repeat CT in 6 months via PCP to reassess the lung nodules, and colonoscopy referral via PCP (or she can just schedule with her GI doctor if she has one like she says she does).  Discussed in detail with the patient and her daughter, her daughter demonstrated understanding.

## 2025-06-04 NOTE — PLAN OF CARE
Problem: Pain  Goal: Verbalizes/displays adequate comfort level or baseline comfort level  6/4/2025 1704 by Penny Mckenzie, RN  Outcome: Progressing     Problem: Safety - Adult  Goal: Free from fall injury  6/4/2025 1704 by Penny Mckenzie, RN  Outcome: Progressing  6/4/2025 1703 by Penny Mckenzie, RN  Outcome: Progressing

## 2025-06-04 NOTE — PROGRESS NOTES
Patient arrived on unit from the ED by stretcher.  VSS Patient alert and oriented.  PIV R arm with Heparin infusing 18 units.  Patient oriented to room educated call when needing to get up OOB.  CLWR

## 2025-06-04 NOTE — CONSULTS
Pharmacy to Manage Heparin Infusion per Hospital Nomogram    Dx: acute DVT  Weight: 74.8 kg   Baseline aPTT: ordered    Labs:  Recent Labs     06/04/25  1144   HGB 15.6   HCT 45.4        Heparin (weight-based) Infusion: VTE/DVT/PE  Will initiate heparin therapy with a bolus of 6000 units (80 units/kg) followed by heparin infusion at 18 units/kg/hr.  Plan to check anti-Xa in 6 hours.  Goal anti-Xa 0.3 - 0.7 units/mL    Thank you for the consult!   Ariela Cortes, PharmD, [facility] [id number]      6/4 @ 2017  Anti-Xa 0.77 at 1918  Decrease Heparin infusion to 17 units/kg/hr  Recheck anti-Xa in 6 hours.  Coy Vogel PharmD 6/4/2025 8:18 PM    -----------------------------  6/5 0319  High-Dose Heparin Drip  Current Rate: 17 units/kg/hr  6/5 @ 0233 Anti-Xa Level= 0.69  Plan: Per pharmacy dosing, we will not make any changes at this time    Next Anti-Xa Level: 6/5 @ 0830  Nia Lyles PharmD 6/5/2025 3:19 AM    6/5 0754  Anti Xa 0.79 IU/mL  Decrease heparin drip rate to 16 units/kg/hr  Recheck Anti Xa level in 6 hours at 1500  Homero Carl PharmD, BCPS  6/5/2025 at 8:43 AM    -------------------------------------  6/6 0045  High-Dose Heparin Drip  Current Rate: 16 units/kg/hr  6/5 @ 2335 Anti-Xa Level= 0.55  Plan: Per pharmacy dosing, we will not make any changes at this time    Next Anti-Xa Level: 6/6 @ 0600  Nia Lyles PharmD 6/6/2025 12:47 AM    ------------------------------------------  6/6 0720  High-Dose Heparin Drip  Current Rate: 16 units/kg/hr  6/6 @ 0624 Anti-Xa Level= 0.48  Plan: Per pharmacy dosing, we will not make any changes at this time and move to daily Anti-Xa level monitoring    Next Anti-Xa Level: 6/7 @ 0600  Nia Lyles, PharmD 6/6/2025 7:20 AM

## 2025-06-04 NOTE — TELEPHONE ENCOUNTER
Positive DVT     Called James @ [facility]    He did not order test / was sent to ER 5/19 for evaluation      Need to get in touch with PCP or back to ER for management ... JL      Patient chose to return to the ER    jm

## 2025-06-04 NOTE — ED NOTES
@3854 called  Vascular Surgery per Ralph Monteiro MD   RE:  DVT large clot burden  @5280 second page

## 2025-06-04 NOTE — FLOWSHEET NOTE
Patient resting in bed CLWR    06/04/25 1631   Assessment   Charting Type Admission   Psychosocial   Psychosocial (WDL) WDL   Neurological   Level of Consciousness 0   Orientation Level Oriented X4   Cognition Appropriate judgement;Appropriate safety awareness;Follows commands   Speech Clear   R Hand  Moderate   L Hand  Moderate   R Foot Dorsiflexion Moderate   L Foot Dorsiflexion Moderate   R Foot Plantar Flexion Moderate   L Foot Plantar Flexion Moderate   RUE Motor Response Responds to command;Normal flexion   RUE Sensation  Full sensation;No numbness;No pain;No tingling   LUE Motor Response Normal flexion;Responds to command   LUE Sensation  Full sensation;No numbness;No pain;No tingling   RLE Motor Response Normal flexion;Responds to command   RLE Sensation  Full sensation;No numbness;No pain;No tingling   LLE Motor Response Responds to command   LLE Sensation  Pain;Numbness;Tingling   Tongue Deviation None   Gag Present   Neuro (WDL) WDL   Swallow Screening   Is the patient unable to remain alert for testing? No   Is the patient on a modified diet (thickened liquids) due to pre-existing dysphagia? No   Is there presence of existing enteral tube feeding via the stomach or nose? No   Is there presence of head-of-bed restrictions (less than 30 degrees)? No   Is there presence of tracheotomy tube? No   Is the patient ordered nothing-by-mouth status? No   3 oz Water Swallow Screen Pass   Emilee Coma Scale   Eye Opening 4   Best Verbal Response 5   Best Motor Response 6   Emilee Coma Scale Score 15   NIH Stroke Scale   NIH Stroke Scale Assessed No   HEENT (Head, Ears, Eyes, Nose, & Throat)   HEENT (WDL) X   Right Eye Glasses;Impaired vision   Left Eye Glasses;Impaired vision   Lips Dry   Respiratory   Respiratory Pattern Regular   Respiratory Depth Normal   Respiratory Quality/Effort Unlabored   Chest Assessment Chest expansion symmetrical;Trachea midline   L Breath Sounds Diminished   R Breath Sounds

## 2025-06-04 NOTE — ED NOTES
Itzel Deng is a 74 y.o. female admitted for  Principal Problem:    DVT (deep venous thrombosis) (HCC)  Active Problems:    Acute deep vein thrombosis (DVT) of left lower extremity, unspecified vein (HCC)  Resolved Problems:    * No resolved hospital problems. *  .   Patient Home via self with   Chief Complaint   Patient presents with    Leg Swelling     Pt tested positive for a blood clot this morning at vascular appointment in the L leg.   .  Patient is alert and Person, Place, Time, and Situation  Patient's baseline mobility: Baseline Mobility: Cane   Code Status: Prior   Cardiac Rhythm:       Is patient on baseline Oxygen: no how many Liters:   Abnormal Assessment Findings: Edema +1 to LLE, pedal pulses palpable. Multiple DVTs found in LLE, on heparin drip per vascular. Patient difficult venous stick by multiple nurses, has 1 PIV    Isolation: None      NIH Score:    C-SSRS: Risk of Suicide: No Risk  Bedside swallow:        Active LDA's:   Peripheral IV 06/04/25 Right Antecubital (Active)           Family/Caregiver Present no Any Concerns: no   Restraints no  Sitter no         Vitals: MEWS Score: 1    Vitals:    06/04/25 1109 06/04/25 1114 06/04/25 1324 06/04/25 1355   BP: (!) 173/82  (!) 145/93 (!) 163/77   Pulse: 76  77 77   Resp: 18      Temp: 98 °F (36.7 °C)      TempSrc: Oral      SpO2: 98%  100% 100%   Weight:  74.8 kg (165 lb)     Height:  1.651 m (5' 5\")         Last documented pain score (0-10 scale) Pain Level: 7  Pain medication administered No.    Pertinent or High Risk Medications/Drips: No.    Pending Blood Product Administration: no    Abnormal labs:   Abnormal Labs Reviewed   CBC WITH AUTO DIFFERENTIAL - Abnormal; Notable for the following components:       Result Value    Lymphocytes Absolute 0.9 (*)     All other components within normal limits   APTT - Abnormal; Notable for the following components:    aPTT 21.7 (*)     All other components within normal limits   ANTI-XA, HEPARIN -  Abnormal; Notable for the following components:    Anti-XA Unfrac Heparin <0.10 (*)     All other components within normal limits     Critical values: no  Intervention for critical value(s):     Abnormal Imaging: yes,  CT scan and Ultrasound            You may also review the ED PT Care Timeline found under the Summary Tab, ED Encounter Summary, Timeline Reports, ED Patient Care Timeline.     Recommendation    Pending orders/Uncompleted orders to hand off:  Repeat BMP sent to lab since specimen was rejected twice, in process.    Additional Comments:   If any further questions, please call Sending RN at [phone]

## 2025-06-04 NOTE — CONSULTS
[facility]           Vascular Surgery Consultation  Itzel Deng  Medical Record #: [id number]  YOB: 1950      Date of Admission:  6/4/2025 11:03 AM  Date of Consultation:  6/4/2025      PCP:  No primary care provider on file.       Chief Complaint: worsening left leg swelling    History of Present Illness:   We are asked to see this patient in consultation by TABBY Loera MD  regarding large DVT clot burden.    Itzel Deng is a 74 y.o. female who initially went to see  NATALIYA Gonzalez at [facility] with left lower extremity pain and swelling. This started about 2-3 weeks prior to seeking healthcare. At that time, he felt it was suspicious for DVT by clinical exam and was instructed to go the ED. At that time, CT of chest was negative for PE and CT of abdomen/pelvis was unremarkable. An outpatient venous duplex was ordered by the ED physician and she was discharged home. Patient states she called to make and appointment and was given today's date as her follow up outpatient duplex. Venous duplex showed a left lower extremity DVT and was brought to the ED for further evaluation and treatment. Vascular surgery was consulted.    Past Medical History:   Diagnosis Date    Acute hemorrhoid     Depression     Elevated blood pressure reading in office with white coat syndrome, without diagnosis of hypertension     Osteoarthritis    Gross hematuria 2/7  Left renal pelvis stone  Distal left ureteal stone - s/pmedical explosive therapy  Intra-abdominal abscess of sigmoid colon 2/2021    Past Surgical History:   Procedure Laterality Date    BREAST ENHANCEMENT SURGERY  1968    silicon injected    CT PERITONEAL/RETROPERITONEAL PERC DRAIN  2/2/2021    CT RETROPERITONEAL PERC DRAIN 2/2/2021 Chirag No MD [facility] CT SCAN    SMALL INTESTINE SURGERY N/A 2/18/2021    EXPLORATORY LAPAROTOMY, SIGMOIDECTOMY WITH ANASTOMOSIS performed by Venkatesh John MD at [facility] OR    TUBAL

## 2025-06-05 LAB
ANION GAP SERPL CALCULATED.3IONS-SCNC: 10 MMOL/L (ref 3–16)
ANTI-XA UNFRAC HEPARIN: 0.61 IU/ML (ref 0.3–0.7)
ANTI-XA UNFRAC HEPARIN: 0.69 IU/ML (ref 0.3–0.7)
ANTI-XA UNFRAC HEPARIN: 0.79 IU/ML (ref 0.3–0.7)
BUN SERPL-MCNC: 13 MG/DL (ref 7–20)
CALCIUM SERPL-MCNC: 9 MG/DL (ref 8.3–10.6)
CHLORIDE SERPL-SCNC: 110 MMOL/L (ref 99–110)
CO2 SERPL-SCNC: 23 MMOL/L (ref 21–32)
CREAT SERPL-MCNC: 0.7 MG/DL (ref 0.6–1.2)
DEPRECATED RDW RBC AUTO: 13.2 % (ref 12.4–15.4)
ECHO BSA: 1.85 M2
GFR SERPLBLD CREATININE-BSD FMLA CKD-EPI: >90 ML/MIN/{1.73_M2}
GLUCOSE BLD-MCNC: 104 MG/DL (ref 70–99)
GLUCOSE SERPL-MCNC: 104 MG/DL (ref 70–99)
HCT VFR BLD AUTO: 40.8 % (ref 36–48)
HGB BLD-MCNC: 13.8 G/DL (ref 12–16)
MCH RBC QN AUTO: 31.5 PG (ref 26–34)
MCHC RBC AUTO-ENTMCNC: 33.9 G/DL (ref 31–36)
MCV RBC AUTO: 92.9 FL (ref 80–100)
PERFORMED ON: ABNORMAL
PLATELET # BLD AUTO: 153 K/UL (ref 135–450)
PMV BLD AUTO: 8.6 FL (ref 5–10.5)
POTASSIUM SERPL-SCNC: 4.2 MMOL/L (ref 3.5–5.1)
RBC # BLD AUTO: 4.39 M/UL (ref 4–5.2)
SODIUM SERPL-SCNC: 143 MMOL/L (ref 136–145)
WBC # BLD AUTO: 3.9 K/UL (ref 4–11)

## 2025-06-05 PROCEDURE — 36415 COLL VENOUS BLD VENIPUNCTURE: CPT

## 2025-06-05 PROCEDURE — 6360000004 HC RX CONTRAST MEDICATION: Performed by: SURGERY

## 2025-06-05 PROCEDURE — 7100000010 HC PHASE II RECOVERY - FIRST 15 MIN: Performed by: SURGERY

## 2025-06-05 PROCEDURE — 06CD3ZZ EXTIRPATION OF MATTER FROM LEFT COMMON ILIAC VEIN, PERCUTANEOUS APPROACH: ICD-10-PCS | Performed by: SURGERY

## 2025-06-05 PROCEDURE — 99152 MOD SED SAME PHYS/QHP 5/>YRS: CPT | Performed by: SURGERY

## 2025-06-05 PROCEDURE — 2060000000 HC ICU INTERMEDIATE R&B

## 2025-06-05 PROCEDURE — 99153 MOD SED SAME PHYS/QHP EA: CPT | Performed by: SURGERY

## 2025-06-05 PROCEDURE — C1769 GUIDE WIRE: HCPCS | Performed by: SURGERY

## 2025-06-05 PROCEDURE — 6360000002 HC RX W HCPCS: Performed by: INTERNAL MEDICINE

## 2025-06-05 PROCEDURE — 06CN3ZZ EXTIRPATION OF MATTER FROM LEFT FEMORAL VEIN, PERCUTANEOUS APPROACH: ICD-10-PCS | Performed by: SURGERY

## 2025-06-05 PROCEDURE — B51C1ZZ FLUOROSCOPY OF LEFT LOWER EXTREMITY VEINS USING LOW OSMOLAR CONTRAST: ICD-10-PCS | Performed by: SURGERY

## 2025-06-05 PROCEDURE — 80048 BASIC METABOLIC PNL TOTAL CA: CPT

## 2025-06-05 PROCEDURE — 36005 INJECTION EXT VENOGRAPHY: CPT | Performed by: SURGERY

## 2025-06-05 PROCEDURE — C1757 CATH, THROMBECTOMY/EMBOLECT: HCPCS | Performed by: SURGERY

## 2025-06-05 PROCEDURE — 6370000000 HC RX 637 (ALT 250 FOR IP): Performed by: INTERNAL MEDICINE

## 2025-06-05 PROCEDURE — 75820 VEIN X-RAY ARM/LEG: CPT | Performed by: SURGERY

## 2025-06-05 PROCEDURE — 76937 US GUIDE VASCULAR ACCESS: CPT | Performed by: SURGERY

## 2025-06-05 PROCEDURE — 7100000011 HC PHASE II RECOVERY - ADDTL 15 MIN: Performed by: SURGERY

## 2025-06-05 PROCEDURE — 37187 VENOUS MECH THROMBECTOMY: CPT | Performed by: SURGERY

## 2025-06-05 PROCEDURE — C1887 CATHETER, GUIDING: HCPCS | Performed by: SURGERY

## 2025-06-05 PROCEDURE — 75774 ARTERY X-RAY EACH VESSEL: CPT | Performed by: SURGERY

## 2025-06-05 PROCEDURE — 2709999900 HC NON-CHARGEABLE SUPPLY: Performed by: SURGERY

## 2025-06-05 PROCEDURE — C1894 INTRO/SHEATH, NON-LASER: HCPCS | Performed by: SURGERY

## 2025-06-05 PROCEDURE — 06CG3ZZ EXTIRPATION OF MATTER FROM LEFT EXTERNAL ILIAC VEIN, PERCUTANEOUS APPROACH: ICD-10-PCS | Performed by: SURGERY

## 2025-06-05 PROCEDURE — 6360000002 HC RX W HCPCS: Performed by: SURGERY

## 2025-06-05 PROCEDURE — 85027 COMPLETE CBC AUTOMATED: CPT

## 2025-06-05 PROCEDURE — 85520 HEPARIN ASSAY: CPT

## 2025-06-05 RX ORDER — MIDAZOLAM 1 MG/ML
INJECTION INTRAMUSCULAR; INTRAVENOUS PRN
Status: DISCONTINUED | OUTPATIENT
Start: 2025-06-05 | End: 2025-06-05 | Stop reason: HOSPADM

## 2025-06-05 RX ORDER — MIDAZOLAM HYDROCHLORIDE 1 MG/ML
INJECTION, SOLUTION INTRAMUSCULAR; INTRAVENOUS PRN
Status: DISCONTINUED | OUTPATIENT
Start: 2025-06-05 | End: 2025-06-05 | Stop reason: HOSPADM

## 2025-06-05 RX ORDER — FENTANYL CITRATE 50 UG/ML
INJECTION, SOLUTION INTRAMUSCULAR; INTRAVENOUS PRN
Status: DISCONTINUED | OUTPATIENT
Start: 2025-06-05 | End: 2025-06-05 | Stop reason: HOSPADM

## 2025-06-05 RX ORDER — IOPAMIDOL 612 MG/ML
INJECTION, SOLUTION INTRAVASCULAR PRN
Status: DISCONTINUED | OUTPATIENT
Start: 2025-06-05 | End: 2025-06-05 | Stop reason: HOSPADM

## 2025-06-05 RX ADMIN — HEPARIN SODIUM 17 UNITS/KG/HR: 10000 INJECTION, SOLUTION INTRAVENOUS at 06:27

## 2025-06-05 RX ADMIN — ACETAMINOPHEN 650 MG: 325 TABLET ORAL at 20:01

## 2025-06-05 RX ADMIN — ACETAMINOPHEN 650 MG: 325 TABLET ORAL at 03:08

## 2025-06-05 ASSESSMENT — PAIN SCALES - GENERAL
PAINLEVEL_OUTOF10: 0
PAINLEVEL_OUTOF10: 0
PAINLEVEL_OUTOF10: 5
PAINLEVEL_OUTOF10: 5
PAINLEVEL_OUTOF10: 0

## 2025-06-05 ASSESSMENT — PAIN DESCRIPTION - DESCRIPTORS: DESCRIPTORS: ACHING;DISCOMFORT

## 2025-06-05 ASSESSMENT — PAIN DESCRIPTION - PAIN TYPE: TYPE: SURGICAL PAIN

## 2025-06-05 ASSESSMENT — PAIN DESCRIPTION - ORIENTATION
ORIENTATION: LEFT
ORIENTATION: RIGHT;LEFT

## 2025-06-05 ASSESSMENT — PAIN DESCRIPTION - LOCATION
LOCATION: LEG
LOCATION: HIP

## 2025-06-06 LAB
ANION GAP SERPL CALCULATED.3IONS-SCNC: 10 MMOL/L (ref 3–16)
ANTI-XA UNFRAC HEPARIN: 0.48 IU/ML (ref 0.3–0.7)
ANTI-XA UNFRAC HEPARIN: 0.55 IU/ML (ref 0.3–0.7)
BUN SERPL-MCNC: 9 MG/DL (ref 7–20)
CALCIUM SERPL-MCNC: 9.2 MG/DL (ref 8.3–10.6)
CHLORIDE SERPL-SCNC: 109 MMOL/L (ref 99–110)
CO2 SERPL-SCNC: 24 MMOL/L (ref 21–32)
CREAT SERPL-MCNC: 0.6 MG/DL (ref 0.6–1.2)
DEPRECATED RDW RBC AUTO: 13.3 % (ref 12.4–15.4)
GFR SERPLBLD CREATININE-BSD FMLA CKD-EPI: >90 ML/MIN/{1.73_M2}
GLUCOSE SERPL-MCNC: 98 MG/DL (ref 70–99)
HCT VFR BLD AUTO: 42.3 % (ref 36–48)
HGB BLD-MCNC: 14.3 G/DL (ref 12–16)
MCH RBC QN AUTO: 31.5 PG (ref 26–34)
MCHC RBC AUTO-ENTMCNC: 33.8 G/DL (ref 31–36)
MCV RBC AUTO: 93 FL (ref 80–100)
PLATELET # BLD AUTO: 145 K/UL (ref 135–450)
PMV BLD AUTO: 8.8 FL (ref 5–10.5)
POTASSIUM SERPL-SCNC: 3.7 MMOL/L (ref 3.5–5.1)
RBC # BLD AUTO: 4.55 M/UL (ref 4–5.2)
SODIUM SERPL-SCNC: 143 MMOL/L (ref 136–145)
WBC # BLD AUTO: 4.7 K/UL (ref 4–11)

## 2025-06-06 PROCEDURE — 85027 COMPLETE CBC AUTOMATED: CPT

## 2025-06-06 PROCEDURE — 2060000000 HC ICU INTERMEDIATE R&B

## 2025-06-06 PROCEDURE — 36415 COLL VENOUS BLD VENIPUNCTURE: CPT

## 2025-06-06 PROCEDURE — 2500000003 HC RX 250 WO HCPCS: Performed by: INTERNAL MEDICINE

## 2025-06-06 PROCEDURE — 97110 THERAPEUTIC EXERCISES: CPT

## 2025-06-06 PROCEDURE — 6370000000 HC RX 637 (ALT 250 FOR IP): Performed by: INTERNAL MEDICINE

## 2025-06-06 PROCEDURE — 6360000002 HC RX W HCPCS: Performed by: INTERNAL MEDICINE

## 2025-06-06 PROCEDURE — 97530 THERAPEUTIC ACTIVITIES: CPT

## 2025-06-06 PROCEDURE — 85520 HEPARIN ASSAY: CPT

## 2025-06-06 PROCEDURE — 80048 BASIC METABOLIC PNL TOTAL CA: CPT

## 2025-06-06 PROCEDURE — 99024 POSTOP FOLLOW-UP VISIT: CPT | Performed by: CLINICAL NURSE SPECIALIST

## 2025-06-06 PROCEDURE — 97166 OT EVAL MOD COMPLEX 45 MIN: CPT

## 2025-06-06 RX ADMIN — ACETAMINOPHEN 650 MG: 325 TABLET ORAL at 16:58

## 2025-06-06 RX ADMIN — APIXABAN 10 MG: 5 TABLET, FILM COATED ORAL at 14:17

## 2025-06-06 RX ADMIN — SODIUM CHLORIDE, PRESERVATIVE FREE 10 ML: 5 INJECTION INTRAVENOUS at 21:24

## 2025-06-06 RX ADMIN — ACETAMINOPHEN 650 MG: 325 TABLET ORAL at 04:30

## 2025-06-06 RX ADMIN — ACETAMINOPHEN 650 MG: 325 TABLET ORAL at 23:57

## 2025-06-06 RX ADMIN — HEPARIN SODIUM 16 UNITS/KG/HR: 10000 INJECTION, SOLUTION INTRAVENOUS at 07:39

## 2025-06-06 RX ADMIN — ACETAMINOPHEN 650 MG: 325 TABLET ORAL at 10:04

## 2025-06-06 RX ADMIN — SODIUM CHLORIDE, PRESERVATIVE FREE 10 ML: 5 INJECTION INTRAVENOUS at 09:58

## 2025-06-06 RX ADMIN — APIXABAN 10 MG: 5 TABLET, FILM COATED ORAL at 21:23

## 2025-06-06 ASSESSMENT — PAIN SCALES - GENERAL
PAINLEVEL_OUTOF10: 0
PAINLEVEL_OUTOF10: 3
PAINLEVEL_OUTOF10: 2
PAINLEVEL_OUTOF10: 7
PAINLEVEL_OUTOF10: 3
PAINLEVEL_OUTOF10: 3

## 2025-06-06 ASSESSMENT — PAIN DESCRIPTION - DESCRIPTORS
DESCRIPTORS: OTHER (COMMENT)
DESCRIPTORS: ACHING
DESCRIPTORS: ACHING;SORE
DESCRIPTORS: ACHING
DESCRIPTORS: OTHER (COMMENT)

## 2025-06-06 ASSESSMENT — PAIN DESCRIPTION - PAIN TYPE
TYPE: SURGICAL PAIN

## 2025-06-06 ASSESSMENT — PAIN DESCRIPTION - ORIENTATION
ORIENTATION: LEFT

## 2025-06-06 ASSESSMENT — PAIN DESCRIPTION - LOCATION
LOCATION: KNEE;LEG
LOCATION: LEG
LOCATION: KNEE
LOCATION: LEG;KNEE
LOCATION: LEG;KNEE;ANKLE;HIP

## 2025-06-06 NOTE — PROGRESS NOTES
Occupational Therapy  [address]  Occupational Therapy Initial/discharge Assessment    Name: Itzel Deng  : 1950  MRN: [id number]  Date of Service: 2025    Discharge Recommendations:  Home with assist PRN (for homemaking assist per pt)          Patient Diagnosis(es): The primary encounter diagnosis was Acute deep vein thrombosis (DVT) of proximal vein of left lower extremity (HCC). A diagnosis of DVT (deep venous thrombosis) (HCC) was also pertinent to this visit.  Past Medical History:  has a past medical history of Acute hemorrhoid, Deep vein thrombosis (HCC), Depression, Elevated blood pressure reading in office with white coat syndrome, without diagnosis of hypertension, and Osteoarthritis.  Past Surgical History:  has a past surgical history that includes Tubal ligation; Breast enhancement surgery (); CT PERITONEAL/RETROPERITONEAL PERC DRAIN (2021); Small intestine surgery (N/A, 2021); and invasive vascular (N/A, 2025).           Assessment  Assessment: pt from home alone in Senior Independent Living apt, normally independent with BADL's & functional mobility with canes, or 4 WW; admitted with Left LE DVT s/p Left Lower Extremity Venous Thrombectomy ; pt supervision with bathroom/functional mobility in room with canes x 2, standing ADL's & modified independent with bed mobility & sit<-->stand from chair;  REQUIRES OT FOLLOW-UP: No  Activity Tolerance  Activity Tolerance: Patient Tolerated treatment well  Activity Tolerance Comments: vitals stable on RA; sitting in chair after standing ADL's/bathroom mobility: BP = 142/77, HR 79, 97 % O 2 sats;     Plan       Restrictions  Restrictions/Precautions  Restrictions/Precautions: Fall Risk, General Precautions  Position Activity Restriction  Other Position/Activity Restrictions: telemetry, IV    Subjective  General  Chart Reviewed: Yes  Patient assessed for rehabilitation services?: Yes  Family /  mobility with AD; 6/06 STG met  Short Term Goal 2: supervison standing ADL's; 6/06 STG met  Short Term Goal 3: modified independent with functional transfers; 6/06 STG met  Patient Goals   Patient goals : \"home tomorrow?\"      Therapy Time   Individual Concurrent Group Co-treatment   Time In 1535         Time Out 1616         Minutes 41                 Taylor Lacey, OT

## 2025-06-06 NOTE — PROGRESS NOTES
BP (!) 149/62   Pulse 65   Temp 97.9 °F (36.6 °C) (Oral)   Resp 20   Ht 1.651 m (5' 5\")   Wt 70.6 kg (155 lb 10.3 oz)   SpO2 97%   BMI 25.90 kg/m²  on room air.  Pt up to bathroom, returned to bed, pt did well for first time up x2 assist, with assistive devices.  Pt with complaints of left leg/knee pain currently \"7\", prn tylenol given per pt request, ice pack applied.  BLE elevated off bed onto pillows/foot of bed elevated as well with bar propped up.  Incision intact posterior popliteal.  Heparin drip infusing at ordered rate on MAR 16 units/kg/hr.  Pt assisted with repositioning in bed.  Pt denies any other needs at this time.  Bedside table, call light, fluids within reach.  Bed alarm activated on bed.  Pt instructed to call out for any needs and assistance.  Nancy Hernández RN  6/6/2025

## 2025-06-06 NOTE — PROGRESS NOTES
Vascular Surgery Progress Note      POD#  1  S/P Left Lower Extremity Venous Thrombectomy    Chief Complaint: Postop follow up      SUBJECTIVE:  States her left leg feels better    OBJECTIVE    Physical  CURRENT VITALS:  /78   Pulse 66   Temp 98 °F (36.7 °C) (Oral)   Resp 20   Ht 1.651 m (5' 5\")   Wt 70.6 kg (155 lb 10.3 oz)   SpO2 97%   BMI 25.90 kg/m²   24 HR INTAKE/OUTPUT:    Intake/Output Summary (Last 24 hours) at 6/6/2025 0653  Last data filed at 6/6/2025 0424  Gross per 24 hour   Intake 1135.23 ml   Output 130 ml   Net 1005.23 ml     Left lower extremity ace wrap intact, removed  Left popliteal access site soft, clean dry and intact with Tegaderm  Left lower extremity warm, edema improving      Data  CBC:   Recent Labs     06/04/25  1144 06/05/25  0546   WBC 4.6 3.9*   HGB 15.6 13.8   HCT 45.4 40.8   MCV 93.4 92.9    153     BMP:   Recent Labs     06/04/25  1436 06/05/25  0546    143   K 3.5 4.2    110   CO2 26 23   GLUCOSE 95 104*   BUN 13 13   CREATININE 0.6 0.7   CALCIUM 9.0 9.0   MG 1.96  --      Accucheck Glucoses:   Recent Labs     06/05/25  1757   POCGLU 104*     PT/INR:   Recent Labs     06/04/25  1256   PROTIME 13.9   INR 1.05     APTT:   Recent Labs     06/04/25  1256   APTT 21.7*       Current Inpatient Medications  Current Facility-Administered Medications: heparin (porcine) injection 6,000 Units, 80 Units/kg, IntraVENous, PRN  heparin (porcine) injection 3,000 Units, 40 Units/kg, IntraVENous, PRN  heparin 25,000 units in dextrose 5% 250 mL (premix) infusion, 5-30 Units/kg/hr, IntraVENous, Continuous  sodium chloride flush 0.9 % injection 5-40 mL, 5-40 mL, IntraVENous, 2 times per day  sodium chloride flush 0.9 % injection 5-40 mL, 5-40 mL, IntraVENous, PRN  0.9 % sodium chloride infusion, , IntraVENous, PRN  potassium chloride (KLOR-CON M) extended release tablet 40 mEq, 40 mEq, Oral, PRN **OR** potassium bicarb-citric acid (EFFER-K) effervescent tablet 40 mEq,

## 2025-06-06 NOTE — DISCHARGE INSTR - COC
Continuity of Care Form    Patient Name: Itzel Deng   :  1950  MRN:  [id number]    Admit date:  2025  Discharge date:  ***    Code Status Order: Full Code   Advance Directives:     Admitting Physician:  Wilfrido Cheng MD  PCP: No primary care provider on file.    Discharging Nurse: ***  Discharging Hospital Unit/Room#: 0443/0443-01  Discharging Unit Phone Number: ***    Emergency Contact:   Extended Emergency Contact Information  Primary Emergency Contact: Le,April  Address: [address] [place] [address]  Home Phone: [phone]  Mobile Phone: [phone]  Relation: Child    Past Surgical History:  Past Surgical History:   Procedure Laterality Date    BREAST ENHANCEMENT SURGERY      silicon injected    CT PERITONEAL/RETROPERITONEAL PERC DRAIN  2021    CT RETROPERITONEAL PERC DRAIN 2021 Chirag No MD [facility] CT SCAN    INVASIVE VASCULAR N/A 2025    Thrombectomy peripheral vein performed by Trevor May MD at [facility] CARDIAC CATH LAB    SMALL INTESTINE SURGERY N/A 2021    EXPLORATORY LAPAROTOMY, SIGMOIDECTOMY WITH ANASTOMOSIS performed by Venkatesh John MD at [facility] OR    TUBAL LIGATION         Immunization History:   Immunization History   Administered Date(s) Administered    Influenza, FLUZONE High Dose (age 65 y+), IM, Quadv, 0.7mL 2020    PPD Test 2021    Pneumococcal, PCV-13, PREVNAR 13, (age 6w+), IM, 0.5mL 08/10/2017    Pneumococcal, PPSV23, PNEUMOVAX 23, (age 2y+), SC/IM, 0.5mL 2020    TDaP, ADACEL (age 10y-64y), BOOSTRIX (age 10y+), IM, 0.5mL 08/10/2017       Active Problems:  Patient Active Problem List   Diagnosis Code    Primary osteoarthritis of both hips M16.0    Lung nodule seen on imaging study R91.1    Neck pain M54.2    Mixed hyperlipidemia E78.2    White coat syndrome without diagnosis of hypertension R03.0    Osteopenia of multiple sites M85.89    Family history of breast cancer in  nodules, and colonoscopy referral via PCP (or she can just schedule with her GI doctor if she has one like she says she does).  Discussed in detail with the patient and her daughter, her daughter demonstrated understanding.    Physician Certification: I certify the above information and transfer of Itzel Deng  is necessary for the continuing treatment of the diagnosis listed and that she requires Home Care for less 30 days.     Update Admission H&P: No change in H&P    PHYSICIAN SIGNATURE:  Electronically signed by TANA MOON MD on 6/7/25 at 1:49 PM EDT

## 2025-06-06 NOTE — PROGRESS NOTES
Hospital Medicine Progress Note      Subjective:  RN reports that patient has been irritable in an odd way today.  She seems a little delirious to me.  She keeps having lots of tangential ideas, has a hard time following what's going on, getting overwhelmed, feels like she is losing control of the situation.  We again had her daughter on the phone today during the discussion.  Patient kept yelling at the polite and helpful daughter, eventually hung up on her.  Daughter is worried that patient will be too weak to come home.  Patient seems to be leaning toward refusing SNF, even if we recommend this.  I think she probably is NOT confused enough to allow us to force her to go somewhere against her will.        General appearance: No apparent distress, appears stated age.  HEENT: Pupils equal, round.  Conjunctivae/corneas clear.  Neck: No jugular venous distention.   Respiratory:  Normal respiratory effort.  Bilaterally without Rales/Wheezes/Rhonchi.  Cardiovascular: Normal rate and regular rhythm with normal S1/S2 without murmurs, rubs or gallops.  Abdomen: Soft, non-tender, non-distended with normal bowel sounds.  Musculoskeletal: No cyanosis bilaterally.  LLE edema much improved.  Without deformity.  Skin: No jaundice.  No rashes or lesions.  Neurologic:  Neurovascularly intact without any focal sensory/motor deficits. Cranial nerves: II-XII intact, grossly non-focal.  Psychiatric: Alert and oriented, limited insight, poor concentration, frequent tangential, disorganized ideas and unrelated comments, poor historian, angry.      Presenting Admission History:  The patient is a pleasant 74 Y F with a h/o former smoking, \"white coat HTN,\" HLD, and nephrolithiases.  To me she also seems like she has a degree of chronic cognitive impairment which usually goes unrecognized by family members.  Her daughter admits that she does usually have \"a little bit of forgetfulness.\"  The patient still is able to live alone, drives,  significant risk requiring lab monitoring (furosemide/Cr for FUNMILAYO, vanc/Cr for FUNMILAYO, insulin/glucose for hypoglycemia, contrast/Cr for FUNMILAYO, heparin gtt/anti-Xa for risk of bleeding)  [] imaging or rhythm strip interpretation  [x] 3 of these: vascular signed off consult reviewed, Cr stable labs reviewed, collateral history from daughter that patient probably isn't safe at home in her opinion  [] IV controlled substance (drug) ordered PRN because of severe pain  [] major procedure or surgery with significant risk  [] change in code status or decision to escalate care  [] spent 51 minutes working on this patient today           Diet: ADULT DIET; Regular    Medications:        Infusion Medications    heparin (PORCINE) Infusion 16 Units/kg/hr (06/06/25 0739)    sodium chloride       Scheduled Medications    sodium chloride flush  5-40 mL IntraVENous 2 times per day     PRN Meds: heparin (porcine), heparin (porcine), sodium chloride flush, sodium chloride, potassium chloride **OR** potassium alternative oral replacement **OR** potassium chloride, magnesium sulfate, polyethylene glycol, acetaminophen **OR** acetaminophen, prochlorperazine, guaiFENesin-dextromethorphan    /69   Pulse 84   Temp 97.5 °F (36.4 °C) (Oral)   Resp 20   Ht 1.651 m (5' 5\")   Wt 70.6 kg (155 lb 10.3 oz)   SpO2 97%   BMI 25.90 kg/m²     Telemetry:      Personally reviewed and interpreted telemetry (Rhythm Strip) on 6/6/2025 with the following findings:     Diet: ADULT DIET; Regular    DVT Prophylaxis: []PPx LMWH  []SQ Heparin  []IPC/SCDs  []Eliquis  []Xarelto  []Coumadin  [] Heparin Drip  []Other -      Code status: Full Code    PT/OT Eval Status:   []NOT yet ordered  []Ordered and Pending   []Seen with Recommendations for:   []Home independently  []Home w/ assist  []HHC  []SNF  []Acute Rehab    Multi-Disciplinary Rounds with Case Management completed on 6/6/2025 with the following recommendations:  Anticipated Discharge Location: []Home

## 2025-06-06 NOTE — PLAN OF CARE
Problem: Pain  Goal: Verbalizes/displays adequate comfort level or baseline comfort level  Outcome: Progressing  Note: Pt with complaints of left knee/leg/ankle/hip pain this shift, medicated with prn tylenol, ice pack applied with effectiveness.       Problem: Discharge Planning  Goal: Discharge to home or other facility with appropriate resources  Outcome: Progressing  Note: OT worked with pt.  Pt hoping to discharge home tomorrow, Saturday, June 7th.

## 2025-06-06 NOTE — CARE COORDINATION
Cm update; LOS # 2;POD # 1  Followed by IM, Vascular Surgery, Cardiology : S/P LLE venous thrombectomy. Continues on Heparin will transition to oral anticoagulation. IPTA; Will follow for discharge needs.Mery Muniz RN     English

## 2025-06-07 VITALS
WEIGHT: 155.65 LBS | DIASTOLIC BLOOD PRESSURE: 75 MMHG | OXYGEN SATURATION: 97 % | HEART RATE: 74 BPM | RESPIRATION RATE: 16 BRPM | TEMPERATURE: 98 F | BODY MASS INDEX: 25.93 KG/M2 | HEIGHT: 65 IN | SYSTOLIC BLOOD PRESSURE: 126 MMHG

## 2025-06-07 PROCEDURE — 6370000000 HC RX 637 (ALT 250 FOR IP): Performed by: INTERNAL MEDICINE

## 2025-06-07 PROCEDURE — 97530 THERAPEUTIC ACTIVITIES: CPT

## 2025-06-07 PROCEDURE — 97116 GAIT TRAINING THERAPY: CPT

## 2025-06-07 PROCEDURE — 2500000003 HC RX 250 WO HCPCS: Performed by: INTERNAL MEDICINE

## 2025-06-07 PROCEDURE — 97161 PT EVAL LOW COMPLEX 20 MIN: CPT

## 2025-06-07 RX ADMIN — ACETAMINOPHEN 650 MG: 325 TABLET ORAL at 11:52

## 2025-06-07 RX ADMIN — APIXABAN 10 MG: 5 TABLET, FILM COATED ORAL at 07:55

## 2025-06-07 RX ADMIN — SODIUM CHLORIDE, PRESERVATIVE FREE 10 ML: 5 INJECTION INTRAVENOUS at 07:55

## 2025-06-07 ASSESSMENT — PAIN DESCRIPTION - LOCATION: LOCATION: LEG

## 2025-06-07 ASSESSMENT — PAIN SCALES - GENERAL
PAINLEVEL_OUTOF10: 0
PAINLEVEL_OUTOF10: 3
PAINLEVEL_OUTOF10: 0
PAINLEVEL_OUTOF10: 3

## 2025-06-07 ASSESSMENT — PAIN DESCRIPTION - ORIENTATION: ORIENTATION: LEFT

## 2025-06-07 ASSESSMENT — PAIN DESCRIPTION - DESCRIPTORS: DESCRIPTORS: ACHING

## 2025-06-07 NOTE — PLAN OF CARE
Problem: Discharge Planning  Goal: Discharge to home or other facility with appropriate resources  6/7/2025 0910 by Greta Mcallister, RN  Outcome: Progressing  Flowsheets (Taken 6/7/2025 0740)  Discharge to home or other facility with appropriate resources:   Identify barriers to discharge with patient and caregiver   Arrange for needed discharge resources and transportation as appropriate  6/6/2025 1919 by Nancy Hernández, RN  Outcome: Progressing  Note: OT worked with pt.  Pt hoping to discharge home tomorrow, Saturday, June 7th.       Problem: Pain  Goal: Verbalizes/displays adequate comfort level or baseline comfort level  6/7/2025 0910 by Greta Mcallister, RN  Outcome: Progressing  6/6/2025 1919 by Nancy Hernández, RN  Outcome: Progressing  Note: Pt with complaints of left knee/leg/ankle/hip pain this shift, medicated with prn tylenol, ice pack applied with effectiveness.       Problem: Safety - Adult  Goal: Free from fall injury  Outcome: Progressing     Problem: ABCDS Injury Assessment  Goal: Absence of physical injury  Outcome: Progressing

## 2025-06-07 NOTE — PROGRESS NOTES
Physical Therapy  Facility/Department: [facility]  Physical Therapy Initial Assessment & Discharge Summary    Name: Itzel Deng  : 1950  MRN: [id number]  Date of Service: 2025    Discharge Recommendations:  Home with assist PRN, Home with Home health PT   PT Equipment Recommendations  Equipment Needed: No      Patient Diagnosis(es): The primary encounter diagnosis was Acute deep vein thrombosis (DVT) of proximal vein of left lower extremity (HCC). A diagnosis of DVT (deep venous thrombosis) (HCC) was also pertinent to this visit.  Past Medical History:  has a past medical history of Acute hemorrhoid, Deep vein thrombosis (HCC), Depression, Elevated blood pressure reading in office with white coat syndrome, without diagnosis of hypertension, and Osteoarthritis.  Past Surgical History:  has a past surgical history that includes Tubal ligation; Breast enhancement surgery (); CT PERITONEAL/RETROPERITONEAL PERC DRAIN (2021); Small intestine surgery (N/A, 2021); and invasive vascular (N/A, 2025).    Assessment  Assessment: Pt is awake and agreeable to therapy session. She is admitted to [facility] w/ LLE DVT, now s/p thrombectomy. She does request home PT for further progression of exercises. She reports feeling back to baseline level of mobility and denies concerns for return home. Due to no acute needs, she is discharged from acute PT at this time.  Therapy Prognosis: Excellent  Decision Making: Low Complexity  Requires PT Follow-Up: No  Activity Tolerance  Activity Tolerance: Patient tolerated evaluation without incident          Plan  Physical Therapy Plan  General Plan: Discharge with evaluation only  Safety Devices  Type of Devices: Bed alarm in place, Left in bed, Call light within reach, All ghada prominences offloaded, Heels elevated for pressure relief, Nurse notified, Patient at risk for falls, Gait belt  Restraints  Restraints Initially in Place:  Mobility Training: Yes  Sit to Supine: Supervision  Balance  Sitting: Intact  Standing: High guard  Transfer Training  Transfer Training: Yes  Sit to Stand: Supervision  Stand to Sit: Supervision  Gait  Gait Training: Yes  Distance (ft): 70 Feet  Assistive Device: Gait belt;Cane, straight (x2 canes)  Interventions: Safety awareness training  Base of Support: Widened  Speed/Margarita: Shuffled;Slow  Step Length: Right shortened;Left shortened  Gait Abnormalities: Decreased step clearance                         OutComes Score                                                  AM-PAC - Mobility    AM-PAC Basic Mobility - Inpatient   How much help is needed turning from your back to your side while in a flat bed without using bedrails?: None  How much help is needed moving from lying on your back to sitting on the side of a flat bed without using bedrails?: None  How much help is needed moving to and from a bed to a chair?: None  How much help is needed standing up from a chair using your arms?: None  How much help is needed walking in hospital room?: None  How much help is needed climbing 3-5 steps with a railing?: A Little  [facility] : 23  AM-PAC Inpatient T-Scale Score : 56.93  Mobility Inpatient CMS 0-100% Score: 11.2  Mobility Inpatient CMS G-Code Modifier : CI         Tinneti Score       Goals   No acute goals identified        Education  Patient Education  Education Given To: Patient  Education Provided: Role of Therapy;Plan of Care;Transfer Training  Education Provided Comments: Disease Specific Education: Pt educated on importance of OOB mobility, prevention of complications of bedrest, and general safety during hospitalization.  Education Method: Verbal  Barriers to Learning: Readiness to Learn  Education Outcome: Verbalized understanding      Therapy Time   Individual Concurrent Group Co-treatment   Time In 0842         Time Out 0916         Minutes 34         Timed Code Treatment

## 2025-06-07 NOTE — DISCHARGE SUMMARY
Retroperitoneum: Large left renal cyst measuring 8.7 cm in diameter with simple fluid attenuation. No follow-up required for benign renal cysts. No retroperitoneal lymphadenopathy identified. Bowel and peritoneum: Nonobstructive bowel gas pattern. No evidence of free air. Normal appendix in the lower abdomen. No free fluid. Pelvis: Normal opacification of the inferior vena cava. There is a filling defect in the left femoral vein, external iliac vein to the level of the iliac confluence. Complex left ovarian cyst with thin septations measuring 4.7 x 3.9 cm. No pelvic lymphadenopathy identified. Bones: No suspicious osseous abnormality. Severe osteoarthritis of the hips.     1. Deep venous thrombosis involving the left common femoral vein extending superiorly to the left common iliac vein. No thrombosis identified in the common iliac veins or in the inferior vena cava. 2. Hepatic steatosis. 3. Hiatal hernia. 4. Complex left ovarian cyst with thin septations. Benign or malignant cystic ovarian neoplasm is not excluded. Consider follow-up pelvic ultrasound and/or surgical consultation. The cyst has increased in size from 2021 at which point it measured 2.7 x 3.0 cm. Electronically signed by Tarun Cavazos MD    Vascular duplex lower extremity venous left  Result Date: 6/4/2025    Extensive acute deep vein thrombosis in the left external iliac vein, common femoral vein, saphenofemoral junction, femoral vein, popliteal vein, and soleal vein.   Foca acute superficial vein thrombosis in the thigh region of the left great saphenous vein.     CT CHEST PULMONARY EMBOLISM W CONTRAST  Result Date: 6/4/2025  CT PULMONARY ANGIOGRAPHY OF THE CHEST History: Chest Pain. Shortness of Breath. Comparison: None PROCEDURE: 100 ml of Omnipaque 350 contrast material injected as a bolus with subsequent thin 1.25 mm sections with MIP rendered reconstructions, reviewed in 3 dimensions on a separate computerized workstation.  Individualized    Lab Results   Component Value Date/Time    BC No Growth after 4 days of incubation. 02/15/2021 08:22 PM     Lab Results   Component Value Date/Time    BLOODCULT2 No Growth after 4 days of incubation. 02/15/2021 08:38 PM     Organism:   Lab Results   Component Value Date/Time    ORG Beta Strep Group F 02/02/2021 12:30 PM         The patient expressed appropriate understanding of, and agreement with the discharge recommendations, medications, and plan.     Full Code    Discharge condition: stable    Consults this admission:  IP CONSULT TO VASCULAR SURGERY  IP CONSULT TO HOSPITALIST  IP CONSULT TO CASE MANAGEMENT    Time Spent Discharging patient 33 minutes    Electronically signed by TANA MOON MD on 6/7/2025 at 1:49 PM

## 2025-06-07 NOTE — CARE COORDINATION
CASE MANAGEMENT DISCHARGE SUMMARY      Discharge to: Home with [facility]     IMM given: (date) 6/6    Transportation:    Family/car: yes    Confirmed discharge plan with:     Patient: yes      Family:  no per pt     RN, name: Greta MORRISON    Pt has no preference in [facility] agency. [facility] pulling clinicals    Note: Discharging nurse to complete CARO, reconcile AVS, and place final copy with patient's discharge packet. RN to ensure that written prescriptions for  Level II medications are sent with patient to the facility as per protocol.

## 2025-06-10 ENCOUNTER — TELEPHONE (OUTPATIENT)
Dept: PHARMACY | Age: 75
End: 2025-06-10

## 2025-06-10 NOTE — TELEPHONE ENCOUNTER
Referral received for warfarin monitoring/adjustment for this patient    Contacted patient who reports she spoke with her primary care provider and has decided to take apixaban instead of warfarin.     Will close referral at this time. If patient requires transitioning to warfarin in the future please re-refer patient to the [facility] anticoagulation clinic.     Getachew Sadler, PharmD 6/10/2025 11:37 AM

## 2025-06-12 ENCOUNTER — OFFICE VISIT (OUTPATIENT)
Dept: VASCULAR SURGERY | Age: 75
End: 2025-06-12

## 2025-06-12 VITALS
WEIGHT: 155 LBS | HEIGHT: 65 IN | BODY MASS INDEX: 25.83 KG/M2 | DIASTOLIC BLOOD PRESSURE: 72 MMHG | SYSTOLIC BLOOD PRESSURE: 152 MMHG

## 2025-06-12 DIAGNOSIS — I82.402 DEEP VEIN THROMBOSIS (DVT) OF LEFT LOWER EXTREMITY, UNSPECIFIED CHRONICITY, UNSPECIFIED VEIN (HCC): Primary | ICD-10-CM

## 2025-06-12 PROCEDURE — 99024 POSTOP FOLLOW-UP VISIT: CPT | Performed by: SURGERY

## 2025-06-17 ENCOUNTER — TELEPHONE (OUTPATIENT)
Dept: CASE MANAGEMENT | Age: 75
End: 2025-06-17

## 2025-06-17 NOTE — TELEPHONE ENCOUNTER
Imaging report CTA Abd 6/4/25 with f/u imaging recommendations sent to Qi RODRÍGUEZ(R)  Patient Navigator  Incidentals/Lung Navigation  Jaki@"[employer]

## (undated) DEVICE — RESERVOIR,SUCTION,100CC,SILICONE: Brand: MEDLINE

## (undated) DEVICE — SUTURE PERMA-HAND SZ 2-0 L30IN NONABSORBABLE BLK L26MM SH K833H

## (undated) DEVICE — SUTURE VCRL + SZ 3-0 L18IN ABSRB UD SH 1/2 CIR TAPERCUT NDL VCP864D

## (undated) DEVICE — GOWN,REINF,POLY,AURORA,XLNG/XXL,STRL: Brand: MEDLINE

## (undated) DEVICE — SIGMOIDOSCOPE MED L25CM DIA20MM W/ OBT DISP KLEENSPEC

## (undated) DEVICE — RADIFOCUS GLIDECATH: Brand: GLIDECATH

## (undated) DEVICE — GUIDEWIRE VASC L260CM DIA0.035IN L15CM STR TIP PTFE S STL

## (undated) DEVICE — NAMIC VASCULAR KIT: Brand: MEDLINE INDUSTRIES, INC.

## (undated) DEVICE — JELLY,LUBE,STERILE,FLIP TOP,TUBE,2-OZ: Brand: MEDLINE

## (undated) DEVICE — SUTURE PERMAHAND SZ 2-0 L18IN NONABSORBABLE BLK L26MM SH C012D

## (undated) DEVICE — GAUZE,SPONGE,2"X2",8PLY,STERILE,LF,2'S: Brand: MEDLINE

## (undated) DEVICE — FEE PROCEDURE FLOWTRIEVER THROMBECTOMY SYSTEM

## (undated) DEVICE — DRAIN CHN 19FR L0.25IN DIA6.3MM SIL RND HUBLESS FULL FLUT

## (undated) DEVICE — DRESSING FOAM W6.3XL7.9IN TAN SACR SELF ADH MEPILEX BORD

## (undated) DEVICE — TOTAL TRAY, DB, 100% SILI FOLEY, 16FR 10: Brand: MEDLINE

## (undated) DEVICE — GUIDEWIRE VASC L260CM DIA0.035IN TIP L7CM PTFE S STL STR

## (undated) DEVICE — Device

## (undated) DEVICE — 3M™ TEGADERM™ TRANSPARENT FILM DRESSING FRAME STYLE WITH BORDER, 1616, 4 IN X 4-3/4 IN (10 CM X 12 CM), 50/CT 4CT/CASE: Brand: 3M™ TEGADERM™

## (undated) DEVICE — DRESSING FOAM SELF ADH 20X10 CM ABSORBENT MEPILEX BORDER

## (undated) DEVICE — Z DISCONTINUED USE 2716239 STAPLER INT STPL 51MM CUT LN L40MM STD TISS CRV CUT CR40B

## (undated) DEVICE — RADIFOCUS GLIDEWIRE: Brand: GLIDEWIRE

## (undated) DEVICE — 3M™ TEGADERM™ TRANSPARENT FILM DRESSING FRAME STYLE, 1624W, 2-3/8 IN X 2-3/4 IN (6 CM X 7 CM), 100/CT 4CT/CASE: Brand: 3M™ TEGADERM™

## (undated) DEVICE — SUTURE PERMAHAND SZ 3-0 L18IN NONABSORBABLE BLK L26MM SH C013D

## (undated) DEVICE — STAPLER INT DIA29MM CLS STPL H1.5-2.2MM OPN LEG L5.2MM 26

## (undated) DEVICE — SOLUTION IV IRRIG POUR BRL 0.9% SODIUM CHL 2F7124

## (undated) DEVICE — GLOVE,SURG,SENSICARE SLT,LF,PF,7.5: Brand: MEDLINE

## (undated) DEVICE — SEALER ENDOSCP NANO COAT OPN DIV CRV L JAW LIGASURE IMPACT

## (undated) DEVICE — DILATOR COONS TAPER: Brand: COONS

## (undated) DEVICE — CLOTTRIEVER, 16MM: Brand: CLOTTRIEVER CATHETER

## (undated) DEVICE — SUTURE MCRYL + SZ 4-0 L18IN ABSRB UD L19MM PS-2 3/8 CIR MCP496G

## (undated) DEVICE — EP VASCULAR PACK: Brand: MEDLINE INDUSTRIES, INC.

## (undated) DEVICE — CLOTTRIEVER SHEATH, 16 FR, 15 CM LENGTH: Brand: CLOTTRIEVER SHEATH,  16 FR

## (undated) DEVICE — SUTURE PERMAHAND SZ 2-0 L30IN NONABSORBABLE BLK SH L26MM C016D

## (undated) DEVICE — SUTURE PDS II SZ 0 L60IN ABSRB VLT L48MM CTX 1/2 CIR Z990G

## (undated) DEVICE — PINNACLE PRECISION ACCESS SYSTEM INTRODUCER SHEATH: Brand: PINNACLE PRECISION ACCESS SYSTEM

## (undated) DEVICE — STAPLER EXT 65MM S STL AUTO DISP PURSTRING

## (undated) DEVICE — SPONGE LAP W18XL18IN WHT COT 4 PLY FLD STRUNG RADPQ DISP ST